# Patient Record
Sex: FEMALE | Race: WHITE | NOT HISPANIC OR LATINO | Employment: UNEMPLOYED | URBAN - METROPOLITAN AREA
[De-identification: names, ages, dates, MRNs, and addresses within clinical notes are randomized per-mention and may not be internally consistent; named-entity substitution may affect disease eponyms.]

---

## 2017-01-23 DIAGNOSIS — K21.9 GASTROESOPHAGEAL REFLUX DISEASE, ESOPHAGITIS PRESENCE NOT SPECIFIED: Primary | ICD-10-CM

## 2017-01-27 RX ORDER — LANSOPRAZOLE 30 MG/1
30 CAPSULE, DELAYED RELEASE ORAL DAILY
Qty: 30 CAPSULE | Refills: 3 | Status: SHIPPED | OUTPATIENT
Start: 2017-01-27 | End: 2017-08-29

## 2017-02-21 DIAGNOSIS — D68.62 LUPUS ANTICOAGULANT SYNDROME (H): ICD-10-CM

## 2017-02-21 RX ORDER — WARFARIN SODIUM 5 MG/1
5 TABLET ORAL DAILY
Qty: 60 TABLET | Refills: 0 | Status: SHIPPED | OUTPATIENT
Start: 2017-02-21 | End: 2017-06-06

## 2017-03-28 ENCOUNTER — OFFICE VISIT (OUTPATIENT)
Dept: FAMILY MEDICINE | Facility: CLINIC | Age: 32
End: 2017-03-28

## 2017-03-28 VITALS
WEIGHT: 142.4 LBS | HEIGHT: 62 IN | BODY MASS INDEX: 26.2 KG/M2 | HEART RATE: 98 BPM | OXYGEN SATURATION: 98 % | TEMPERATURE: 98.7 F | DIASTOLIC BLOOD PRESSURE: 87 MMHG | SYSTOLIC BLOOD PRESSURE: 126 MMHG

## 2017-03-28 DIAGNOSIS — D68.62 LUPUS ANTICOAGULANT DISORDER (H): Primary | ICD-10-CM

## 2017-03-28 DIAGNOSIS — F41.1 GAD (GENERALIZED ANXIETY DISORDER): ICD-10-CM

## 2017-03-28 LAB — INR PPP: 1.5

## 2017-03-28 RX ORDER — CLONAZEPAM 0.5 MG/1
0.5 TABLET ORAL 2 TIMES DAILY
Qty: 60 TABLET | Refills: 0 | Status: SHIPPED | OUTPATIENT
Start: 2017-03-28 | End: 2017-08-15

## 2017-03-28 ASSESSMENT — ANXIETY QUESTIONNAIRES
7. FEELING AFRAID AS IF SOMETHING AWFUL MIGHT HAPPEN: NOT AT ALL
5. BEING SO RESTLESS THAT IT IS HARD TO SIT STILL: NEARLY EVERY DAY
3. WORRYING TOO MUCH ABOUT DIFFERENT THINGS: NEARLY EVERY DAY
GAD7 TOTAL SCORE: 18
1. FEELING NERVOUS, ANXIOUS, OR ON EDGE: NEARLY EVERY DAY
6. BECOMING EASILY ANNOYED OR IRRITABLE: NEARLY EVERY DAY
IF YOU CHECKED OFF ANY PROBLEMS ON THIS QUESTIONNAIRE, HOW DIFFICULT HAVE THESE PROBLEMS MADE IT FOR YOU TO DO YOUR WORK, TAKE CARE OF THINGS AT HOME, OR GET ALONG WITH OTHER PEOPLE: SOMEWHAT DIFFICULT
2. NOT BEING ABLE TO STOP OR CONTROL WORRYING: NEARLY EVERY DAY

## 2017-03-28 ASSESSMENT — PATIENT HEALTH QUESTIONNAIRE - PHQ9: 5. POOR APPETITE OR OVEREATING: NEARLY EVERY DAY

## 2017-03-28 NOTE — PROGRESS NOTES
"  SUBJECTIVE:  Patient is a 31-year-old female with her sporadic follow up at the clinic presenting for issues with anxiety, requesting a psychiatry referral today.  Patient is a mother of a soon to be 2-year-old son.  She reports that she is at her wits end and reports that her partner whom she lives with and is the father of her sonGuille is not helpful, calls her names, drinks on a nightly basis, smokes marijuana.  She reports that he is not supportive as a co-parent and that he believes that it is her job to raise the children as he works every day and brings home financial support for the family.  The couple do live together.  However, she is the sole caregiver of their son, Guille per her report.  She reports that she has asked for help with son Guille in the past as recently as this week and she is told that this is her role and responsibility.  Patient reports that she has not slept more than 2 or 3 hours for the for the last 2 years.  She reports that her son, Guille continues to get up in the night on multiple occasions frequently transfers from  his room to her bed makes it very difficult to sleep.  She reports that she is extremely fatigued secondary to this.  She also reports that if given the chance to sleep she does not believe she would be able to.  She reports that she cannot \"turn off her brain\" and reports that she takes 2-3 hours to wind down and then when she is able to wind down her son wakes up.  She is requesting something to help with sleep.  She has tried the following in the past, hydroxyzine which was ineffective, Unisom which was ineffective and gabapentin, which made her feel \"crazy\" and that she would not live through the night.   She has also tried melatonin without help.  She is currently on Paxil daily for generalized depression as she does not feel that this helps with her sleep.  She denies illicit drug use other than occasional marijuana.  Denies smoking, denies alcohol dependence. " " The patient is requesting something to help her with her anxiety and sleep.     Patient also with a known history of lupus anticoagulant disorder.  She is on warfarin 5 mg a day.  Reports that she has not been taking it lately and over the past 2 days has reinitiated.  She is due for an INR today.       Patient Active Problem List   Diagnosis     Anxiety attack     Lupus anticoagulant syndrome (H)     Intractable chronic migraine without aura     Gastroesophageal reflux disease     Anxiety     Back pain     History of anticoagulant therapy     Chronic pain     Dysfunctional uterine bleeding     Generalized anxiety disorder     History of surgical procedure     History of obstetric problem     Lupus anticoagulant disorder (H)     Migraine     Adult body mass index greater than 30     History of thrombosis     Restless legs syndrome     Calculus of ureter     History of tubal ligation     Screening for malignant neoplasm of cervix     Cervical intraepithelial neoplasia grade III with severe dysplasia     Current smoker     Tobacco dependence syndrome     Pap smear for cervical cancer screening     Insomnia     Calculus of kidney     Current Outpatient Prescriptions   Medication     clonazePAM (KLONOPIN) 0.5 MG tablet     warfarin (COUMADIN) 5 MG tablet     PARoxetine (PAXIL) 40 MG tablet     LANsoprazole (PREVACID) 30 MG CR capsule     melatonin 3 MG tablet     ALPRAZolam (XANAX) 0.5 MG tablet     ondansetron (ZOFRAN) 4 MG tablet     oxyCODONE-acetaminophen (PERCOCET) 5-325 MG per tablet     No current facility-administered medications for this visit.        EXAM:   /87 (BP Location: Right arm, Patient Position: Chair, Cuff Size: Adult Regular)  Pulse 98  Temp 98.7  F (37.1  C) (Oral)  Ht 5' 1.75\" (156.8 cm)  Wt 142 lb 6.4 oz (64.6 kg)  LMP 03/21/2017  SpO2 98%  BMI 26.26 kg/m2    This is an anxious appearing 31-year-old female with son present.  Son is very active in the room.  Patient is coherent.  She " is able to follow logical thought.  She is able to communicate effectively.  She is able to give me a chronologic history, she does not appear to be under the influence of any substances.  She does seem to be in moderate distress from stress.   HEENT:  Head is atraumatic, normocephalic.   CARDIOVASCULAR:  Regular rate and rhythm without murmurs, rubs or gallops.   LUNGS:  Clear to auscultation bilaterally without wheezes, rhonchi or rales.   SKIN:  Warm and dry without bruises, rashes or other lesions.   PSYCHIATRIC:  As above.     MSK:  Ambulating without difficulty.   Interaction with her son is normal.     ASSESSMENT AND PLAN:  This is a 31-year-old female with a history of generalized anxiety disorder,  social disruption and depression.  She also has a known history of lupus anticoagulant disorder on warfarin but has had intermittent follow up with INRs.  Problems are as follows;   1.  Sleep interruption.  I discussed extensively with the patient the importance of sleep and the importance of getting both her and her son to sleep.  I discussed that no medication overall is going to help and that I do not feel that a stronger sleep medicine such as a Z drug or other would be beneficial as she still needs to be able to respond to son and that the true  i with son's sleep hygiene and I gave two handouts on sleep training for her and encouraged her to talk to her partner about sleep training their son Guille, discussed with patient that we need Guille to be sleeping through the night, which he should be at 2 years of age in order for her to get adequate sleep and to treat underlying anxiety and exhaustion.  I did discuss with patient some resources for respite and encouraged her to think of family or friends who can give her some respite support.  She is not currently open to these suggestions today.   2.  Anxiety.  Patient is requesting a psychiatry referral.  I agreed with this; however, the patient does have some  social disruption as well as sleep issues which are compounding issues.  I discussed with the patient today and I have agreed to a low dose of scheduled Klonopin to help with daytime anxiety and we will not use a sleep agent today.  The patient should continue her Paxil and a psychiatry referral has been placed.  The patient is amenable to this.  The patient does report occasional THC use and I reported to her that for a refill of Klonopin she would need to have a clean urine drug screen prior to refill.  She verbalizes understanding of this.   3.  Lupus anticoagulant disorders.  INR is sub-therapeutic today.  Patient has not been taking medications regularly.  She will continue with 5 mg daily of warfarin and have this rechecked in 1-2 weeks when she follows up to discuss sleep training as well as anxiety.   4.  Social disruption.  It sounds as though patient does not have a significant amount of social support and we will need to continue to talk about her level of comfort with her current living situation and if she feels safe in her current environment.  She does not express any concerns about harm by her partner today.  She did say that he occasionally calls her names, but she does not feel threatened.  Patient will follow up with me in 1-2 weeks.     Greater than 25 min spent in direct consultation, evaluation and education with patient    Options for treatment and follow-up care were reviewed with the patient and/or guardian. Lisandra Arauz and/or guardian engaged in the decision making process and verbalized understanding of the options discussed and agreed with the final plan.    Flaquita Starr MD

## 2017-03-28 NOTE — MR AVS SNAPSHOT
After Visit Summary   3/28/2017    Lisandra Arauz    MRN: [id number]           Patient Information     Date Of Birth          1985        Visit Information        Provider Department      3/28/2017 2:00 PM Flaquita Starr MD [facility]        Today's Diagnoses     Lupus anticoagulant disorder (H)    -  1    ARGENTINA (generalized anxiety disorder)           Follow-ups after your visit        Additional Services     Mental Health Referral - [facility]       Use this form for behavioral health consults and assessments. The referral coordinator will help to determine whether patients are best served by clinic behavioral health staff or by community providers.    Type of referral(s) requested (indicate all that apply):  Adult Psychiatry--for diagnosis and medication management    Reason for referral:  Depression with anxiety.  insomnia    Currently receiving mental health services (if 'Yes', what services and why today's referral?): No  Currently having suicidal thoughts: No  Previous psych hospitalization: Unknown       needed: No  Language: English                  Your next 10 appointments already scheduled     Apr 11, 2017  2:40 PM CDT   Return Visit with Flaquita Starr MD   [facility] ([facility])    [address] [place] [address]   [phone]              Who to contact     Please call your clinic at [phone] to:    Ask questions about your health    Make or cancel appointments    Discuss your medicines    Learn about your test results    Speak to your doctor   If you have compliments or concerns about an experience at your clinic, or if you wish to file a complaint, please contact [facility] Patient Relations at [phone] or email us at [email or web address]         Additional Information About Your Visit        Care EveryWhere ID     This is your Care EveryWhere ID. This could be  "used by other organizations to access your [place] medical records  [id number]        Your Vitals Were     Pulse Temperature Height Last Period Pulse Oximetry BMI (Body Mass Index)    98 98.7  F (37.1  C) (Oral) 5' 1.75\" (156.8 cm) 03/21/2017 98% 26.26 kg/m2       Blood Pressure from Last 3 Encounters:   03/28/17 126/87   12/13/16 133/87   11/04/16 136/89    Weight from Last 3 Encounters:   03/28/17 142 lb 6.4 oz (64.6 kg)   12/13/16 135 lb 9.6 oz (61.5 kg)   11/04/16 142 lb 3.2 oz (64.5 kg)              We Performed the Following     INR ([facility])     Mental Health Referral - [facility]          Today's Medication Changes          These changes are accurate as of: 3/28/17 11:59 PM.  If you have any questions, ask your nurse or doctor.               Start taking these medicines.        Dose/Directions    clonazePAM 0.5 MG tablet   Commonly known as:  klonoPIN   Used for:  ARGENTINA (generalized anxiety disorder)   Started by:  Flaquita Starr MD        Dose:  0.5 mg   Take 1 tablet (0.5 mg) by mouth 2 times daily   Quantity:  60 tablet   Refills:  0            Where to get your medicines      Some of these will need a paper prescription and others can be bought over the counter.  Ask your nurse if you have questions.     Bring a paper prescription for each of these medications     clonazePAM 0.5 MG tablet                Primary Care Provider Office Phone # Fax #    Flaquita Starr [phone]       [facility] [address] [place] [address]        Thank you!     Thank you for choosing [facility]  for your care. Our goal is always to provide you with excellent care. Hearing back from our patients is one way we can continue to improve our services. Please take a few minutes to complete the written survey that you may receive in the mail after your visit with us. Thank you!             Your Updated Medication List - Protect others around you: Learn how to " safely use, store and throw away your medicines at www.disposemymeds.org.          This list is accurate as of: 3/28/17 11:59 PM.  Always use your most recent med list.                   Brand Name Dispense Instructions for use    ALPRAZolam 0.5 MG tablet    XANAX    7 tablet    Take 1 tablet (0.5 mg) by mouth nightly as needed for sleep or anxiety       clonazePAM 0.5 MG tablet    klonoPIN    60 tablet    Take 1 tablet (0.5 mg) by mouth 2 times daily       LANsoprazole 30 MG CR capsule    PREVACID    30 capsule    Take 1 capsule (30 mg) by mouth daily Take 30-60 minutes before a meal.       melatonin 3 MG tablet     30 tablet    Take 1 tablet (3 mg) by mouth nightly as needed for sleep       PARoxetine 40 MG tablet    PAXIL    30 tablet    Take 1 tablet (40 mg) by mouth At Bedtime       PERCOCET 5-325 MG per tablet   Generic drug:  oxyCODONE-acetaminophen      Take 1-2 tablets by mouth Reported on 3/28/2017       warfarin 5 MG tablet    COUMADIN    60 tablet    Take 1 tablet (5 mg) by mouth daily       ZOFRAN 4 MG tablet   Generic drug:  ondansetron      Reported on 3/28/2017

## 2017-03-31 ENCOUNTER — DOCUMENTATION ONLY (OUTPATIENT)
Dept: FAMILY MEDICINE | Facility: CLINIC | Age: 32
End: 2017-03-31

## 2017-03-31 NOTE — PROGRESS NOTES
Referral for (Test): Psychiatry/Psychology   Location/Place/Provider:    Date/Time:    Phone:   Fax:   Additional information/prep.: I called to help the pt set up this appointment, I called the pt on 03/31/17, 04/03/17, and today, the pt phone number was not taking any incoming calls right now.  I will print out the referral, attach my contact information, and mail it to the pt.    Scheduled by: NERI Merino

## 2017-03-31 NOTE — PROGRESS NOTES
Procedure Requested        9035     Mental Health Referral - [facility] [#[id number]]         Priority: Routine  Class: External referral         Comment:Use this form for behavioral health consults and assessments. The                  referral coordinator will help to determine whether patients are                  best served by clinic behavioral health staff or by community                  providers.                                    Type of referral(s) requested (indicate all that apply):                  Adult Psychiatry--for diagnosis and medication management                                    Reason for referral:  Depression with anxiety.  insomnia                                    Currently receiving mental health services (if 'Yes', what                   services and why today's referral?): No                  Currently having suicidal thoughts: No                  Previous psych hospitalization: Unknown                                                       needed: No                  Language: English       Associated Diagnoses         F41.1 ARGENTINA (generalized anxiety disorder)               KEISHA LOVE             [id number]               : 1985  F      [address]                                     PCP: [id number], KATHRYN M* [place] [address]                                CTR: [facility]

## 2017-04-13 ASSESSMENT — ANXIETY QUESTIONNAIRES: GAD7 TOTAL SCORE: 18

## 2017-04-13 ASSESSMENT — PATIENT HEALTH QUESTIONNAIRE - PHQ9: SUM OF ALL RESPONSES TO PHQ QUESTIONS 1-9: 8

## 2017-05-01 ENCOUNTER — TELEPHONE (OUTPATIENT)
Dept: FAMILY MEDICINE | Facility: CLINIC | Age: 32
End: 2017-05-01

## 2017-05-01 NOTE — TELEPHONE ENCOUNTER
Attempted to reach patient to follow up on recent ED visit for flank pain. Number is not accepting calls at this time.

## 2017-06-06 DIAGNOSIS — D68.62 LUPUS ANTICOAGULANT SYNDROME (H): ICD-10-CM

## 2017-06-10 RX ORDER — WARFARIN SODIUM 5 MG/1
5 TABLET ORAL DAILY
Qty: 60 TABLET | Refills: 0 | Status: SHIPPED | OUTPATIENT
Start: 2017-06-10 | End: 2017-08-04

## 2017-06-12 ENCOUNTER — TELEPHONE (OUTPATIENT)
Dept: FAMILY MEDICINE | Facility: CLINIC | Age: 32
End: 2017-06-12

## 2017-06-12 NOTE — TELEPHONE ENCOUNTER
[facility] phone call message- general phone call:    Reason for call: Patient called to schedule an appt with Dr. Starr regarding her INR but she states her insurance is not active at the moment so I told her that I am not able to schedule until her insurance is active. Patient states it will be active in 2 weeks and she is still waiting to hear back from her  so she wants to put in a message for Dr. Starr letting her know that she did try to make an appt but no active insurance.     Return call needed: No    OK to leave a message on voice mail?     Primary language: English      needed? No    Call taken on June 12, 2017 at 9:21 AM by Vj Merchant

## 2017-07-09 ENCOUNTER — TRANSFERRED RECORDS (OUTPATIENT)
Dept: HEALTH INFORMATION MANAGEMENT | Facility: CLINIC | Age: 32
End: 2017-07-09

## 2017-07-13 ENCOUNTER — TELEPHONE (OUTPATIENT)
Dept: FAMILY MEDICINE | Facility: CLINIC | Age: 32
End: 2017-07-13

## 2017-07-13 NOTE — TELEPHONE ENCOUNTER
Recent insurance issues. Please call to see if eligible for appointment scheduling w/ Dr. Starr or lab for INR.

## 2017-07-14 NOTE — TELEPHONE ENCOUNTER
Ed Follow up and insurance questions      Ran insurance and pt is still not active  Called pt and had to leave message to follow up on recent visit to ED at [facility]  Had to leave message also in message asked about her follow up with county regarding insurance asked her to give a call back    Milo

## 2017-07-17 ENCOUNTER — TELEPHONE (OUTPATIENT)
Dept: FAMILY MEDICINE | Facility: CLINIC | Age: 32
End: 2017-07-17

## 2017-07-17 NOTE — TELEPHONE ENCOUNTER
ED follow up    Pt was available when I called and said some of her right sided pain has eased and lessened but she is now uncomfortable due to a yeast infection and cyst on ovary    Asked if she wanted to come in and see a dr but she is still without insurance and unable to cover cost of a visit- told her of aid at [facility] in doing county paperwork and determining eligibility on site or offered our help  She is in process of getting established    She has had a recent move to [facility] as well so address is not correct      Told her of our 24 hr line and encouraged her to seek medical attention if things worsen with her infection or comfort  Lbetz

## 2017-07-26 DIAGNOSIS — F41.1 GENERALIZED ANXIETY DISORDER: ICD-10-CM

## 2017-07-28 RX ORDER — PAROXETINE 40 MG/1
40 TABLET, FILM COATED ORAL AT BEDTIME
Qty: 30 TABLET | Refills: 1 | Status: SHIPPED | OUTPATIENT
Start: 2017-07-28 | End: 2017-10-02 | Stop reason: ALTCHOICE

## 2017-08-08 ENCOUNTER — OFFICE VISIT (OUTPATIENT)
Dept: PSYCHOLOGY | Facility: CLINIC | Age: 32
End: 2017-08-08

## 2017-08-08 DIAGNOSIS — F41.1 GENERALIZED ANXIETY DISORDER: Primary | ICD-10-CM

## 2017-08-08 DIAGNOSIS — F43.10 PTSD (POST-TRAUMATIC STRESS DISORDER): ICD-10-CM

## 2017-08-08 NOTE — MR AVS SNAPSHOT
After Visit Summary   8/8/2017    Lisandra Arauz    MRN: [id number]           Patient Information     Date Of Birth          1985        Visit Information        Provider Department      8/8/2017 2:00 PM Mikayla Peterson, LMFT [facility]        Today's Diagnoses     Generalized anxiety disorder    -  1    PTSD (post-traumatic stress disorder)           Follow-ups after your visit        Additional Services     Mental Health Referral - [facility]       Use this form for behavioral health consults and assessments. The referral coordinator will help to determine whether patients are best served by clinic behavioral health staff or by community providers.    Type of referral(s) requested (indicate all that apply):  Adult Psychiatry--for diagnosis and medication management    Reason for referral: Please schedule with Dr. Carter for psych referral. Pt has hx of anxiety disorder (likely ARGENTINA with some panic, possibly panic d/o) and PTSD. PTSD symptoms have worsened (hypervigilance, flashbacks, not feeling safe/settled) in addition to worsening anxiety. Pt sleeps approx 3-4 hrs/night. Wants improved sleep and decreased feelings of anxiety. Has been on Paxil for a number of years, believes she is maxed out. Wondering about other medication options to help. Reports other sleep aids have been ineffective. Began psychotherapy with me; has done psychotherapy in the past. Well aware of a number of appropriate coping mechanisms; seem to help less now.     Currently receiving mental health services (if 'Yes', what services and why today's referral?): Yes: initiated tx at [facility] with Dr. Peterson  Currently having suicidal thoughts: No  Previous psych hospitalization: No    Please provide data for below screening tools if available.   PHQ-9 Score: 8 (3/23/17)  GAD7 Score: 18 (3/23/17)  PC-PTSD Score: 4 (8/8/17)  Bipolar Screen:  Kathleen (ADHD):   MCHAT (Autism Screen):   Pediatric Symptom Checklist  (PSC):      needed: No  Language: English                  Your next 10 appointments already scheduled     Aug 15, 2017  9:40 AM CDT   Return Visit with Flaquita Starr MD   [facility] ([facility])    [address] [place] [address]   [phone]            Aug 16, 2017 10:40 AM CDT   Return Visit with JANICE Wolfe   [facility] ([facility])    [address] [place] [address]   [phone]              Who to contact     Please call your clinic at [phone] to:    Ask questions about your health    Make or cancel appointments    Discuss your medicines    Learn about your test results    Speak to your doctor   If you have compliments or concerns about an experience at your clinic, or if you wish to file a complaint, please contact [facility] Patient Relations at [phone] or email us at [email or web address]         Additional Information About Your Visit        MyLuvsharArcMail Information     Jumio is an electronic gateway that provides easy, online access to your medical records. With Jumio, you can request a clinic appointment, read your test results, renew a prescription or communicate with your care team.     To sign up for Jumio visit the website at [email or web address]   You will be asked to enter the access code listed below, as well as some personal information. Please follow the directions to create your username and password.     Your access code is: [email or web address]  Expires: 2017  9:23 AM     Your access code will  in 90 days. If you need help or a new code, please contact your [facility] or call [phone] for assistance.        Care EveryWhere ID     This is your Care EveryWhere ID. This could be used by other organizations to access your [place] medical records  [id number]         Blood Pressure from Last 3 Encounters:    03/28/17 126/87   12/13/16 133/87   11/04/16 136/89    Weight from Last 3 Encounters:   03/28/17 142 lb 6.4 oz (64.6 kg)   12/13/16 135 lb 9.6 oz (61.5 kg)   11/04/16 142 lb 3.2 oz (64.5 kg)              We Performed the Following     Mental Health Referral - [facility]     Psychiatric Diagnostic Eval ([address])        Primary Care Provider Office Phone # Fax #    Flaquita Starr [phone]       [facility] [address] [facility] [address]        Equal Access to Services     [facility] MAHENDRA : Hadii aad ku hadasho Soomaali, waaxda luqadaha, qaybta kaalmada adeegyada, waxay idiin hayaan eugenia brown . So [facility] [phone].    ATENCIÓN: Si habla español, tiene a rivas disposición servicios gratuitos de asistencia lingüística. Llame al [phone].    We comply with applicable federal civil rights laws and [place] laws. We do not discriminate on the basis of race, color, national origin, age, disability sex, sexual orientation or gender identity.            Thank you!     Thank you for choosing [facility]  for your care. Our goal is always to provide you with excellent care. Hearing back from our patients is one way we can continue to improve our services. Please take a few minutes to complete the written survey that you may receive in the mail after your visit with us. Thank you!             Your Updated Medication List - Protect others around you: Learn how to safely use, store and throw away your medicines at www.disposemymeds.org.          This list is accurate as of: 8/8/17 11:59 PM.  Always use your most recent med list.                   Brand Name Dispense Instructions for use Diagnosis    ALPRAZolam 0.5 MG tablet    XANAX    7 tablet    Take 1 tablet (0.5 mg) by mouth nightly as needed for sleep or anxiety    Anxiety attack       clonazePAM 0.5 MG tablet    klonoPIN    60 tablet    Take 1 tablet (0.5 mg) by mouth 2 times daily    ARGENTINA (generalized  anxiety disorder)       LANsoprazole 30 MG CR capsule    PREVACID    30 capsule    Take 1 capsule (30 mg) by mouth daily Take 30-60 minutes before a meal.    Gastroesophageal reflux disease, esophagitis presence not specified       melatonin 3 MG tablet     30 tablet    Take 1 tablet (3 mg) by mouth nightly as needed for sleep    Psychophysiological insomnia       PARoxetine 40 MG tablet    PAXIL    30 tablet    Take 1 tablet (40 mg) by mouth At Bedtime    Generalized anxiety disorder       PERCOCET 5-325 MG per tablet   Generic drug:  oxyCODONE-acetaminophen      Take 1-2 tablets by mouth Reported on 3/28/2017        warfarin 5 MG tablet    COUMADIN    30 tablet    Take 1 tablet (5 mg) by mouth daily    Lupus anticoagulant syndrome (H)       ZOFRAN 4 MG tablet   Generic drug:  ondansetron      Reported on 3/28/2017
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
## 2017-08-09 ENCOUNTER — DOCUMENTATION ONLY (OUTPATIENT)
Dept: FAMILY MEDICINE | Facility: CLINIC | Age: 32
End: 2017-08-09

## 2017-08-09 PROBLEM — F43.10 PTSD (POST-TRAUMATIC STRESS DISORDER): Status: ACTIVE | Noted: 2017-08-09

## 2017-08-09 NOTE — PROGRESS NOTES
Referral for (Test): Psychiatry   Location/Place/Provider: [facility]    Date/Time: 08/21/17 at 8:30am ()   Phone: [phone]  Fax: [phone]  Additional information/prep.:   Scheduled by: NERI Merino

## 2017-08-09 NOTE — PROGRESS NOTES
Procedure Requested        9035     Mental Health Referral - [facility] [#[id number]]         Priority: Routine  Class: External referral         Comment:Use this form for behavioral health consults and assessments. The                  referral coordinator will help to determine whether patients are                  best served by clinic behavioral health staff or by community                  providers.                                    Type of referral(s) requested (indicate all that apply):                  Adult Psychiatry--for diagnosis and medication management                                    Reason for referral: Please schedule with Dr. Carter for psych                   referral. Pt has hx of anxiety disorder (likely [place] with some                   panic, possibly panic d/o) and PTSD. PTSD symptoms have worsened                   (hypervigilance, flashbacks, not feeling safe/settled) in addition                   to worsening anxiety. Pt sleeps approx 3-4 hrs/night. Wants                   improved sleep and decreased feelings of anxiety. Has been on                   Paxil for a number of years, believes she is maxed out. Wondering                   about other medication options to help. Reports other sleep aids                   have been ineffective. Began psychotherapy with me; has done                   psychotherapy in the past. Well aware of a number of appropriate                   coping mechanisms; seem to help less now.                                     Currently receiving mental health services (if 'Yes', what                   services and why today's referral?): Yes: initiated tx at [facility] with                   Dr. Peterson                  Currently having suicidal thoughts: No                  Previous psych hospitalization: No                                    Please provide data for below screening tools if available.                   PHQ-9 Score: 8 (3/23/17)                  GAD7  Score: 18 (3/23/17)                  PC-PTSD Score: 4 (17)                  Bipolar Screen:                  [place] (ADHD):                   MCHAT (Autism Screen):                   Pediatric Symptom Checklist (PSC):                                      needed: No                  Language: English       Associated Diagnoses         F41.1 Generalized anxiety disorder         F43.10 PTSD (post-traumatic stress disorder)               KEISHA LOVE             [id number]               : 1985  F      [address]                                      PCP: [id number] [id number]     [place] [address]                                CTR: [facility]

## 2017-08-15 ENCOUNTER — OFFICE VISIT (OUTPATIENT)
Dept: FAMILY MEDICINE | Facility: CLINIC | Age: 32
End: 2017-08-15

## 2017-08-15 ENCOUNTER — RECORDS - HEALTHEAST (OUTPATIENT)
Dept: ADMINISTRATIVE | Facility: OTHER | Age: 32
End: 2017-08-15

## 2017-08-15 VITALS
OXYGEN SATURATION: 99 % | HEART RATE: 96 BPM | DIASTOLIC BLOOD PRESSURE: 73 MMHG | SYSTOLIC BLOOD PRESSURE: 117 MMHG | HEIGHT: 62 IN | RESPIRATION RATE: 18 BRPM | BODY MASS INDEX: 26.31 KG/M2 | WEIGHT: 143 LBS | TEMPERATURE: 98.7 F

## 2017-08-15 DIAGNOSIS — F41.1 GAD (GENERALIZED ANXIETY DISORDER): ICD-10-CM

## 2017-08-15 DIAGNOSIS — F41.0 ANXIETY ATTACK: ICD-10-CM

## 2017-08-15 DIAGNOSIS — M32.8 OTHER FORMS OF SYSTEMIC LUPUS ERYTHEMATOSUS, UNSPECIFIED ORGAN INVOLVEMENT STATUS (H): Primary | ICD-10-CM

## 2017-08-15 RX ORDER — PREDNISONE 10 MG/1
10 TABLET ORAL DAILY
Qty: 14 TABLET | Refills: 0 | Status: SHIPPED | OUTPATIENT
Start: 2017-08-15 | End: 2017-08-29

## 2017-08-15 RX ORDER — CLONAZEPAM 0.5 MG/1
0.5 TABLET ORAL 2 TIMES DAILY
Qty: 60 TABLET | Refills: 0 | Status: SHIPPED | OUTPATIENT
Start: 2017-08-15 | End: 2017-08-29

## 2017-08-15 RX ORDER — ALPRAZOLAM 0.5 MG
0.5 TABLET ORAL
Qty: 7 TABLET | Refills: 0 | Status: CANCELLED | OUTPATIENT
Start: 2017-08-15

## 2017-08-15 ASSESSMENT — ANXIETY QUESTIONNAIRES
GAD7 TOTAL SCORE: 21
IF YOU CHECKED OFF ANY PROBLEMS ON THIS QUESTIONNAIRE, HOW DIFFICULT HAVE THESE PROBLEMS MADE IT FOR YOU TO DO YOUR WORK, TAKE CARE OF THINGS AT HOME, OR GET ALONG WITH OTHER PEOPLE: VERY DIFFICULT
3. WORRYING TOO MUCH ABOUT DIFFERENT THINGS: NEARLY EVERY DAY
5. BEING SO RESTLESS THAT IT IS HARD TO SIT STILL: NEARLY EVERY DAY
6. BECOMING EASILY ANNOYED OR IRRITABLE: NEARLY EVERY DAY
1. FEELING NERVOUS, ANXIOUS, OR ON EDGE: NEARLY EVERY DAY
7. FEELING AFRAID AS IF SOMETHING AWFUL MIGHT HAPPEN: NEARLY EVERY DAY
2. NOT BEING ABLE TO STOP OR CONTROL WORRYING: NEARLY EVERY DAY

## 2017-08-15 ASSESSMENT — PATIENT HEALTH QUESTIONNAIRE - PHQ9
5. POOR APPETITE OR OVEREATING: NEARLY EVERY DAY
SUM OF ALL RESPONSES TO PHQ QUESTIONS 1-9: 6

## 2017-08-15 NOTE — PATIENT INSTRUCTIONS
-continue Melatonin at bedtime 5mg   -we will start prednisone 10mg daily for your lupus pain  -we are restarting your Klonopin for now, this will be re-evaluated with your psychiatrist visit here at [facility]  -I am referring your to rheumatology for help with the pains from your lupus      Referral for ( TEST )  :      Rheumatology  LOCATION/PLACE/Provider :    [facility]   DATE & TIME :     10- at 8:00am  PHONE :     [phone]  FAX :     [phone]  ADDITIONAL INFORMATION :     NA  Appointment made by clinic staff/:    [facility]ane

## 2017-08-15 NOTE — MR AVS SNAPSHOT
After Visit Summary   8/15/2017    Lisandra Arauz    MRN: [id number]           Patient Information     Date Of Birth          1985        Visit Information        Provider Department      8/15/2017 9:40 AM Flaquita Starr MD [facility]        Today's Diagnoses     Other forms of systemic lupus erythematosus, unspecified organ involvement status (H)    -  1    Anxiety attack        [place] (generalized anxiety disorder)          Care Instructions    -continue Melatonin at bedtime 5mg   -we will start prednisone 10mg daily for your lupus pain  -we are restarting your Klonopin for now, this will be re-evaluated with your psychiatrist visit here at [facility]  -I am referring your to rheumatology for help with the pains from your lupus      Referral for ( TEST )  :      Rheumatology  LOCATION/PLACE/Provider :    [facility]   DATE & TIME :     10- at 8:00am  PHONE :     [phone]  FAX :     [phone]  ADDITIONAL INFORMATION :     NA  Appointment made by clinic staff/:    Zane            Follow-ups after your visit        Additional Services     RHEUMATOLOGY REFERRAL       Patient is in room number: 13    Reason for Referral: Lupus     needed: No  Language: English    May leave message on voicemail: Yes    ([facility] Only) Referral should be tracked Yes                  Your next 10 appointments already scheduled     Sep 06, 2017 10:00 AM CDT   Return Visit with Flaquita Starr MD   [facility] (Critical access hospital)    [address] [place] [address] [id number] [phone]            Oct 02, 2017 10:00 AM CDT   CONSULT with Donaldo Carter MD   [facility] (Critical access hospital)    [address] [place] [address] [id number] [phone]              Who to contact     Please call your clinic at [phone] to:    Ask questions about your health    Make or cancel appointments    Discuss your medicines    Learn about your test  "results    Speak to your doctor   If you have compliments or concerns about an experience at your clinic, or if you wish to file a complaint, please contact [facility] Patient Relations at [phone] or email us at [email or web address]         Additional Information About Your Visit        [employer] Information     [employer] is an electronic gateway that provides easy, online access to your medical records. With [employer], you can request a clinic appointment, read your test results, renew a prescription or communicate with your care team.     To sign up for [employer] visit the website at [email or web address]   You will be asked to enter the access code listed below, as well as some personal information. Please follow the directions to create your username and password.     Your access code is: [email or web address]  Expires: 2017  9:23 AM     Your access code will  in 90 days. If you need help or a new code, please contact your [facility] or call [phone] for assistance.        Care EveryWhere ID     This is your Care EveryWhere ID. This could be used by other organizations to access your [place] medical records  [id number]        Your Vitals Were     Pulse Temperature Respirations Height Pulse Oximetry BMI (Body Mass Index)    96 98.7  F (37.1  C) 18 5' 1.75\" (156.8 cm) 99% 26.37 kg/m2       Blood Pressure from Last 3 Encounters:   08/15/17 117/73   17 126/87   16 133/87    Weight from Last 3 Encounters:   08/15/17 143 lb (64.9 kg)   17 142 lb 6.4 oz (64.6 kg)   16 135 lb 9.6 oz (61.5 kg)              We Performed the Following     RHEUMATOLOGY REFERRAL          Today's Medication Changes          These changes are accurate as of: 8/15/17 11:59 PM.  If you have any questions, ask your nurse or doctor.               Start taking these medicines.        Dose/Directions    predniSONE 10 MG tablet   Commonly known " as:  DELTASONE   Used for:  Other forms of systemic lupus erythematosus, unspecified organ involvement status (H)   Started by:  Flaquita Starr MD        Dose:  10 mg   Take 1 tablet (10 mg) by mouth daily   Quantity:  14 tablet   Refills:  0         Stop taking these medicines if you haven't already. Please contact your care team if you have questions.     ALPRAZolam 0.5 MG tablet   Commonly known as:  XANAX   Stopped by:  Flaquita Starr MD           PERCOCET 5-325 MG per tablet   Generic drug:  oxyCODONE-acetaminophen   Stopped by:  Flaquita Starr MD           ZOFRAN 4 MG tablet   Generic drug:  ondansetron   Stopped by:  Flaquita Starr MD                Where to get your medicines      These medications were sent to [facility]pharm[facility] #[id number] - [place], [place] - [address] [facility] [id number] [facility] [address] [address]     Phone:  [phone]     predniSONE 10 MG tablet         Some of these will need a paper prescription and others can be bought over the counter.  Ask your nurse if you have questions.     Bring a paper prescription for each of these medications     clonazePAM 0.5 MG tablet                Primary Care Provider Office Phone # Fax #    Flaquita Starr [phone]       [facility] [address] [place] [address]        Equal Access to Services     [facility] AH: Hadii aad ku hadasho Soomaali, waaxda luqadaha, qaybta kaalmada adeegyada, waxagustina whitmore. So [facility] [phone].    ATENCIÓN: Si habla español, tiene a rivas disposición servicios gratuitos de asistencia lingüística. Lltone al [phone].    We comply with applicable federal civil rights laws and [place] laws. We do not discriminate on the basis of race, color, national origin, age, disability sex, sexual orientation or gender identity.            Thank you!     Thank you for choosing [facility]   for your care. Our goal is always to provide you with excellent care. Hearing back from our patients is one way we can continue to improve our services. Please take a few minutes to complete the written survey that you may receive in the mail after your visit with us. Thank you!             Your Updated Medication List - Protect others around you: Learn how to safely use, store and throw away your medicines at www.disposemymeds.org.          This list is accurate as of: 8/15/17 11:59 PM.  Always use your most recent med list.                   Brand Name Dispense Instructions for use Diagnosis    clonazePAM 0.5 MG tablet    klonoPIN    60 tablet    Take 1 tablet (0.5 mg) by mouth 2 times daily    [place] (generalized anxiety disorder)       LANsoprazole 30 MG CR capsule    PREVACID    30 capsule    Take 1 capsule (30 mg) by mouth daily Take 30-60 minutes before a meal.    Gastroesophageal reflux disease, esophagitis presence not specified       melatonin 3 MG tablet     30 tablet    Take 1 tablet (3 mg) by mouth nightly as needed for sleep    Psychophysiological insomnia       PARoxetine 40 MG tablet    PAXIL    30 tablet    Take 1 tablet (40 mg) by mouth At Bedtime    Generalized anxiety disorder       predniSONE 10 MG tablet    DELTASONE    14 tablet    Take 1 tablet (10 mg) by mouth daily    Other forms of systemic lupus erythematosus, unspecified organ involvement status (H)       warfarin 5 MG tablet    COUMADIN    30 tablet    Take 1 tablet (5 mg) by mouth daily    Lupus anticoagulant syndrome (H)

## 2017-08-15 NOTE — PROGRESS NOTES
"HPI    Lisandra Arauz is 32 year old yo female with PMH significant for SLE presenting for:    Chronic pain associated with Lupus:  -pain in body getting worse, throbbing stabbing pain in knuckles, hands, all upper and lower extremity joints  -diffuse myalgias  -visceral pain that patient localizes to right kidney  -will have intermittent \"good days\" with reduced pain , will be extra active and following these days will feel diffusely sore for several days afterwards  -reduced appetite recently that she attributes to pain  --limits activity, difficulty opening pill bottles, difficulty working  -takes APAP for pain, avoids NSAIDs because of interaction with warfarin  --largely ineffective, but provides some mild relief of pain  -last saw rheumatologist approximately 3 years ago, received glucocorticoid injection, no trial of DMARD    Difficulty sleeping  --Insomnia, trouble falling asleep at night due to pain, son awakes 2-3 times per night and awakes patient  ---90 minute sleep latency  ---usually take melatonin 5mg at 7:30-8:00pm, bedtime at 9:30pm    Anxiety  -constant baseline anxiety, no palpitations, but heart feels like it is beating faster/harder than normal  -anxiety attacks occur unprovoked throughout the day, up to 5x/day  -no insurance since April, had to pay out of pocket for paxil and coumadin. Only taking those and tylenol.    Cash assistance with county  -Patient moved out from previous residence with significant other  -requires paperwork to be completed by physician      OBJECTIVE    Vitals  /73  Pulse 96  Temp 98.7  F (37.1  C)  Resp 18  Ht 5' 1.75\" (156.8 cm)  Wt 143 lb (64.9 kg)  SpO2 99%  BMI 26.37 kg/m2      Physical Exam  General: No acute distress  CV: RRR, no murmurs, rubs, or gallops  Respiratory: CTA bilaterally, no wheezes/rhonchi/rhales appreciated  Psych: anxious, but appropriate.      Labs  Office Visit on 03/28/2017   Component Date Value Ref Range Status     INR " 03/28/2017 1.5   Final    Comment: Adult Normal Range: 0.90 - 1.10  Therapeutic Range: 2.0 - 3.5         Imaging    ASSESSMENT/PLAN  #Chronic Myalgias/Arthralgias  #Lupus  -start 10mg prednisone PO QD  -follow-up in 2 weeks  -refer to rheumatology    #Anxiety  #Sleep Disturbances  #Sleep Latency:  -continue talk therapy  -continue sleep training   -continue melatonin 5mg PH QHS  -restart klonopin 0.5mg PO BID      #Financial Assistance Forms  -will need forms for completion, but can assist.    2. Other forms of systemic lupus erythematosus, unspecified organ involvement status (H)  - predniSONE (DELTASONE) 10 MG tablet; Take 1 tablet (10 mg) by mouth daily  Dispense: 14 tablet; Refill: 0  - RHEUMATOLOGY REFERRAL  -patient on warfarin for this indication.  Difficulty with monitoring due to insurance issues.  Goal 2-3.      3. ARGENTINA (generalized anxiety disorder  - clonazePAM (KLONOPIN) 0.5 MG tablet; Take 1 tablet (0.5 mg) by mouth 2 times daily  Dispense: 60 tablet; Refill: 0        Signed,  Gennaro Simental, MS3 acting as scribe to Dr. Starr      The medical student acted as a scribe and the encounter documented above was performed completely by me and the documentation accurately reflects the work I have performed today.      Flaquita Starr MD

## 2017-08-16 ASSESSMENT — ANXIETY QUESTIONNAIRES: GAD7 TOTAL SCORE: 21

## 2017-08-23 ENCOUNTER — TELEPHONE (OUTPATIENT)
Dept: FAMILY MEDICINE | Facility: CLINIC | Age: 32
End: 2017-08-23

## 2017-08-23 NOTE — TELEPHONE ENCOUNTER
[facility] phone call message- medication clarification/question:    Full Medication Name: Clonazepam   Strength: 0.5 mg    Have you contacted your pharmacy about this refill request?     If  Yes,  which pharmacy?    When did you contact the pharmacy?    Additional comments/concerns from call to pharmacy:    Reason for call to clinic:  Called Patient to reschedule her appt with Raul Fulton in October and she had these two messages that she would like to send to the doctor.     Patient states she would like a message for Dr. Starr regarding medication above, she states it is not working, she has not been able to sleep and still having anxiety. She states she tried taking 2 pill at a time too to see if it would help but it didn't either. She states her Mother takes these two medications, xanax and ativan. She states she has taken xanax and it helps her and took ativan before for plane rides and it helps for her anxiety. She is wondering if the doctor could switch the medication for her. She states she has not been taking the medication anymore. She has return the medications to the [facility] station where they have a medication box for return or don't use.     Also she is asking about a medical form that she had dropped off  before her appt 08/15/2017 at the , at the time she dropped it off she didn't have medical insurance so would need an appt, the person at the  she states with blond short length hair told her that after her appt then the doctor would be able to fill it out. So she had an appt on 08/15 but brought in the form a week before her appt. She is asking to see if her form was found yet and if Dr. Starr has filled it out yet? She states the front desk person called her on 8/16 and asked her about the form due to not being able to find the form and told her she would look for it and let her know to call her back, but she has not heard anything. So she is asking to see if form is  found or not and it if filled. Told her that I couldn't find a copy of the form since it was dropped off, so will take a message and ask the doctor and see if she has it if not Patient might have to refax it again. She states okay. Told her to contact case working to have it fax again, she states she would contact them and have them refax just in case form is not found. Told her it could take up to two business days for a response. Please call and advise.       Pharmacy confirmed as [id number] - [place], [place] - [address]: Yes    OK to leave a message on voice mail? Yes    Primary language: English      needed? No    Call taken on August 23, 2017 at 11:01 AM by jV Merchant

## 2017-08-28 NOTE — TELEPHONE ENCOUNTER
Called Lisandra back, ok to wait until tomorrow 8/29/2017 afternoon, would like to see Dr Napoles. An appt has been scheduled with Dr Napoles. Dr Starr will not be in clinic, restricted to Dr Starr, pt to see faculty. Lisandra states she feels safe and is ok to wait.  Jose Alfredo ROSARIO

## 2017-08-29 ENCOUNTER — OFFICE VISIT (OUTPATIENT)
Dept: FAMILY MEDICINE | Facility: CLINIC | Age: 32
End: 2017-08-29

## 2017-08-29 VITALS
HEIGHT: 61 IN | OXYGEN SATURATION: 97 % | SYSTOLIC BLOOD PRESSURE: 118 MMHG | TEMPERATURE: 98.3 F | WEIGHT: 140.2 LBS | HEART RATE: 83 BPM | DIASTOLIC BLOOD PRESSURE: 81 MMHG | RESPIRATION RATE: 18 BRPM | BODY MASS INDEX: 26.47 KG/M2

## 2017-08-29 DIAGNOSIS — Z86.711 HISTORY OF PULMONARY EMBOLISM: ICD-10-CM

## 2017-08-29 DIAGNOSIS — D68.62 LUPUS ANTICOAGULANT DISORDER (H): Primary | ICD-10-CM

## 2017-08-29 DIAGNOSIS — F41.1 GAD (GENERALIZED ANXIETY DISORDER): ICD-10-CM

## 2017-08-29 LAB — INR PPP: 1.7

## 2017-08-29 RX ORDER — WARFARIN SODIUM 1 MG/1
1 TABLET ORAL DAILY
Qty: 30 TABLET | Refills: 1 | Status: SHIPPED | OUTPATIENT
Start: 2017-08-29 | End: 2018-03-01

## 2017-08-29 RX ORDER — QUETIAPINE FUMARATE 25 MG/1
25 TABLET, FILM COATED ORAL 3 TIMES DAILY PRN
Qty: 60 TABLET | Refills: 1 | Status: SHIPPED | OUTPATIENT
Start: 2017-08-29 | End: 2017-10-02

## 2017-08-29 ASSESSMENT — ANXIETY QUESTIONNAIRES
1. FEELING NERVOUS, ANXIOUS, OR ON EDGE: NEARLY EVERY DAY
GAD7 TOTAL SCORE: 19
IF YOU CHECKED OFF ANY PROBLEMS ON THIS QUESTIONNAIRE, HOW DIFFICULT HAVE THESE PROBLEMS MADE IT FOR YOU TO DO YOUR WORK, TAKE CARE OF THINGS AT HOME, OR GET ALONG WITH OTHER PEOPLE: VERY DIFFICULT
5. BEING SO RESTLESS THAT IT IS HARD TO SIT STILL: NEARLY EVERY DAY
3. WORRYING TOO MUCH ABOUT DIFFERENT THINGS: NEARLY EVERY DAY
6. BECOMING EASILY ANNOYED OR IRRITABLE: MORE THAN HALF THE DAYS
7. FEELING AFRAID AS IF SOMETHING AWFUL MIGHT HAPPEN: MORE THAN HALF THE DAYS
2. NOT BEING ABLE TO STOP OR CONTROL WORRYING: NEARLY EVERY DAY

## 2017-08-29 ASSESSMENT — PATIENT HEALTH QUESTIONNAIRE - PHQ9
5. POOR APPETITE OR OVEREATING: NEARLY EVERY DAY
SUM OF ALL RESPONSES TO PHQ QUESTIONS 1-9: 11

## 2017-08-29 NOTE — MR AVS SNAPSHOT
After Visit Summary   8/29/2017    Lisandra Arauz    MRN: [id number]           Patient Information     Date Of Birth          1985        Visit Information        Provider Department      8/29/2017 2:20 PM Joyce Napoles MD [facility]        Today's Diagnoses     Lupus anticoagulant disorder (H)    -  1    History of pulmonary embolism        ARGENTINA (generalized anxiety disorder)          Care Instructions    ~Monday and Friday take 6mg and then 5mg the other days, recheck INR in 2 weeks  ~Seroquel new prescription sent to pharmacy  ~[facility], Crisis Line - [phone]    Your medication list is printed, please keep this with you, it is helpful to bring this current list to any other medical appointments, the emergency room or hospital.    If you had lab testing today and your results are reassuring or normal they will be be mailed to you within 7 days.     If the lab tests need quick action we will call you with the results.   The phone number we will call with results is # [phone] (home) . If this is not the best number please call our clinic and change the number.    If you need any refills please call your pharmacy and they will contact us.    If you have any further concerns or wish to schedule another appointment you must call our office during normal business hours  [phone] (8-5:00 M-F)  If you have urgent medical questions that cannot wait  you may also call [phone] at any time of day.  If you have a medical emergency please call [phone].    Thank you for coming to [facility].            Follow-ups after your visit        Your next 10 appointments already scheduled     Sep 06, 2017 10:00 AM CDT   Return Visit with Flaquita Starr MD   [facility] ([facility])    [address] [place] [id number]   [phone]            Oct 02, 2017 10:00 AM CDT   CONSULT with Donaldo Carter MD   [facility]  "([facility])    [address]  [facility] [address]   [phone]              Who to contact     Please call your clinic at [phone] to:    Ask questions about your health    Make or cancel appointments    Discuss your medicines    Learn about your test results    Speak to your doctor   If you have compliments or concerns about an experience at your clinic, or if you wish to file a complaint, please contact [facility] Patient Relations at [phone] or email us at [email or web address]         Additional Information About Your Visit        [employer] Information     "[employer] is an electronic gateway that provides easy, online access to your medical records. With "[employer] you can request a clinic appointment, read your test results, renew a prescription or communicate with your care team.     To sign up for "[employer] visit the website at [email or web address]   You will be asked to enter the access code listed below, as well as some personal information. Please follow the directions to create your username and password.     Your access code is: [email or web address]  Expires: 2017  9:23 AM     Your access code will  in 90 days. If you need help or a new code, please contact your [facility] or call [phone] for assistance.        Care EveryWhere ID     This is your Care EveryWhere ID. This could be used by other organizations to access your [facility] medical records  [id number]        Your Vitals Were     Pulse Temperature Respirations Height Pulse Oximetry BMI (Body Mass Index)    83 98.3  F (36.8  C) (Oral) 18 5' 0.75\" (154.3 cm) 97% 26.71 kg/m2       Blood Pressure from Last 3 Encounters:   17 118/81   08/15/17 117/73   17 126/87    Weight from Last 3 Encounters:   17 140 lb 3.2 oz (63.6 kg)   08/15/17 143 lb (64.9 kg)   17 142 lb 6.4 oz (64.6 kg)              We Performed the Following     INR " (P )          Today's Medication Changes          These changes are accurate as of: 8/29/17  2:55 PM.  If you have any questions, ask your nurse or doctor.               Start taking these medicines.        Dose/Directions    QUEtiapine 25 MG tablet   Commonly known as:  SEROQUEL   Used for:  ARGENTINA (generalized anxiety disorder)   Started by:  Joyce Napoles MD        Dose:  25 mg   Take 1 tablet (25 mg) by mouth 3 times daily as needed For anxiety attacks   Quantity:  60 tablet   Refills:  1         Stop taking these medicines if you haven't already. Please contact your care team if you have questions.     clonazePAM 0.5 MG tablet   Commonly known as:  klonoPIN   Stopped by:  Joyce Napoles MD                Where to get your medicines      These medications were sent to [id number] - [place], [place] - [address] AT [facility] & [address], [place] [address]     Phone:  [phone]     QUEtiapine 25 MG tablet                Primary Care Provider Office Phone # Fax #    Flaquita Shabnam Starr [phone]       [facility] [address] [facility] [address]        Equal Access to Services     SHA MCCRAY AH: Hadii aad ku hadasho Soomaali, waaxda luqadaha, qaybta kaalmada adeegyada, waxay idiin hayaan adehaven lunaaragregory laelie ah. So [facility] [phone].    ATENCIÓN: Si habla español, tiene a rivas disposición servicios gratuitos de asistencia lingüística. [facility] [phone].    We comply with applicable federal civil rights laws and [place] laws. We do not discriminate on the basis of race, color, national origin, age, disability sex, sexual orientation or gender identity.            Thank you!     Thank you for choosing [facility]  for your care. Our goal is always to provide you with excellent care. Hearing back from our patients is one way we can continue to improve our services. Please take a few minutes  to complete the written survey that you may receive in the mail after your visit with us. Thank you!             Your Updated Medication List - Protect others around you: Learn how to safely use, store and throw away your medicines at www.disposemymeds.org.          This list is accurate as of: 8/29/17  2:55 PM.  Always use your most recent med list.                   Brand Name Dispense Instructions for use Diagnosis    melatonin 3 MG tablet     30 tablet    Take 1 tablet (3 mg) by mouth nightly as needed for sleep    Psychophysiological insomnia       PARoxetine 40 MG tablet    PAXIL    30 tablet    Take 1 tablet (40 mg) by mouth At Bedtime    Generalized anxiety disorder       QUEtiapine 25 MG tablet    SEROQUEL    60 tablet    Take 1 tablet (25 mg) by mouth 3 times daily as needed For anxiety attacks    ARGENTINA (generalized anxiety disorder)       warfarin 5 MG tablet    COUMADIN    30 tablet    Take 1 tablet (5 mg) by mouth daily    Lupus anticoagulant syndrome (H)
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
## 2017-08-29 NOTE — PATIENT INSTRUCTIONS
~Monday and Friday take 6mg and then 5mg the other days, recheck INR in 2 weeks  ~Seroquel new prescription sent to pharmacy  ~[facility], Crisis Line - [phone]    Your medication list is printed, please keep this with you, it is helpful to bring this current list to any other medical appointments, the emergency room or hospital.    If you had lab testing today and your results are reassuring or normal they will be be mailed to you within 7 days.     If the lab tests need quick action we will call you with the results.   The phone number we will call with results is # [phone] (home) . If this is not the best number please call our clinic and change the number.    If you need any refills please call your pharmacy and they will contact us.    If you have any further concerns or wish to schedule another appointment you must call our office during normal business hours  [phone] (8-5:00 M-F)  If you have urgent medical questions that cannot wait  you may also call [phone] at any time of day.  If you have a medical emergency please call [phone].    Thank you for coming to [facility].

## 2017-08-30 ASSESSMENT — ANXIETY QUESTIONNAIRES: GAD7 TOTAL SCORE: 19

## 2017-09-21 DIAGNOSIS — F43.22 ADJUSTMENT DISORDER WITH ANXIOUS MOOD: Primary | ICD-10-CM

## 2017-09-21 NOTE — TELEPHONE ENCOUNTER
Patient called and stated that Quetiapine not effective and would like to go back with Clonazepam 0.5 mg again.

## 2017-09-25 NOTE — TELEPHONE ENCOUNTER
Patient is wondering if someone from the clinic can call her to let her know that this has been sent over since she isn't being notified by the pharmacy.

## 2017-09-26 NOTE — TELEPHONE ENCOUNTER
Patient is calling to check if Clonazepam has been send to the pharmacy yet? Please call and advise.

## 2017-09-29 NOTE — TELEPHONE ENCOUNTER
Patient calling back to check if her prescription is ready yet. Checked chart, Melina had flagged it as urgent for Dr. Starr to respond. Patient wants call back today before clinic close. Please call and advise. Phone # [phone].

## 2017-09-29 NOTE — TELEPHONE ENCOUNTER
Called patient and is upset that there is no response to her medication request. Apologized and informed her I will talk to Dr. Starr when she is in clinic on Monday morning for a response.  Stated she had discussed with Dr. Starr before that she could go back on clonazepam as long as she keeps her psychiatrist appointment on 10/2/17. Pt stated if she does not hear anything by 10am, she will walk in to clinic to get RX.

## 2017-10-02 ENCOUNTER — OFFICE VISIT (OUTPATIENT)
Dept: PSYCHOLOGY | Facility: CLINIC | Age: 32
End: 2017-10-02

## 2017-10-02 VITALS
RESPIRATION RATE: 22 BRPM | DIASTOLIC BLOOD PRESSURE: 85 MMHG | TEMPERATURE: 98.8 F | HEIGHT: 61 IN | WEIGHT: 157.8 LBS | BODY MASS INDEX: 29.79 KG/M2 | SYSTOLIC BLOOD PRESSURE: 130 MMHG | OXYGEN SATURATION: 100 % | HEART RATE: 74 BPM

## 2017-10-02 DIAGNOSIS — Z12.4 PAP SMEAR FOR CERVICAL CANCER SCREENING: ICD-10-CM

## 2017-10-02 DIAGNOSIS — F41.1 GENERALIZED ANXIETY DISORDER: Primary | ICD-10-CM

## 2017-10-02 LAB
ALBUMIN SERPL-MCNC: 3.7 G/DL (ref 3.3–4.6)
ALP SERPL-CCNC: 60 U/L (ref 40–150)
ALT SERPL-CCNC: 22 U/L (ref 0–45)
AST SERPL-CCNC: 25 U/L (ref 0–45)
BILIRUB SERPL-MCNC: 0.4 MG/DL (ref 0.2–1.3)
BUN SERPL-MCNC: 10 MG/DL (ref 5–24)
CALCIUM SERPL-MCNC: 9.5 MG/DL (ref 8.5–10.4)
CHLORIDE SERPLBLD-SCNC: 105 MMOL/L (ref 94–109)
CO2 SERPL-SCNC: 27 MMOL/L (ref 20–32)
CREAT SERPL-MCNC: 1 MG/DL (ref 0.6–1.3)
EGFR CALCULATED (BLACK REFERENCE): 82.1 ML/MIN
EGFR CALCULATED (NON BLACK REFERENCE): 67.9 ML/MIN
GLUCOSE SERPL-MCNC: 90 MG/DL (ref 60–109)
POTASSIUM SERPL-SCNC: 4.8 MMOL/L (ref 3.4–5.3)
PROT SERPL-MCNC: 7.4 G/DL (ref 6.6–8.8)
SODIUM SERPL-SCNC: 136 MMOL/L (ref 133–144)
TSH SERPL DL<=0.05 MIU/L-ACNC: 0.87 UIU/ML (ref 0.3–5)

## 2017-10-02 RX ORDER — DULOXETIN HYDROCHLORIDE 30 MG/1
CAPSULE, DELAYED RELEASE ORAL
Qty: 60 CAPSULE | Refills: 1 | Status: SHIPPED | OUTPATIENT
Start: 2017-10-02 | End: 2018-03-20

## 2017-10-02 RX ORDER — CLONAZEPAM 0.5 MG/1
0.5 TABLET ORAL 2 TIMES DAILY PRN
Qty: 60 TABLET | Refills: 0 | Status: SHIPPED | OUTPATIENT
Start: 2017-10-02 | End: 2018-03-12

## 2017-10-02 NOTE — TELEPHONE ENCOUNTER
Handed printed copy of Clonazepam to Dr Carter, after his meeting/appt with Lisandra he will determine if this is appropriate treatment for her as well. If it is, he will give her this prescription. Jose Alfredo ROSARIO

## 2017-10-02 NOTE — TELEPHONE ENCOUNTER
Patient coming in to see Dr. Carter this morning. I have not met her and looks like she was having side effects from clonazepam and that it wasn't very effective anyway. I will refill a 1 month supply rather than the 3 month supply requested, and she will be needing to follow up with PCP Dr. Starr in the next few weeks in any case after mental health consultation. Discussed with Dr. Carter and Dr. Marquis.

## 2017-10-02 NOTE — PATIENT INSTRUCTIONS
Important Takeaway Points From This Visit:    We are starting a new medication for you today; however, this will take a little while to work.    This medication is called Cymbalta. Start taking this medication at 30 mg    In 2 weeks we would like you to increase this medication up to 60 mg    Please see us again to discuss how you are doing on this medication in 2 weeks    It is very important to continue seeing your therapist on a weekly bases and continue to see your family medicine physician for your other health concerns.    At your next visit we will also discuss another medication that can help with sleep and nightmares called Prazosin.      As always, please call with any questions or concerns. I look forward to seeing you again soon!    Take care,  Dr. Donaldo Carter    Your current medication list is printed. Please keep this with you - it is helpful to bring this current list to any other medical appointments. It can also be helpful if you ever go to the emergency room or hospital.    If you had lab testing today we will call you with the results. The phone number we will call with your results is # [phone] (home) . If this is not the best number please call our clinic and change the number.    If you need any refills, please call your pharmacy and they will contact us.    If you have any further concerns or wish to schedule another appointment, please call our office at [phone] during normal business hours (8-5, M-F).    If you have urgent medical questions that cannot wait, you may call [phone] at any time of day.    If you have a medical emergency, please call [phone].    Thank you for coming to [facility].
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
## 2017-10-02 NOTE — MR AVS SNAPSHOT
After Visit Summary   10/2/2017    Lisandra Arauz    MRN: [id number]           Patient Information     Date Of Birth          1985        Visit Information        Provider Department      10/2/2017 10:00 AM Donaldo Carter MD [facility]        Today's Diagnoses     Generalized anxiety disorder          Care Instructions    Important Takeaway Points From This Visit:    We are starting a new medication for you today; however, this will take a little while to work.    This medication is called Cymbalta. Start taking this medication at 30 mg    In 2 weeks we would like you to increase this medication up to 60 mg    Please see us again to discuss how you are doing on this medication in 2 weeks    It is very important to continue seeing your therapist on a weekly bases and continue to see your family medicine physician for your other health concerns.    At your next visit we will also discuss another medication that can help with sleep and nightmares called Prazosin.      As always, please call with any questions or concerns. I look forward to seeing you again soon!    Take care,  Dr. Donaldo Carter    Your current medication list is printed. Please keep this with you - it is helpful to bring this current list to any other medical appointments. It can also be helpful if you ever go to the emergency room or hospital.    If you had lab testing today we will call you with the results. The phone number we will call with your results is # [phone] (home) . If this is not the best number please call our clinic and change the number.    If you need any refills, please call your pharmacy and they will contact us.    If you have any further concerns or wish to schedule another appointment, please call our office at [phone] during normal business hours (8-5, M-F).    If you have urgent medical questions that cannot wait, you may call [phone] at any time of day.    If you have a medical  "emergency, please call 911.    Thank you for coming to [facility].              Follow-ups after your visit        Who to contact     Please call your clinic at [phone] to:    Ask questions about your health    Make or cancel appointments    Discuss your medicines    Learn about your test results    Speak to your doctor   If you have compliments or concerns about an experience at your clinic, or if you wish to file a complaint, please contact [facility] Patient Relations at [phone] or email us at [email or web address]         Additional Information About Your Visit        [employer] Information     [employer] is an electronic gateway that provides easy, online access to your medical records. With [employer], you can request a clinic appointment, read your test results, renew a prescription or communicate with your care team.     To sign up for [employer] visit the website at [email or web address]   You will be asked to enter the access code listed below, as well as some personal information. Please follow the directions to create your username and password.     Your access code is: [email or web address]  Expires: 2017  9:23 AM     Your access code will  in 90 days. If you need help or a new code, please contact your [facility] or call [phone] for assistance.        Care EveryWhere ID     This is your Care EveryWhere ID. This could be used by other organizations to access your [place] medical records  [id number]        Your Vitals Were     Pulse Temperature Respirations Height Pulse Oximetry BMI (Body Mass Index)    74 98.8  F (37.1  C) 22 5' 0.75\" (154.3 cm) 100% 30.06 kg/m2       Blood Pressure from Last 3 Encounters:   10/02/17 130/85   17 118/81   08/15/17 117/73    Weight from Last 3 Encounters:   10/02/17 157 lb 12.8 oz (71.6 kg)   17 140 lb 3.2 oz (63.6 kg)   08/15/17 143 lb (64.9 kg)              We " Performed the Following     Comprehensive Metabolic Panel ([facility]) - results <1hr Protocol     Thyroid [place] ([facility])          Today's Medication Changes          These changes are accurate as of: 10/2/17 11:13 AM.  If you have any questions, ask your nurse or doctor.               Start taking these medicines.        Dose/Directions    DULoxetine 30 MG EC capsule   Commonly known as:  CYMBALTA   Used for:  Generalized anxiety disorder   Started by:  Donaldo Carter MD        Take 1 capsule (30 mg) by mouth every morning for 2 weeks starting today (10/2/17).  In 2 weeks (starting on 10/16/17) increase your dose to 2 capsules (60 mg total) every morning.   Quantity:  60 capsule   Refills:  1         Stop taking these medicines if you haven't already. Please contact your care team if you have questions.     PARoxetine 40 MG tablet   Commonly known as:  PAXIL   Stopped by:  Donaldo Carter MD                Where to get your medicines      Some of these will need a paper prescription and others can be bought over the counter.  Ask your nurse if you have questions.     Bring a paper prescription for each of these medications     DULoxetine 30 MG EC capsule                Primary Care Provider Office Phone # Fax #    Flaquita Shabnam Starr [phone]       [facility] [address] [address]        Equal Access to Services     ELSIE MCCRAY AH: Hadii bridgett linda hadasho Soomaali, waaxda luqadaha, qaybta kaalmada adeegyada, janis whitmore. So [facility] [phone].    ATENCIÓN: Si habla español, tiene a rivas disposición servicios gratuitos de asistencia lingüística. Llame al [phone].    We comply with applicable federal civil rights laws and [place] laws. We do not discriminate on the basis of race, color, national origin, age, disability, sex, sexual orientation, or gender identity.            Thank you!     Thank you for choosing [facility]  for your care. Our goal is always to provide you with excellent care. Hearing back from our patients is one way we can continue to improve our services. Please take a few minutes to complete the written survey that you may receive in the mail after your visit with us. Thank you!             Your Updated Medication List - Protect others around you: Learn how to safely use, store and throw away your medicines at www.disposemymeds.org.          This list is accurate as of: 10/2/17 11:13 AM.  Always use your most recent med list.                   Brand Name Dispense Instructions for use Diagnosis    clonazePAM 0.5 MG tablet    klonoPIN    60 tablet    Take 1 tablet (0.5 mg) by mouth 2 times daily as needed for anxiety    Adjustment disorder with anxious mood       DULoxetine 30 MG EC capsule    CYMBALTA    60 capsule    Take 1 capsule (30 mg) by mouth every morning for 2 weeks starting today (10/2/17).  In 2 weeks (starting on 10/16/17) increase your dose to 2 capsules (60 mg total) every morning.    Generalized anxiety disorder       melatonin 3 MG tablet     30 tablet    Take 1 tablet (3 mg) by mouth nightly as needed for sleep    Psychophysiological insomnia       * warfarin 5 MG tablet    COUMADIN    30 tablet    Take 1 tablet (5 mg) by mouth daily    Lupus anticoagulant syndrome (H)       * warfarin 1 MG tablet    COUMADIN    30 tablet    Take 1 tablet (1 mg) by mouth daily Add to 5 mg on Mondays and Fridays only    Lupus anticoagulant disorder (H)       * Notice:  This list has 2 medication(s) that are the same as other medications prescribed for you. Read the directions carefully, and ask your doctor or other care provider to review them with you.

## 2017-10-03 ASSESSMENT — PATIENT HEALTH QUESTIONNAIRE - PHQ9: SUM OF ALL RESPONSES TO PHQ QUESTIONS 1-9: 10

## 2017-10-09 NOTE — PROGRESS NOTES
Preceptor Attestation:  Patient's case reviewed and discussed with Donaldo Carter MD Patient seen and discussed with the resident.. I agree with assessment and plan of care.  Supervising Physician:  Nacho Torres MD  [facility]

## 2017-10-18 ENCOUNTER — OFFICE VISIT - HEALTHEAST (OUTPATIENT)
Dept: RHEUMATOLOGY | Facility: CLINIC | Age: 32
End: 2017-10-18

## 2017-10-18 DIAGNOSIS — M79.10 MYALGIA: ICD-10-CM

## 2017-10-18 DIAGNOSIS — I26.99 PULMONARY EMBOLISM (H): ICD-10-CM

## 2017-10-18 LAB
ALT SERPL W P-5'-P-CCNC: 17 U/L (ref 0–45)
C3 SERPL-MCNC: 153 MG/DL (ref 83–177)
C4 SERPL-MCNC: 27 MG/DL (ref 19–59)
CREAT SERPL-MCNC: 0.79 MG/DL (ref 0.6–1.1)
GFR SERPL CREATININE-BSD FRML MDRD: >60 ML/MIN/1.73M2

## 2017-10-18 ASSESSMENT — MIFFLIN-ST. JEOR: SCORE: 1316.38

## 2017-10-19 LAB
ANA SER QL: 0.4 U
HBV SURFACE AG SERPL QL IA: NEGATIVE
HCV AB SERPL QL IA: NEGATIVE

## 2017-10-20 LAB
C-ANCA - HISTORICAL: NEGATIVE
P-ANCA - HISTORICAL: NEGATIVE
PHOSPHOLIPID AB IGA: <9.4 APL
PHOSPHOLIPID AB IGG, S: <9.4 GPL
PHOSPHOLIPID AB IGM, S: <9.4 MPL

## 2017-10-25 LAB — DRVVT, LUPUS ANTICOAGULANT - HISTORICAL: 52 SEC

## 2017-11-07 ENCOUNTER — COMMUNICATION - HEALTHEAST (OUTPATIENT)
Dept: RHEUMATOLOGY | Facility: CLINIC | Age: 32
End: 2017-11-07

## 2017-11-07 DIAGNOSIS — I26.99 PULMONARY EMBOLISM (H): ICD-10-CM

## 2017-11-09 ENCOUNTER — COMMUNICATION - HEALTHEAST (OUTPATIENT)
Dept: ONCOLOGY | Facility: HOSPITAL | Age: 32
End: 2017-11-09

## 2017-11-10 ENCOUNTER — COMMUNICATION - HEALTHEAST (OUTPATIENT)
Dept: ONCOLOGY | Facility: HOSPITAL | Age: 32
End: 2017-11-10

## 2017-11-13 ENCOUNTER — COMMUNICATION - HEALTHEAST (OUTPATIENT)
Dept: ONCOLOGY | Facility: HOSPITAL | Age: 32
End: 2017-11-13

## 2018-01-31 ENCOUNTER — TRANSFERRED RECORDS (OUTPATIENT)
Dept: HEALTH INFORMATION MANAGEMENT | Facility: CLINIC | Age: 33
End: 2018-01-31

## 2018-02-09 ENCOUNTER — TELEPHONE (OUTPATIENT)
Dept: FAMILY MEDICINE | Facility: CLINIC | Age: 33
End: 2018-02-09

## 2018-02-09 NOTE — TELEPHONE ENCOUNTER
Ed follow up    Pt seen for right side flank pain and abdominal pain is lupus pt    Called and left message  [email or web address]

## 2018-03-01 DIAGNOSIS — D68.62 LUPUS ANTICOAGULANT DISORDER (H): ICD-10-CM

## 2018-03-02 RX ORDER — WARFARIN SODIUM 1 MG/1
1 TABLET ORAL DAILY
Qty: 30 TABLET | Refills: 0 | Status: SHIPPED | OUTPATIENT
Start: 2018-03-02 | End: 2018-03-28

## 2018-03-12 ENCOUNTER — OFFICE VISIT (OUTPATIENT)
Dept: FAMILY MEDICINE | Facility: CLINIC | Age: 33
End: 2018-03-12
Payer: COMMERCIAL

## 2018-03-12 VITALS
HEART RATE: 107 BPM | WEIGHT: 156 LBS | BODY MASS INDEX: 28.71 KG/M2 | SYSTOLIC BLOOD PRESSURE: 146 MMHG | RESPIRATION RATE: 20 BRPM | HEIGHT: 62 IN | TEMPERATURE: 98.8 F | DIASTOLIC BLOOD PRESSURE: 88 MMHG | OXYGEN SATURATION: 99 %

## 2018-03-12 DIAGNOSIS — N92.0 MENORRHAGIA WITH REGULAR CYCLE: ICD-10-CM

## 2018-03-12 DIAGNOSIS — D68.62 LUPUS ANTICOAGULANT SYNDROME (H): Primary | ICD-10-CM

## 2018-03-12 DIAGNOSIS — F43.22 ADJUSTMENT DISORDER WITH ANXIOUS MOOD: ICD-10-CM

## 2018-03-12 LAB — INR PPP: 1

## 2018-03-12 RX ORDER — WARFARIN SODIUM 5 MG/1
5 TABLET ORAL DAILY
Qty: 90 TABLET | Refills: 1 | Status: SHIPPED | OUTPATIENT
Start: 2018-03-12 | End: 2018-05-18

## 2018-03-12 RX ORDER — CLONAZEPAM 0.5 MG/1
0.5 TABLET ORAL 2 TIMES DAILY PRN
Qty: 60 TABLET | Refills: 0 | Status: SHIPPED | OUTPATIENT
Start: 2018-03-12 | End: 2018-03-20

## 2018-03-12 RX ORDER — CITALOPRAM HYDROBROMIDE 10 MG/1
TABLET ORAL
Qty: 60 TABLET | Refills: 0 | Status: SHIPPED | OUTPATIENT
Start: 2018-03-12 | End: 2018-03-13

## 2018-03-12 ASSESSMENT — ANXIETY QUESTIONNAIRES
IF YOU CHECKED OFF ANY PROBLEMS ON THIS QUESTIONNAIRE, HOW DIFFICULT HAVE THESE PROBLEMS MADE IT FOR YOU TO DO YOUR WORK, TAKE CARE OF THINGS AT HOME, OR GET ALONG WITH OTHER PEOPLE: SOMEWHAT DIFFICULT
2. NOT BEING ABLE TO STOP OR CONTROL WORRYING: NEARLY EVERY DAY
1. FEELING NERVOUS, ANXIOUS, OR ON EDGE: NEARLY EVERY DAY
7. FEELING AFRAID AS IF SOMETHING AWFUL MIGHT HAPPEN: NOT AT ALL
6. BECOMING EASILY ANNOYED OR IRRITABLE: NEARLY EVERY DAY
5. BEING SO RESTLESS THAT IT IS HARD TO SIT STILL: MORE THAN HALF THE DAYS
GAD7 TOTAL SCORE: 16
3. WORRYING TOO MUCH ABOUT DIFFERENT THINGS: MORE THAN HALF THE DAYS

## 2018-03-12 ASSESSMENT — PATIENT HEALTH QUESTIONNAIRE - PHQ9: 5. POOR APPETITE OR OVEREATING: NEARLY EVERY DAY

## 2018-03-12 NOTE — PROGRESS NOTES
HPI:       Lisandra Arauz is a 32 year old  female who presents to address the following concerns:    This is a 32-year-old female with known history of lupus anticoagulant syndrome presenting for ongoing issues with mental health.  She attends clinic sporadic to sporadically.  To this point primarily comes to clinic for acute needs.  Today she presents with her son who is 3 years old.  She reports that she is having significant issues with anxiety and depression.  She reports that she is having significant issues for sleep.  Sleep is her biggest symptom.  She reports that sleep is disrupted by many factors including her mind spinning and inability to calm down sleep disruption and her son.  She also reports that she recently had a cousin who committed suicide and that she is having a lot of difficulty with this.  She reports that she would never kill herself but sometimes does think that death is the only way out of her problems.  She is connected to 2 of her children her son was present with her today and her older daughter and reports that she would never hurt herself because of that relationship.  Today patient is looking for help with daytime anxiety as well as sleep.  She is also looking to reestablish care for warfarin and INR related to lupus anticoagulant syndrome.  She has in the past taken short-acting benzodiazepines for symptoms and has found this helpful.  She had a psychiatry visit through our clinic last fall at that time recommendations were made for SSRI.  She did not follow-up after this visit as recommended.  At that visit as this is most also recommended patient does not find this helpful she reports that she did not have trouble with nightmares.  She does have a history of trauma as a child.           PMHX:     Patient Active Problem List   Diagnosis     Lupus anticoagulant syndrome (H)     Intractable chronic migraine without aura     Gastroesophageal reflux disease     Chronic pain      Dysfunctional uterine bleeding     Generalized anxiety disorder     Obesity (BMI 30.0-34.9)     Restless legs syndrome     Cervical intraepithelial neoplasia grade III with severe dysplasia     Tobacco use disorder     Pap smear for cervical cancer screening     PTSD (post-traumatic stress disorder)       Current Outpatient Prescriptions   Medication Sig Dispense Refill     warfarin (COUMADIN) 1 MG tablet Take 1 tablet (1 mg) by mouth daily Add to 5 mg on Mondays and Fridays only 30 tablet 0     clonazePAM (KLONOPIN) 0.5 MG tablet Take 1 tablet (0.5 mg) by mouth 2 times daily as needed for anxiety 60 tablet 0     warfarin (COUMADIN) 5 MG tablet Take 1 tablet (5 mg) by mouth daily 30 tablet 0     DULoxetine (CYMBALTA) 30 MG EC capsule Take 1 capsule (30 mg) by mouth every morning for 2 weeks starting today (10/2/17).   In 2 weeks (starting on 10/16/17) increase your dose to 2 capsules (60 mg total) every morning. (Patient not taking: Reported on 3/12/2018) 60 capsule 1          Allergies   Allergen Reactions     Hydrocodone-Acetaminophen Anaphylaxis     Phenothiazines      Other reaction(s): Wheezing     Prochlorperazine Anaphylaxis, Other (See Comments) and Swelling     Gabapentin Other (See Comments)     Nausea and vomiting, anxiety     Oxycodone-Acetaminophen Other (See Comments)     itching     Tramadol Other (See Comments)     Mood swings, anger.        Results for orders placed or performed in visit on 03/12/18 (from the past 24 hour(s))   INR ([facility])   Result Value Ref Range    INR 1.0        Current Outpatient Prescriptions   Medication     warfarin (COUMADIN) 1 MG tablet     clonazePAM (KLONOPIN) 0.5 MG tablet     warfarin (COUMADIN) 5 MG tablet     DULoxetine (CYMBALTA) 30 MG EC capsule     No current facility-administered medications for this visit.               Review of Systems:   ROS as described above.  Denies F/S/C/N/V/SOB/CP          Physical Exam:     Vitals:    03/12/18 1023   BP: 146/88   Pulse:  "107   Resp: 20   Temp: 98.8  F (37.1  C)   TempSrc: Oral   SpO2: 99%   Weight: 156 lb (70.8 kg)   Height: 5' 1.5\" (156.2 cm)     Body mass index is 29 kg/(m^2).    GEN: fatigued and anxious appearing  HEEN: Head is atraumatic, normocephalic, eyes anicteric, mucous membranes moist  CV: RRR w/o M/R/G  PULM: CTAB without w/r/r  ABD: soft, nontender, bowel sounds present  NEURO: Alert and oriented x3.  No focal motor abnormalities.  Face symmetric.  PSYCH: patient quite anxious in the room today.  Pressured speech.  Occasionally tearful.  Appropriate with son.    SKIN: No rashes, bruising, or other lesions    Results for orders placed or performed in visit on 03/12/18   INR ([facility])   Result Value Ref Range    INR 1.0        Assessment and Plan     1. Lupus anticoagulant syndrome (H)-  - INR ([facility])  - warfarin (COUMADIN) 5 MG tablet; Take 1 tablet (5 mg) by mouth daily  Dispense: 90 tablet; Refill: 1    2. Adjustment disorder with anxious mood  -recommend restarting SSRI which patient is in agreement with  -scheduled klonopin to help with anxiety    3. Dysmenorrhea: patient and cramping that patient cannot tolerate during period.  Possible mittelschmerz.  Some aspects hx concerning for endometriosis.    - OB/GYN REFERRAL    Options for treatment and follow-up care were reviewed with the patient and/or guardian. Lisandra Arauz and/or guardian engaged in the decision making process and verbalized understanding of the options discussed and agreed with the final plan.    Flaquita Starr MD            "

## 2018-03-12 NOTE — PATIENT INSTRUCTIONS
- start taking citalopram 10mg everyday and follow up in 1 week with Dr. Starr. After starting this medication and it's not working please come back sooner to see Dr. Starr.  - take aleve for the menstrual cramp.   - Have grandma help take your son for 1 to 2 nights for you to catch up on your sleep.    Your medication list is printed, please keep this with you, it is helpful to bring this current list to any other medical appointments, the emergency room or hospital.    If you had lab testing today and your results are reassuring or normal they will be be mailed to you within 7 days.     If the lab tests need quick action we will call you with the results.   The phone number we will call with results is # [phone] (home) . If this is not the best number please call our clinic and change the number.    If you need any refills please call your pharmacy and they will contact us.    If you have any further concerns or wish to schedule another appointment you must call our office during normal business hours  [phone] (8-5:00 M-F)  If you have urgent medical questions that cannot wait  you may also call [phone] at any time of day.  If you have a medical emergency please call [phone].    Thank you for coming to [facility].      Referral for OB/GYN faxed to [facility]  [phone]  [phone]  Clinic will contact patient to schedule appointment.      with INR goal: Other (2.3-3.2).      INR today below goal range.     Using Warfarin  , weekly Warfarin dosing as follows:     Sunday Dose: 5mg Monday Dose: 5mg Tuesday Dose: 5mg Wednesday Dose: 5mg Thursday Dose: 5mg Friday Dose: 5mg Saturday Dose: 5mg   Additional Week Dosing (will be blank, if not needed)    mg   mg   mg   mg   mg   mg   mg        Recheck INR: 2 weeks is recommended.   Approximate. appt 3/20/18

## 2018-03-12 NOTE — MR AVS SNAPSHOT
After Visit Summary   3/12/2018    Lisandra Arauz    MRN: [id number]           Patient Information     Date Of Birth          1985        Visit Information        Provider Department      3/12/2018 10:40 AM Flaquita Starr MD [facility]        Today's Diagnoses     Lupus anticoagulant syndrome (H)    -  1    Adjustment disorder with anxious mood        Menorrhagia with regular cycle          Care Instructions    - start taking citalopram 10mg everyday and follow up in 1 week with Dr. Starr. After starting this medication and it's not working please come back sooner to see Dr. Starr.  - take aleve for the menstrual cramp.   - Have grandma help take your son for 1 to 2 nights for you to catch up on your sleep.    Your medication list is printed, please keep this with you, it is helpful to bring this current list to any other medical appointments, the emergency room or hospital.    If you had lab testing today and your results are reassuring or normal they will be be mailed to you within 7 days.     If the lab tests need quick action we will call you with the results.   The phone number we will call with results is # [phone] (home) . If this is not the best number please call our clinic and change the number.    If you need any refills please call your pharmacy and they will contact us.    If you have any further concerns or wish to schedule another appointment you must call our office during normal business hours  [phone] (8-5:00 M-F)  If you have urgent medical questions that cannot wait  you may also call [phone] at any time of day.  If you have a medical emergency please call [phone].    Thank you for coming to [facility].                Follow-ups after your visit        Additional Services     OB/GYN REFERRAL       Patient to stop at the [facility] Desk    Reason for Referral: Painful menstrual cramping. Question endometriosis.      needed:  "No  Language: English    May leave message on voicemail: Yes    ([facility] Only) Referral should be tracked (Yes/No)? NO                  Your next 10 appointments already scheduled     Mar 20, 2018  2:00 PM CDT   Return Visit with Flaquita Starr MD   [facility] ([facility])    [address] [place] [address]   [phone]              Who to contact     Please call your clinic at [phone] to:    Ask questions about your health    Make or cancel appointments    Discuss your medicines    Learn about your test results    Speak to your doctor            Additional Information About Your Visit        Care EveryWhere ID     This is your Care EveryWhere ID. This could be used by other organizations to access your [facility] medical records  [id number]        Your Vitals Were     Pulse Temperature Respirations Height Last Period Pulse Oximetry    107 98.8  F (37.1  C) (Oral) 20 5' 1.5\" (156.2 cm) 02/22/2018 99%    BMI (Body Mass Index)                   29 kg/m2            Blood Pressure from Last 3 Encounters:   03/12/18 146/88   10/02/17 130/85   08/29/17 118/81    Weight from Last 3 Encounters:   03/12/18 156 lb (70.8 kg)   10/02/17 157 lb 12.8 oz (71.6 kg)   08/29/17 140 lb 3.2 oz (63.6 kg)              We Performed the Following     INR ([facility])     OB/GYN REFERRAL          Today's Medication Changes          These changes are accurate as of 3/12/18 11:27 AM.  If you have any questions, ask your nurse or doctor.               Start taking these medicines.        Dose/Directions    citalopram 10 MG tablet   Commonly known as:  celeXA   Used for:  Adjustment disorder with anxious mood   Started by:  Flaquita Starr MD        Take one tablet a day then increase to two tablets daily   Quantity:  60 tablet   Refills:  0         These medicines have changed or have updated prescriptions.        Dose/Directions    * warfarin 5 MG tablet   Commonly known as:  COUMADIN   This " may have changed:  Another medication with the same name was added. Make sure you understand how and when to take each.   Used for:  Lupus anticoagulant syndrome (H)   Changed by:  Flaquita Starr MD        Dose:  5 mg   Take 1 tablet (5 mg) by mouth daily   Quantity:  30 tablet   Refills:  0       * warfarin 1 MG tablet   Commonly known as:  COUMADIN   This may have changed:  Another medication with the same name was added. Make sure you understand how and when to take each.   Used for:  Lupus anticoagulant disorder (H)   Changed by:  Flaquita Starr MD        Dose:  1 mg   Take 1 tablet (1 mg) by mouth daily Add to 5 mg on Mondays and Fridays only   Quantity:  30 tablet   Refills:  0       * warfarin 5 MG tablet   Commonly known as:  COUMADIN   This may have changed:  You were already taking a medication with the same name, and this prescription was added. Make sure you understand how and when to take each.   Used for:  Lupus anticoagulant syndrome (H)   Changed by:  Flaquita Starr MD        Dose:  5 mg   Take 1 tablet (5 mg) by mouth daily   Quantity:  90 tablet   Refills:  1       * Notice:  This list has 3 medication(s) that are the same as other medications prescribed for you. Read the directions carefully, and ask your doctor or other care provider to review them with you.      Stop taking these medicines if you haven't already. Please contact your care team if you have questions.     melatonin 3 MG tablet   Stopped by:  Flaquita Starr MD                Where to get your medicines      These medications were sent to [facility] [id number] [place] [address] AT [place] [address], [address] [facility] [address]     Phone:  [phone]     citalopram 10 MG tablet    warfarin 5 MG tablet         Some of these will need a paper prescription and others can be bought over the counter.  Ask your nurse if you have questions.     Bring a  paper prescription for each of these medications     clonazePAM 0.5 MG tablet                Primary Care Provider Office Phone # Fax #    Flaquita Shabnam Starr [phone]       [facility] [address] [place] [address]        Equal Access to Services     [facility] HospitalJEANIE : Hadii aad ku hadasho Soomaali, waaxda luqadaha, qaybta kaalmada adeegyada, waxay idiin hayaan adehaven lunaflipgregory whitmore. So [facility] [phone].    ATENCIÓN: Si habla español, tiene a rivas disposición servicios gratuitos de asistencia lingüística. Llame al [phone].    We comply with applicable federal civil rights laws and [place] laws. We do not discriminate on the basis of race, color, national origin, age, disability, sex, sexual orientation, or gender identity.            Thank you!     Thank you for choosing [facility]  for your care. Our goal is always to provide you with excellent care. Hearing back from our patients is one way we can continue to improve our services. Please take a few minutes to complete the written survey that you may receive in the mail after your visit with us. Thank you!             Your Updated Medication List - Protect others around you: Learn how to safely use, store and throw away your medicines at www.disposemymeds.org.          This list is accurate as of 3/12/18 11:27 AM.  Always use your most recent med list.                   Brand Name Dispense Instructions for use Diagnosis    citalopram 10 MG tablet    celeXA    60 tablet    Take one tablet a day then increase to two tablets daily    Adjustment disorder with anxious mood       clonazePAM 0.5 MG tablet    klonoPIN    60 tablet    Take 1 tablet (0.5 mg) by mouth 2 times daily as needed for anxiety    Adjustment disorder with anxious mood       DULoxetine 30 MG EC capsule    CYMBALTA    60 capsule    Take 1 capsule (30 mg) by mouth every morning for 2 weeks starting today (10/2/17).  In 2 weeks (starting on 10/16/17)  increase your dose to 2 capsules (60 mg total) every morning.    Generalized anxiety disorder       * warfarin 5 MG tablet    COUMADIN    30 tablet    Take 1 tablet (5 mg) by mouth daily    Lupus anticoagulant syndrome (H)       * warfarin 1 MG tablet    COUMADIN    30 tablet    Take 1 tablet (1 mg) by mouth daily Add to 5 mg on Mondays and Fridays only    Lupus anticoagulant disorder (H)       * warfarin 5 MG tablet    COUMADIN    90 tablet    Take 1 tablet (5 mg) by mouth daily    Lupus anticoagulant syndrome (H)       * Notice:  This list has 3 medication(s) that are the same as other medications prescribed for you. Read the directions carefully, and ask your doctor or other care provider to review them with you.

## 2018-03-13 ENCOUNTER — TELEPHONE (OUTPATIENT)
Dept: FAMILY MEDICINE | Facility: CLINIC | Age: 33
End: 2018-03-13

## 2018-03-13 DIAGNOSIS — F43.22 ADJUSTMENT DISORDER WITH ANXIOUS MOOD: ICD-10-CM

## 2018-03-13 RX ORDER — CITALOPRAM HYDROBROMIDE 20 MG/1
TABLET ORAL
Qty: 30 TABLET | Refills: 0
Start: 2018-03-13 | End: 2018-04-13

## 2018-03-13 ASSESSMENT — ANXIETY QUESTIONNAIRES: GAD7 TOTAL SCORE: 16

## 2018-03-13 ASSESSMENT — PATIENT HEALTH QUESTIONNAIRE - PHQ9: SUM OF ALL RESPONSES TO PHQ QUESTIONS 1-9: 18

## 2018-03-13 NOTE — TELEPHONE ENCOUNTER
Called and left message for patient regarding to new Rx at pharmacy for 20 mg tablets of the Citalopram with a sig take 1/2 tab daily and increase to on full tablet.  Advised patient when more refills are needed, please call the clinic.

## 2018-03-13 NOTE — TELEPHONE ENCOUNTER
Received notification that Citalopram 10 mg tablets require PA. Rejection from pharmacy likely due to SIG which outlines transitioning to 2 tablets daily. In this scenario, insurance likely would prefer use of 20 mg tablets. Pharmacy technician agrees.     Changed Rx as follows per verbal order:    Citalopram 20 mg tablet: take 1/2 tablet a day then increase to one table daily  #30 with 0 refills    Please call patient and inform of change in tablet strength / directions.

## 2018-03-20 ENCOUNTER — OFFICE VISIT (OUTPATIENT)
Dept: FAMILY MEDICINE | Facility: CLINIC | Age: 33
End: 2018-03-20
Payer: COMMERCIAL

## 2018-03-20 VITALS
TEMPERATURE: 99 F | OXYGEN SATURATION: 98 % | SYSTOLIC BLOOD PRESSURE: 130 MMHG | HEIGHT: 62 IN | HEART RATE: 93 BPM | BODY MASS INDEX: 28.89 KG/M2 | DIASTOLIC BLOOD PRESSURE: 83 MMHG | WEIGHT: 157 LBS

## 2018-03-20 DIAGNOSIS — R45.851 PASSIVE SUICIDAL IDEATIONS: ICD-10-CM

## 2018-03-20 DIAGNOSIS — F43.22 ADJUSTMENT DISORDER WITH ANXIOUS MOOD: ICD-10-CM

## 2018-03-20 DIAGNOSIS — D68.62 LUPUS ANTICOAGULANT SYNDROME (H): ICD-10-CM

## 2018-03-20 DIAGNOSIS — F41.1 GAD (GENERALIZED ANXIETY DISORDER): Primary | ICD-10-CM

## 2018-03-20 RX ORDER — CLONAZEPAM 1 MG/1
TABLET ORAL
Qty: 45 TABLET | Refills: 0 | Status: SHIPPED | OUTPATIENT
Start: 2018-03-20 | End: 2018-05-18

## 2018-03-20 RX ORDER — WARFARIN SODIUM 5 MG/1
5 TABLET ORAL DAILY
Qty: 30 TABLET | Refills: 0 | Status: SHIPPED | OUTPATIENT
Start: 2018-03-20 | End: 2018-05-23 | Stop reason: ALTCHOICE

## 2018-03-20 ASSESSMENT — ANXIETY QUESTIONNAIRES
1. FEELING NERVOUS, ANXIOUS, OR ON EDGE: NEARLY EVERY DAY
GAD7 TOTAL SCORE: 20
IF YOU CHECKED OFF ANY PROBLEMS ON THIS QUESTIONNAIRE, HOW DIFFICULT HAVE THESE PROBLEMS MADE IT FOR YOU TO DO YOUR WORK, TAKE CARE OF THINGS AT HOME, OR GET ALONG WITH OTHER PEOPLE: VERY DIFFICULT
3. WORRYING TOO MUCH ABOUT DIFFERENT THINGS: NEARLY EVERY DAY
2. NOT BEING ABLE TO STOP OR CONTROL WORRYING: NEARLY EVERY DAY
5. BEING SO RESTLESS THAT IT IS HARD TO SIT STILL: NEARLY EVERY DAY
7. FEELING AFRAID AS IF SOMETHING AWFUL MIGHT HAPPEN: MORE THAN HALF THE DAYS
6. BECOMING EASILY ANNOYED OR IRRITABLE: NEARLY EVERY DAY

## 2018-03-20 ASSESSMENT — PATIENT HEALTH QUESTIONNAIRE - PHQ9: 5. POOR APPETITE OR OVEREATING: NEARLY EVERY DAY

## 2018-03-20 NOTE — PROGRESS NOTES
Primary Care Behavioral Health Consult Note    Meeting lasted: 20 minutes  Others present: child    Identifying Information and Presenting Problem:    Dr. Starr requested behavioral health consultation for this patient regarding planning, trouble shooting in the context of patient anxiety, strained family/couple dynamics, and sleep deprivation.  The patient is a 32 year old American individual that agreed to be seen by behavioral health today.    Topics Discussed/Interventions Provided:       I have seen patient in the past and today is looking for ideas of how to manage concerns at home. She has a history of anxiety disorder and PTSD. She struggles with sleep deprivation, low ability to concentrate, focus, remember, feeling angry and and having anger outbursts. Patient has 3 y/o son who sleeps with her and wakes her in the middle of the night. She and her fiance recently bought a house and have a strained relationship. She spends all her time with her young son, which is draining. She has inconsistent support and jolly feels that he should not need to help since he works. Upon further exploration into family of origin dynamics, it appears that her fijose's own father was not involved much in his life nor supportive of his mother. We discussed various options including: ECFE classes, [employer] (as ways for her to spend time by herself), family/couple therapy.       Assessment:     Mental Status: Lisandra appeared generally alert and oriented. Dress was casual and appropriate to the weather and occasion. Grooming and hygiene were clean. Eye contact was good. Speech was of normal volume and rate and was clear, coherent, and relevant. Mood was anxious, sad with congruent affect. Thought processes were relevant, logical and goal-directed. Thought content was WNL with no evidence of psychotic or paranoid features. No evidence of SI/HI or self-harm, intent, or plans. Memory appeared grossly intact. Insight and judgment  appeared fair and patient exhibited good impulse control during the appointment.     PHQ-9 SCORE 8/29/2017 10/2/2017 3/12/2018   Total Score 11 10 18       [place]-7 SCORE 8/15/2017 8/29/2017 3/12/2018   Total Score 21 19 16       Diagnostic Considerations:      A complete diagnostic assessment was not performed at today's visit.     Plan:      Dr. Starr will put in referral for psychiatry and psychotherapy. I will speak with one of our care coordinators about [employer] options and have the coordinator contact the patient about this.

## 2018-03-20 NOTE — MR AVS SNAPSHOT
After Visit Summary   3/20/2018    Lisandra Arauz    MRN: [id number]           Patient Information     Date Of Birth          1985        Visit Information        Provider Department      3/20/2018 2:00 PM Flaquita Starr MD [facility]        Today's Diagnoses     ARGENTINA (generalized anxiety disorder)    -  1    Adjustment disorder with anxious mood        Lupus anticoagulant syndrome (H)        Passive suicidal ideations           Follow-ups after your visit        Additional Services     MENTAL HEALTH REFERRAL  -       Use this form for behavioral health consults and assessments. The referral coordinator will help to determine whether patients are best served by clinic behavioral health staff or by community providers.    Type of referral(s) requested (indicate all that apply):  Adult Psychiatry--for diagnosis and medication management    Reason for referral: anxiety, disordered sleep depression    Currently receiving mental health services (if 'Yes', what services and why today's referral?): No  Currently having suicidal thoughts: No active plan, but is having thoughts that death is the only answer to her problems.  Previous psych hospitalization: Unknown    Please provide data for below screening tools if available.   PHQ-9 Score: 20  GAD7 Score: 21  PC-PTSD Score:  Bipolar Screen:  Kathleen (ADHD):   MCHAT (Autism Screen):   Pediatric Symptom Checklist (PSC):      needed: No  Language: English                  Who to contact     Please call your clinic at [phone] to:    Ask questions about your health    Make or cancel appointments    Discuss your medicines    Learn about your test results    Speak to your doctor            Additional Information About Your Visit        Care EveryWhere ID     This is your Care EveryWhere ID. This could be used by other organizations to access your [facility] medical records  [id number]        Your Vitals Were     Pulse  "Temperature Height Last Period Pulse Oximetry BMI (Body Mass Index)    93 99  F (37.2  C) (Oral) 5' 1.5\" (156.2 cm) 02/22/2018 98% 29.18 kg/m2       Blood Pressure from Last 3 Encounters:   03/20/18 130/83   03/12/18 146/88   10/02/17 130/85    Weight from Last 3 Encounters:   03/20/18 157 lb (71.2 kg)   03/12/18 156 lb (70.8 kg)   10/02/17 157 lb 12.8 oz (71.6 kg)              We Performed the Following     MENTAL HEALTH REFERRAL  -          Today's Medication Changes          These changes are accurate as of 3/20/18  2:55 PM.  If you have any questions, ask your nurse or doctor.               These medicines have changed or have updated prescriptions.        Dose/Directions    clonazePAM 1 MG tablet   Commonly known as:  klonoPIN   This may have changed:    - medication strength  - how much to take  - how to take this  - when to take this  - reasons to take this  - additional instructions   Used for:  Adjustment disorder with anxious mood   Changed by:  Flaquita Starr MD        Take one tablet every morning and two tablets at night one hour before bed   Quantity:  45 tablet   Refills:  0         Stop taking these medicines if you haven't already. Please contact your care team if you have questions.     DULoxetine 30 MG EC capsule   Commonly known as:  CYMBALTA   Stopped by:  Flaquita Starr MD                Where to get your medicines      These medications were sent to [facility] [address] [place] [address] AT [place] [address], [facility] [address]     Phone:  [phone]     warfarin 5 MG tablet         Some of these will need a paper prescription and others can be bought over the counter.  Ask your nurse if you have questions.     Bring a paper prescription for each of these medications     clonazePAM 1 MG tablet                Primary Care Provider Office Phone # Fax #    Flaquita Starr [phone]       [facility] [address] [place] [address]        Equal Access to Services     GUZMANSHA MAHENDRA : Hadii bridgett ku hadkatinao Sotamikaali, waaxda luqadaha, qaybta kaalmada carissa, janis lunaflipgregory whitmore. So [facility] [phone].    ATENCIÓN: Si habla español, tiene a rivas disposición servicios gratuitos de asistencia lingüística. Llame al [phone].    We comply with applicable federal civil rights laws and [place] laws. We do not discriminate on the basis of race, color, national origin, age, disability, sex, sexual orientation, or gender identity.            Thank you!     Thank you for choosing [facility]  for your care. Our goal is always to provide you with excellent care. Hearing back from our patients is one way we can continue to improve our services. Please take a few minutes to complete the written survey that you may receive in the mail after your visit with us. Thank you!             Your Updated Medication List - Protect others around you: Learn how to safely use, store and throw away your medicines at www.disposemymeds.org.          This list is accurate as of 3/20/18  2:55 PM.  Always use your most recent med list.                   Brand Name Dispense Instructions for use Diagnosis    citalopram 20 MG tablet    celeXA    30 tablet    Take one-half tablet a day then increase to one tablet daily    Adjustment disorder with anxious mood       clonazePAM 1 MG tablet    klonoPIN    45 tablet    Take one tablet every morning and two tablets at night one hour before bed    Adjustment disorder with anxious mood       * warfarin 1 MG tablet    COUMADIN    30 tablet    Take 1 tablet (1 mg) by mouth daily Add to 5 mg on Mondays and Fridays only    Lupus anticoagulant disorder (H)       * warfarin 5 MG tablet    COUMADIN    90 tablet    Take 1 tablet (5 mg) by mouth daily    Lupus anticoagulant syndrome (H)       * warfarin 5 MG tablet    COUMADIN    30 tablet    Take 1 tablet (5 mg)  by mouth daily    Lupus anticoagulant syndrome (H)       * Notice:  This list has 3 medication(s) that are the same as other medications prescribed for you. Read the directions carefully, and ask your doctor or other care provider to review them with you.

## 2018-03-20 NOTE — PROGRESS NOTES
"       HPI:       Lisandra Arauz is a 32 year old  female who presents to address continued issues with signficant sleep disturbance, anxiety, passive suicidal ideation the following concerns:    Aggravated, annoyed, not sleeping, anxiety is \"off the wall\".  Feels like she doesn't know what to do about how she feels.  Feels like she is losing her feels toward things. Anxiety leads to anger and frustration    Took some of her mom's Xanax when she has been unable to sleep and this has been the only thing that has helped her sleep.  Then she got sick after her son got sick followed by her fiance getting sick.     Just started her citalopram.  Not taking the klonopin reports that the klonopin was not working and that she threw it away.  Reports that her first priority is something for sleep        Rx hx:  Trazadone.  Has taken during the day.  Felt edgy and shaky.  Feels like her legs have to keep moving.  Feels worse.   Has taken Gabapentin in the past.  Took once and felt like her heart was going to explode.    Has also taken Vistaril.  Did not like effects and said that she would not use again.       Home:  Feels like she isn't getting enough help.   Hates feeling like she can't invest herself as a mother  Feels guilty when she has help from others or when  from her son.           PMHX:     Patient Active Problem List   Diagnosis     Lupus anticoagulant syndrome (H)     Intractable chronic migraine without aura     Gastroesophageal reflux disease     Chronic pain     Dysfunctional uterine bleeding     Generalized anxiety disorder     Obesity (BMI 30.0-34.9)     Restless legs syndrome     Cervical intraepithelial neoplasia grade III with severe dysplasia     Tobacco use disorder     Pap smear for cervical cancer screening     PTSD (post-traumatic stress disorder)       Current Outpatient Prescriptions   Medication Sig Dispense Refill     citalopram (CELEXA) 20 MG tablet Take one-half tablet a day then increase " "to one tablet daily 30 tablet 0     warfarin (COUMADIN) 1 MG tablet Take 1 tablet (1 mg) by mouth daily Add to 5 mg on Mondays and Fridays only 30 tablet 0     clonazePAM (KLONOPIN) 0.5 MG tablet Take 1 tablet (0.5 mg) by mouth 2 times daily as needed for anxiety (Patient not taking: Reported on 3/20/2018) 60 tablet 0     warfarin (COUMADIN) 5 MG tablet Take 1 tablet (5 mg) by mouth daily 90 tablet 1     warfarin (COUMADIN) 5 MG tablet Take 1 tablet (5 mg) by mouth daily 30 tablet 0          Allergies   Allergen Reactions     Hydrocodone-Acetaminophen Anaphylaxis     Phenothiazines      Other reaction(s): Wheezing     Prochlorperazine Anaphylaxis, Other (See Comments) and Swelling     Gabapentin Other (See Comments)     Nausea and vomiting, anxiety     Oxycodone-Acetaminophen Other (See Comments)     itching     Tramadol Other (See Comments)     Mood swings, anger.        No results found for this or any previous visit (from the past 24 hour(s)).    Current Outpatient Prescriptions   Medication     citalopram (CELEXA) 20 MG tablet     warfarin (COUMADIN) 1 MG tablet     clonazePAM (KLONOPIN) 0.5 MG tablet     warfarin (COUMADIN) 5 MG tablet     warfarin (COUMADIN) 5 MG tablet     No current facility-administered medications for this visit.               Review of Systems:   ROS as described above.  Denies F/S/C/N/V/SOB/CP          Physical Exam:     Vitals:    03/20/18 1400   BP: 130/83   Pulse: 93   Temp: 99  F (37.2  C)   TempSrc: Oral   SpO2: 98%   Weight: 157 lb (71.2 kg)   Height: 5' 1.5\" (156.2 cm)     Body mass index is 29.18 kg/(m^2).      GEN: patient sitting comfortably in NAD  HEEN: Head is atraumatic, normocephalic, eyes anicteric, mucous membranes moist  CV: RRR w/o M/R/G  PULM: CTAB without w/r/r  ABD: soft, nontender, bowel sounds present  NEURO: Alert and oriented x3.  No focal motor abnormalities.  Face symmetric.  PSYCH: appropriate  SKIN: No rashes, bruising, or other lesions      Assessment and " Plan     1. Adjustment disorder with anxious mood-uncontrolled sx with 0.5mg klonopin, several negative reactions to other non-benzo medications.  Is willing to try higher dose klonopin.  Does not drink alcohol.  Occasional marajuana.  COntinue citalopram  - clonazePAM (KLONOPIN) 1 MG tablet; Take one tablet every morning and two tablets at night one hour before bed  Dispense: 45 tablet; Refill: 0    2. Lupus anticoagulant syndrome (H)-taking 1mg tabs, up to 3 a day.  Does not want INR checked toda  - warfarin (COUMADIN) 5 MG tablet; Take 1 tablet (5 mg) by mouth daily  Dispense: 30 tablet; Refill: 0    3. ARGENTINA (generalized anxiety disorder)-generally overwhelmed.  Issues with home stress, parenting stress.  Significant issues with sleep.    - MENTAL HEALTH REFERRAL  -    4. Passive suicidal ideation:  -patietn in need of greater wrap around services.    -will see Mikayla today  -open to psychotherapy  -open to help with childcare  -open to family therapy      Options for treatment and follow-up care were reviewed with the patient and/or guardian. Lisandra Arauz and/or guardian engaged in the decision making process and verbalized understanding of the options discussed and agreed with the final plan.    Flaquita Starr MD

## 2018-03-22 ASSESSMENT — PATIENT HEALTH QUESTIONNAIRE - PHQ9: SUM OF ALL RESPONSES TO PHQ QUESTIONS 1-9: 20

## 2018-03-22 ASSESSMENT — ANXIETY QUESTIONNAIRES: GAD7 TOTAL SCORE: 20

## 2018-03-28 ENCOUNTER — DOCUMENTATION ONLY (OUTPATIENT)
Dept: FAMILY MEDICINE | Facility: CLINIC | Age: 33
End: 2018-03-28

## 2018-03-28 DIAGNOSIS — D68.62 LUPUS ANTICOAGULANT DISORDER (H): ICD-10-CM

## 2018-03-28 RX ORDER — WARFARIN SODIUM 1 MG/1
1 TABLET ORAL DAILY
Qty: 90 TABLET | Refills: 1 | Status: SHIPPED | OUTPATIENT
Start: 2018-03-28 | End: 2018-05-23 | Stop reason: ALTCHOICE

## 2018-03-28 NOTE — PROGRESS NOTES
Procedure Requested        9035     MENTAL HEALTH REFERRAL  -             [#[id number]]         Priority: Routine  Class: External referral         Comment:Use this form for behavioral health consults and assessments. The                  referral coordinator will help to determine whether patients are                  best served by clinic behavioral health staff or by community                  providers.                                    Type of referral(s) requested (indicate all that apply):                  Adult Psychiatry--for diagnosis and medication management                                    Reason for referral: anxiety, disordered sleep depression                                    Currently receiving mental health services (if 'Yes', what                   services and why today's referral?): No                  Currently having suicidal thoughts: No active plan, but is having                   thoughts that death is the only answer to her problems.                  Previous psych hospitalization: Unknown                                    Please provide data for below screening tools if available.                   PHQ-9 Score: 20                  GAD7 Score: 21                  PC-PTSD Score:                  Bipolar Screen:                  Kathleen (ADHD):                   MCHAT (Autism Screen):                   Pediatric Symptom Checklist (PSC):                                      needed: No                  Language: English       Associated Diagnoses         F41.1 ARGENTINA (generalized anxiety disorder)         Adult or Child/Adolescent:  Adult               Location:  [facility]             [id number]               : 1985  F      [address]                                 PCP: [id number]     [facility] [address]                              CTR: [facility]

## 2018-03-28 NOTE — PROGRESS NOTES
Referral for (Test): Psychiatry   Location/Place/Provider: [place], [place] [address]   Date/Time:    Phone:  [phone]  Fax:   Additional information/prep.: I called to register the pt, the pt will call and setup the appointment.    Scheduled by: NERI Merino

## 2018-04-13 DIAGNOSIS — F43.22 ADJUSTMENT DISORDER WITH ANXIOUS MOOD: ICD-10-CM

## 2018-04-13 RX ORDER — CITALOPRAM HYDROBROMIDE 20 MG/1
TABLET ORAL
Qty: 90 TABLET | Refills: 1 | Status: SHIPPED | OUTPATIENT
Start: 2018-04-13 | End: 2018-05-23

## 2018-04-20 ENCOUNTER — TELEPHONE (OUTPATIENT)
Dept: FAMILY MEDICINE | Facility: CLINIC | Age: 33
End: 2018-04-20
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
## 2018-05-18 ENCOUNTER — OFFICE VISIT (OUTPATIENT)
Dept: PHARMACY | Facility: CLINIC | Age: 33
End: 2018-05-18
Payer: COMMERCIAL

## 2018-05-18 ENCOUNTER — OFFICE VISIT (OUTPATIENT)
Dept: FAMILY MEDICINE | Facility: CLINIC | Age: 33
End: 2018-05-18
Payer: COMMERCIAL

## 2018-05-18 VITALS
BODY MASS INDEX: 27.56 KG/M2 | DIASTOLIC BLOOD PRESSURE: 99 MMHG | HEART RATE: 84 BPM | OXYGEN SATURATION: 99 % | HEIGHT: 61 IN | SYSTOLIC BLOOD PRESSURE: 136 MMHG | TEMPERATURE: 99.5 F | RESPIRATION RATE: 20 BRPM | WEIGHT: 146 LBS

## 2018-05-18 DIAGNOSIS — Z13.9 SCREENING FOR CONDITION: ICD-10-CM

## 2018-05-18 DIAGNOSIS — D68.62 LUPUS ANTICOAGULANT SYNDROME (H): Primary | ICD-10-CM

## 2018-05-18 DIAGNOSIS — F43.22 ADJUSTMENT DISORDER WITH ANXIOUS MOOD: ICD-10-CM

## 2018-05-18 DIAGNOSIS — I82.90 VTE (VENOUS THROMBOEMBOLISM): ICD-10-CM

## 2018-05-18 DIAGNOSIS — G89.4 CHRONIC PAIN SYNDROME: ICD-10-CM

## 2018-05-18 LAB
HIV 1+2 AB+HIV1 P24 AG SERPL QL IA: NEGATIVE
INR PPP: 1.2

## 2018-05-18 RX ORDER — CLONAZEPAM 1 MG/1
TABLET ORAL
Qty: 45 TABLET | Refills: 0 | Status: SHIPPED | OUTPATIENT
Start: 2018-05-18 | End: 2018-05-18

## 2018-05-18 RX ORDER — CLONAZEPAM 1 MG/1
TABLET ORAL
Qty: 45 TABLET | Refills: 0 | Status: SHIPPED | OUTPATIENT
Start: 2018-05-18 | End: 2018-05-25

## 2018-05-18 RX ORDER — PAROXETINE 10 MG/1
10 TABLET, FILM COATED ORAL AT BEDTIME
Qty: 120 TABLET | Refills: 1 | Status: SHIPPED | OUTPATIENT
Start: 2018-05-18 | End: 2018-06-19

## 2018-05-18 RX ORDER — WARFARIN SODIUM 5 MG/1
5 TABLET ORAL DAILY
Qty: 90 TABLET | Refills: 1 | Status: SHIPPED | OUTPATIENT
Start: 2018-05-18 | End: 2018-05-23 | Stop reason: ALTCHOICE

## 2018-05-18 NOTE — LETTER
May 25, 2018      Lisandra Arauz  [address]  [facility] [address]        Dear Lisandra,    Please see below for your test results.    Resulted Orders   INR ([facility])   Result Value Ref Range    INR 1.2       Comment:      Adult Normal Range: 0.90 - 1.10  Therapeutic Range: 2.0 - 3.5     HIV Ag/Ab Screen [place] ([facility])   Result Value Ref Range    HIV Antigen/Antibody Negative Negative    Narrative    Test performed by:  [facility] [address] [place] [address]  Method is Abbott HIV Ag/Ab for the detection of HIV p24 antigen, HIV-1   antibodies and HIV-2 antibodies.       If you have any questions, please call the clinic to make an appointment.    Sincerely,    Flaquita Starr MD
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
## 2018-05-18 NOTE — MR AVS SNAPSHOT
After Visit Summary   5/18/2018    Lisandra Arauz    MRN: [id number]           Patient Information     Date Of Birth          1985        Visit Information        Provider Department      5/18/2018 3:40 PM Nilam Baez RPH [facility]        Today's Diagnoses     Lupus anticoagulant syndrome (H)    -  1       Follow-ups after your visit        Your next 10 appointments already scheduled     Jun 19, 2018  2:20 PM CDT   Return Visit with Flaquita Starr MD   [facility] ([facility])    [address] [place] [address]   [phone]              Who to contact     Please call your clinic at [phone] to:    Ask questions about your health    Make or cancel appointments    Discuss your medicines    Learn about your test results    Speak to your doctor            Additional Information About Your Visit        Care EveryWhere ID     This is your Care EveryWhere ID. This could be used by other organizations to access your [place] medical records  [id number]         Blood Pressure from Last 3 Encounters:   05/18/18 (!) 136/99   03/20/18 130/83   03/12/18 146/88    Weight from Last 3 Encounters:   05/18/18 146 lb (66.2 kg)   03/20/18 157 lb (71.2 kg)   03/12/18 156 lb (70.8 kg)              We Performed the Following     MTM, EA ADDITIONAL 15 MIN (23770) x 1 (= 15 min.)          Today's Medication Changes          These changes are accurate as of 5/18/18 11:59 PM.  If you have any questions, ask your nurse or doctor.               Start taking these medicines.        Dose/Directions    clonazePAM 1 MG tablet   Commonly known as:  klonoPIN   Used for:  Adjustment disorder with anxious mood   Started by:  Flaquita Starr MD        Take one tablet every morning and two tablets at night one hour before bed   Quantity:  45 tablet   Refills:  0       PARoxetine 10 MG tablet   Commonly known as:  PAXIL   Used for:  Adjustment disorder with  anxious mood   Started by:  Flaquita Starr MD        Dose:  10 mg   Take 1 tablet (10 mg) by mouth At Bedtime For one week then increase to two tablets at bedtime   Quantity:  120 tablet   Refills:  1       rivaroxaban ANTICOAGULANT 20 MG Tabs tablet   Commonly known as:  XARELTO   Used for:  Lupus anticoagulant syndrome (H), VTE (venous thromboembolism)   Started by:  Flaquita Starr MD        Dose:  20 mg   Take 1 tablet (20 mg) by mouth daily (with dinner)   Quantity:  30 tablet   Refills:  3            Where to get your medicines      These medications were sent to [id number] - [place], [place] - [address] AT [facility] [address]  [address], [place] [address]     Phone:  [phone]     PARoxetine 10 MG tablet    rivaroxaban ANTICOAGULANT 20 MG Tabs tablet    warfarin 5 MG tablet         Some of these will need a paper prescription and others can be bought over the counter.  Ask your nurse if you have questions.     Bring a paper prescription for each of these medications     clonazePAM 1 MG tablet                Primary Care Provider Office Phone # Fax #    Flaquita Starr [phone]       [facility] [address] [place] [address]        Equal Access to Services     ELSIE MCCRAY AH: Hadii aad ku hadasho Soomaali, waaxda luqadaha, qaybta kaalmada adeegyada, waxay idiin hayaan eugenia kharagregory laelie whitmore. So [facility] [phone].    ATENCIÓN: Si habla español, tiene a rivas disposición servicios gratfloresitaos de asistencia lingüística. ame al [phone].    We comply with applicable federal civil rights laws and [place] laws. We do not discriminate on the basis of race, color, national origin, age, disability, sex, sexual orientation, or gender identity.            Thank you!     Thank you for choosing [facility]  for your care. Our goal is always to provide you with excellent care. Hearing back from our patients  is one way we can continue to improve our services. Please take a few minutes to complete the written survey that you may receive in the mail after your visit with us. Thank you!             Your Updated Medication List - Protect others around you: Learn how to safely use, store and throw away your medicines at www.disposemymeds.org.          This list is accurate as of 5/18/18 11:59 PM.  Always use your most recent med list.                   Brand Name Dispense Instructions for use Diagnosis    clonazePAM 1 MG tablet    klonoPIN    45 tablet    Take one tablet every morning and two tablets at night one hour before bed    Adjustment disorder with anxious mood       PARoxetine 10 MG tablet    PAXIL    120 tablet    Take 1 tablet (10 mg) by mouth At Bedtime For one week then increase to two tablets at bedtime    Adjustment disorder with anxious mood       rivaroxaban ANTICOAGULANT 20 MG Tabs tablet    XARELTO    30 tablet    Take 1 tablet (20 mg) by mouth daily (with dinner)    Lupus anticoagulant syndrome (H), VTE (venous thromboembolism)       * warfarin 5 MG tablet    COUMADIN    30 tablet    Take 1 tablet (5 mg) by mouth daily    Lupus anticoagulant syndrome (H)       * warfarin 1 MG tablet    COUMADIN    90 tablet    Take 1 tablet (1 mg) by mouth daily Add to 5 mg on Mondays and Fridays only    Lupus anticoagulant disorder (H)       * warfarin 5 MG tablet    COUMADIN    90 tablet    Take 1 tablet (5 mg) by mouth daily    Lupus anticoagulant syndrome (H)       * Notice:  This list has 3 medication(s) that are the same as other medications prescribed for you. Read the directions carefully, and ask your doctor or other care provider to review them with you.

## 2018-05-18 NOTE — MR AVS SNAPSHOT
After Visit Summary   5/18/2018    Lisandra Arauz    MRN: [id number]           Patient Information     Date Of Birth          1985        Visit Information        Provider Department      5/18/2018 3:20 PM Flaquita Starr MD [facility]        Today's Diagnoses     Lupus anticoagulant syndrome (H)    -  1    Screening for condition        Adjustment disorder with anxious mood        Chronic pain syndrome           Follow-ups after your visit        Additional Services     MENTAL HEALTH REFERRAL  -       Use this form for behavioral health consults and assessments. The referral coordinator will help to determine whether patients are best served by clinic behavioral health staff or by community providers.  Reason for request:  -needs psychiatrist   -needs psychotherapist    Failed citalopram  Restarting Paxil            PHYSICAL THERAPY REFERRAL       PT/OT REFERRAL  Lisandra Arauz  1985  Phone #: [phone] (home)    needed: No  Language: English    PT/OT  Facility: [facility]  Chronic pain program    History: hx PTSD. Total body pain.     Precautions/Contraindications: None    Imaging Studies: none recently  Surgical Procedure/Test Results: None    Treatment Goals:   Other: goal is to learn to cope with pain, not become overwhelmed by pain. Pain is diffuse/non-specific    Prognosis: fair    Visits: Up to 12    Evaluate: Evaluate and treat    Plan: per PT                  Your next 10 appointments already scheduled     Jun 19, 2018  2:20 PM CDT   Return Visit with Flaquita Starr MD   [facility] ([facility])    [address] [place] [address]   [phone]              Future tests that were ordered for you today     Open Future Orders        Priority Expected Expires Ordered    PHYSICAL THERAPY REFERRAL Routine  6/18/2018 5/18/2018            Who to contact     Please call your clinic at [phone] to:    Ask questions about  "your health    Make or cancel appointments    Discuss your medicines    Learn about your test results    Speak to your doctor            Additional Information About Your Visit        Care EveryWhere ID     This is your Care EveryWhere ID. This could be used by other organizations to access your [place] medical records  [id number]        Your Vitals Were     Pulse Temperature Respirations Height Pulse Oximetry BMI (Body Mass Index)    84 99.5  F (37.5  C) (Oral) 20 5' 1.1\" (155.2 cm) 99% 27.49 kg/m2       Blood Pressure from Last 3 Encounters:   05/18/18 (!) 136/99   03/20/18 130/83   03/12/18 146/88    Weight from Last 3 Encounters:   05/18/18 146 lb (66.2 kg)   03/20/18 157 lb (71.2 kg)   03/12/18 156 lb (70.8 kg)              We Performed the Following     [facility] Ag[facility] Screen [facility]     INR (P )     MENTAL HEALTH REFERRAL  -          Today's Medication Changes          These changes are accurate as of 5/18/18  4:13 PM.  If you have any questions, ask your nurse or doctor.               Start taking these medicines.        Dose/Directions    clonazePAM 1 MG tablet   Commonly known as:  klonoPIN   Used for:  Adjustment disorder with anxious mood   Started by:  Flaquita Starr MD        Take one tablet every morning and two tablets at night one hour before bed   Quantity:  45 tablet   Refills:  0       PARoxetine 10 MG tablet   Commonly known as:  PAXIL   Used for:  Adjustment disorder with anxious mood   Started by:  Flaquita Starr MD        Dose:  10 mg   Take 1 tablet (10 mg) by mouth At Bedtime For one week then increase to two tablets at bedtime   Quantity:  120 tablet   Refills:  1            Where to get your medicines      These medications were sent to [id number] - [place] [facility] [place] [address] [facility] [address] AT [place] [address]  [address] [facility] [address], [place] [facility] [address]     Phone:  [phone]     PARoxetine 10 MG tablet    warfarin 5 MG tablet       "   Some of these will need a paper prescription and others can be bought over the counter.  Ask your nurse if you have questions.     Bring a paper prescription for each of these medications     clonazePAM 1 MG tablet                Primary Care Provider Office Phone # Fax #    Flaquita Starr -[phone]       [facility] [address] [place] [address]        Equal Access to Services     [facility] Center[facility]ANIE : Hadii aad ku hadasho Soomaali, waaxda luqadaha, qaybta kaalmada adeegyada, waxay idiin hayaan adeeg kharash la'aan ah. So [facility] [phone].    ATENCIÓN: Si habla español, tiene a rivas disposición servicios gratuitos de asistencia lingüística. Polly al [phone].    We comply with applicable federal civil rights laws and [place] laws. We do not discriminate on the basis of race, color, national origin, age, disability, sex, sexual orientation, or gender identity.            Thank you!     Thank you for choosing [facility]  for your care. Our goal is always to provide you with excellent care. Hearing back from our patients is one way we can continue to improve our services. Please take a few minutes to complete the written survey that you may receive in the mail after your visit with us. Thank you!             Your Updated Medication List - Protect others around you: Learn how to safely use, store and throw away your medicines at www.disposemymeds.org.          This list is accurate as of 5/18/18  4:13 PM.  Always use your most recent med list.                   Brand Name Dispense Instructions for use Diagnosis    citalopram 20 MG tablet    celeXA    90 tablet    Take one-half tablet a day then increase to one tablet daily    Adjustment disorder with anxious mood       clonazePAM 1 MG tablet    klonoPIN    45 tablet    Take one tablet every morning and two tablets at night one hour before bed    Adjustment disorder with anxious mood       PARoxetine 10 MG tablet     PAXIL    120 tablet    Take 1 tablet (10 mg) by mouth At Bedtime For one week then increase to two tablets at bedtime    Adjustment disorder with anxious mood       * warfarin 5 MG tablet    COUMADIN    30 tablet    Take 1 tablet (5 mg) by mouth daily    Lupus anticoagulant syndrome (H)       * warfarin 1 MG tablet    COUMADIN    90 tablet    Take 1 tablet (1 mg) by mouth daily Add to 5 mg on Mondays and Fridays only    Lupus anticoagulant disorder (H)       * warfarin 5 MG tablet    COUMADIN    90 tablet    Take 1 tablet (5 mg) by mouth daily    Lupus anticoagulant syndrome (H)       * Notice:  This list has 3 medication(s) that are the same as other medications prescribed for you. Read the directions carefully, and ask your doctor or other care provider to review them with you.

## 2018-05-18 NOTE — PROGRESS NOTES
S: Per Dr. Starr, met with patient to discuss alternative anticoagulants to Warfarin. Patient reports long history of lability in INR, rarely has been therapeutic since Warfarin initiation 2010.      O:     Patient Active Problem List   Diagnosis     Lupus anticoagulant syndrome (H)     Intractable chronic migraine without aura     Gastroesophageal reflux disease     Chronic pain     Dysfunctional uterine bleeding     Generalized anxiety disorder     Obesity (BMI 30.0-34.9)     Restless legs syndrome     Cervical intraepithelial neoplasia grade III with severe dysplasia     Tobacco use disorder     Pap smear for cervical cancer screening     PTSD (post-traumatic stress disorder)         A/P:   - Patient agreeable to try to change to Xarelto. RX sent today.  - Anticipate that will require prior authorization from insurance. Informed patient of this.   - If no PA needed, instructed patient to call clinic prior to initiating Xarelto. If PA needed, will coordinate initiation of Xarelto and discontinuation of Warfarin once approval is obtained.       Options for treatment and/or follow-up care were reviewed with the patient. Lisandra was engaged and actively involved in the decision making process. She verbalized understanding of the options discussed and was satisfied with the final plan. Patient was provided with written instructions/medication list via AVS.    Dr. Starr was provided our recommendations in clinic today and was available for supervision during this visit and is the authorizing prescriber for this visit through the pharmacist collaborative practice agreement.    Thank you for the opportunity to participate in the care of this patient.  Nilam Baez, Pharm.D.  [facility]: [phone]

## 2018-05-21 NOTE — PATIENT INSTRUCTIONS
Referral for Physical Therapy faced to [facility] and [facility]  [phone]  [phone]  Clinic will contact patient to schedule appointment.

## 2018-05-22 ENCOUNTER — TELEPHONE (OUTPATIENT)
Dept: FAMILY MEDICINE | Facility: CLINIC | Age: 33
End: 2018-05-22

## 2018-05-22 ENCOUNTER — DOCUMENTATION ONLY (OUTPATIENT)
Dept: FAMILY MEDICINE | Facility: CLINIC | Age: 33
End: 2018-05-22

## 2018-05-22 NOTE — PROGRESS NOTES
S: Per Dr. Starr, met with patient to discuss alternative anticoagulants to Warfarin. Patient reports long history of lability in INR, rarely has been therapeutic since Warfarin initiation 2010.      O:     Patient Active Problem List   Diagnosis     Lupus anticoagulant syndrome (H)     Intractable chronic migraine without aura     Gastroesophageal reflux disease     Chronic pain     Dysfunctional uterine bleeding     Generalized anxiety disorder     Obesity (BMI 30.0-34.9)     Restless legs syndrome     Cervical intraepithelial neoplasia grade III with severe dysplasia     Tobacco use disorder     Pap smear for cervical cancer screening     PTSD (post-traumatic stress disorder)         A/P: Targeted anticoagulants like Xarelto recommended over Warfarin per current guidelines. Advantageous for patient given lack of ability to achieve consistently therapeutic INR. Unclear reason for this, but based on history available likely a result of difficulty with medication compliance.    - Reviewed characteristics, advantages and disadvantages of new medicines compared to Warfarin.   - Patient agreeable to try to change to Xarelto. RX sent today after discussion w/ Dr. Starr.  - Anticipate that will require prior authorization from insurance. Informed patient of this.   -- If no PA needed, instructed patient to call clinic prior to initiating Xarelto. Per package labeling, stop Warfarin and start Xarelto if INR <3.  -- If PA needed, will coordinate initiation of Xarelto and discontinuation of Warfarin once approval is obtained.       Options for treatment and/or follow-up care were reviewed with the patient. Lisandra was engaged and actively involved in the decision making process. She verbalized understanding of the options discussed and was satisfied with the final plan. Patient was provided with written instructions/medication list via AVS.    Dr. Starr was provided our recommendations in clinic today and was available for  supervision during this visit and is the authorizing prescriber for this visit through the pharmacist collaborative practice agreement.    Thank you for the opportunity to participate in the care of this patient.  Nilam Baez, Pharm.D.  [facility]: [phone]    Current Outpatient Prescriptions   Medication Sig Dispense Refill     citalopram (CELEXA) 20 MG tablet Take one-half tablet a day then increase to one tablet daily (Patient not taking: Reported on 5/18/2018) 90 tablet 1     clonazePAM (KLONOPIN) 1 MG tablet Take one tablet every morning and two tablets at night one hour before bed 45 tablet 0     PARoxetine (PAXIL) 10 MG tablet Take 1 tablet (10 mg) by mouth At Bedtime For one week then increase to two tablets at bedtime 120 tablet 1     rivaroxaban ANTICOAGULANT (XARELTO) 20 MG TABS tablet Take 1 tablet (20 mg) by mouth daily (with dinner) 30 tablet 3        Drug therapy problems identified:  Medical Condition 1: Anticoagulation, Goals of therapy: Not at goal, Drug Class: Anticoagulant - oral,  Efficacy: More effective/evidence based drug available, Intervention: Change drug, Verification: Patient Agreed - CPA      Relevant medical devices: n/a    # of medical conditions addressed: 1  # of medications addressed: 1  # of DTP identified: 1  Time spent: 20 minutes  Level of service per [place] DHS guidelines: 2

## 2018-05-22 NOTE — PROGRESS NOTES
Referral for (Test): Psychiatry   Location/Place/Provider:    Date/Time:    Phone:   Fax:   Additional information/prep.: I called the pt to see if she was able to setup this appointment with Jessica.  Pt stated that she has not schedule it yet, but will call to schedule it.  Pt said that once she schedule, she will call and let me know the times and dates.    Scheduled by: NERI Merino

## 2018-05-22 NOTE — PROGRESS NOTES
Procedure Requested        9035     MENTAL HEALTH REFERRAL  -             [#[id number]]         Priority: Routine  Class: External referral         Comment:Use this form for behavioral health consults and assessments. The                  referral coordinator will help to determine whether patients are                  best served by clinic behavioral health staff or by community                  providers.                  Reason for request:                  -needs psychiatrist                   -needs psychotherapist                                    Failed citalopram                  Restarting Paxil       Associated Diagnoses         F43.22 Adjustment disorder with anxious mood         Adult or Child/Adolescent:  Adult               Location:  [place]                     KEISHA LOVE             [id number]               : 1985  F      [address]                                 PCP: [id number]     [facility] [address]                              CTR: [facility]

## 2018-05-23 NOTE — TELEPHONE ENCOUNTER
Call to [facility] pharmacy. Xarelto covered at no charge. Picked up by patient. Discontinued remaining Rx for Warfarin.     Call to patient. Agreeable to lab visit Friday to check INR. If INR < 3, will transition to Xarelto per package labeling recommendations.     Routed to Dr. Starr and lab staff as JACOBY

## 2018-05-25 ENCOUNTER — DOCUMENTATION ONLY (OUTPATIENT)
Dept: FAMILY MEDICINE | Facility: CLINIC | Age: 33
End: 2018-05-25

## 2018-05-25 DIAGNOSIS — Z86.711 HISTORY OF PULMONARY EMBOLISM: Primary | ICD-10-CM

## 2018-05-25 LAB — INR PPP: 1.6
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
## 2018-06-12 ENCOUNTER — TRANSFERRED RECORDS (OUTPATIENT)
Dept: HEALTH INFORMATION MANAGEMENT | Facility: CLINIC | Age: 33
End: 2018-06-12

## 2018-06-19 ENCOUNTER — OFFICE VISIT (OUTPATIENT)
Dept: FAMILY MEDICINE | Facility: CLINIC | Age: 33
End: 2018-06-19
Payer: COMMERCIAL

## 2018-06-19 VITALS
DIASTOLIC BLOOD PRESSURE: 88 MMHG | BODY MASS INDEX: 26.43 KG/M2 | TEMPERATURE: 99.2 F | WEIGHT: 143.6 LBS | OXYGEN SATURATION: 97 % | HEIGHT: 62 IN | SYSTOLIC BLOOD PRESSURE: 136 MMHG | HEART RATE: 85 BPM

## 2018-06-19 DIAGNOSIS — F43.22 ADJUSTMENT DISORDER WITH ANXIOUS MOOD: ICD-10-CM

## 2018-06-19 RX ORDER — CLONAZEPAM 1 MG/1
TABLET ORAL
Qty: 90 TABLET | Refills: 0 | Status: SHIPPED | OUTPATIENT
Start: 2018-06-19 | End: 2018-08-29

## 2018-06-19 RX ORDER — PAROXETINE 20 MG/1
10 TABLET, FILM COATED ORAL AT BEDTIME
Qty: 30 TABLET | Refills: 1 | Status: SHIPPED | OUTPATIENT
Start: 2018-06-19 | End: 2018-08-23

## 2018-06-19 NOTE — MR AVS SNAPSHOT
"              After Visit Summary   6/19/2018    Lisandra Arauz    MRN: [id number]           Patient Information     Date Of Birth          1985        Visit Information        Provider Department      6/19/2018 2:20 PM Flaquita Starr MD [facility]        Today's Diagnoses     Adjustment disorder with anxious mood           Follow-ups after your visit        Who to contact     Please call your clinic at [phone] to:    Ask questions about your health    Make or cancel appointments    Discuss your medicines    Learn about your test results    Speak to your doctor            Additional Information About Your Visit        Care EveryWhere ID     This is your Care EveryWhere ID. This could be used by other organizations to access your [facility] medical records  [id number]        Your Vitals Were     Pulse Temperature Height Pulse Oximetry BMI (Body Mass Index)       85 99.2  F (37.3  C) (Oral) 5' 1.54\" (156.3 cm) 97% 26.66 kg/m2        Blood Pressure from Last 3 Encounters:   06/19/18 136/88   05/18/18 (!) 136/99   03/20/18 130/83    Weight from Last 3 Encounters:   06/19/18 143 lb 9.6 oz (65.1 kg)   05/18/18 146 lb (66.2 kg)   03/20/18 157 lb (71.2 kg)              Today, you had the following     No orders found for display         Today's Medication Changes          These changes are accurate as of 6/19/18 11:59 PM.  If you have any questions, ask your nurse or doctor.               These medicines have changed or have updated prescriptions.        Dose/Directions    PARoxetine 20 MG tablet   Commonly known as:  PAXIL   This may have changed:  medication strength   Used for:  Adjustment disorder with anxious mood        Dose:  10 mg   Take 0.5 tablets (10 mg) by mouth At Bedtime For one week then increase to two tablets at bedtime   Quantity:  30 tablet   Refills:  1            Where to get your medicines      These medications were sent to [facility] - [place], [place] - " [address] AT [facility] [address]  [address], [place] [address]     Phone:  [phone]     PARoxetine 20 MG tablet         Some of these will need a paper prescription and others can be bought over the counter.  Ask your nurse if you have questions.     Bring a paper prescription for each of these medications     clonazePAM 1 MG tablet                Primary Care Provider Office Phone # Fax #    Flaquita Starr [phone]       [facility] [address] [place] [address]        Equal Access to Services     [facility]: Hadii aad ku hadasho Soomaali, waaxda luqadaha, qaybta kaalmada adeegyada, waxagustina quigleyin hayaan adehaven brown . So [facility] [phone].    ATENCIÓN: Si habla español, tiene a rivas disposición servicios gratuitos de asistencia lingüística. [facility] [phone].    We comply with applicable federal civil rights laws and [place] laws. We do not discriminate on the basis of race, color, national origin, age, disability, sex, sexual orientation, or gender identity.            Thank you!     Thank you for choosing [facility]  for your care. Our goal is always to provide you with excellent care. Hearing back from our patients is one way we can continue to improve our services. Please take a few minutes to complete the written survey that you may receive in the mail after your visit with us. Thank you!             Your Updated Medication List - Protect others around you: Learn how to safely use, store and throw away your medicines at www.disposemymeds.org.          This list is accurate as of 6/19/18 11:59 PM.  Always use your most recent med list.                   Brand Name Dispense Instructions for use Diagnosis    butalbital-acetaminophen-caffeine -40 MG per tablet    FIORICET/ESGIC     Take 1 tablet by mouth 2 times daily as needed        clonazePAM 1 MG tablet    klonoPIN    90 tablet    Take one tablet every  morning and two tablets at night one hour before bed    Adjustment disorder with anxious mood       PARoxetine 20 MG tablet    PAXIL    30 tablet    Take 0.5 tablets (10 mg) by mouth At Bedtime For one week then increase to two tablets at bedtime    Adjustment disorder with anxious mood       rivaroxaban ANTICOAGULANT 20 MG Tabs tablet    XARELTO    30 tablet    Take 1 tablet (20 mg) by mouth daily (with dinner)    Lupus anticoagulant syndrome (H), VTE (venous thromboembolism)

## 2018-06-19 NOTE — PROGRESS NOTES
HPI:       Lisandra Arauz is a 33 year old  female who presents to address the following concerns:    Depression with anxiety and poor sleep:  Did go to first visit with Jessica.  Has follow up 6/21/ and is getting coordination for talk therapy.  Jessica did prescribe medication for nightmares but patient does not know what it is called.  She continues to have difficulty with sleep  Continues to feel tired and agitated.  Has not slept since the 17th  Saw N    History Lupus anticoagulant syndrome  Xarelto  -notices pulsation in feet when takes meds  -no throat swelling  -no trouble swallowing or edema    Chronic pain:  -describing pain and tension in the head and neck leading to HA  -referred to PT previous visit and I received letter that patient did not respond to their calls.  She reports that she has been having issues with her phone.   -would like help with arranging this appoitment    Saw Brenda Kowalski with Jessica and has next appointment with Jessica 6/21/18         PMHX:     Patient Active Problem List   Diagnosis     Lupus anticoagulant syndrome (H)     Intractable chronic migraine without aura     Gastroesophageal reflux disease     Chronic pain     Dysfunctional uterine bleeding     Generalized anxiety disorder     Obesity (BMI 30.0-34.9)     Restless legs syndrome     Cervical intraepithelial neoplasia grade III with severe dysplasia     Tobacco use disorder     Pap smear for cervical cancer screening     PTSD (post-traumatic stress disorder)       Current Outpatient Prescriptions   Medication Sig Dispense Refill     clonazePAM (KLONOPIN) 1 MG tablet Take one tablet every morning and two tablets at night one hour before bed 28 tablet 0     PARoxetine (PAXIL) 10 MG tablet Take 1 tablet (10 mg) by mouth At Bedtime For one week then increase to two tablets at bedtime 120 tablet 1     rivaroxaban ANTICOAGULANT (XARELTO) 20 MG TABS tablet Take 1 tablet (20 mg) by mouth daily (with dinner) 30 tablet 3  "         Allergies   Allergen Reactions     Hydrocodone-Acetaminophen Anaphylaxis     Phenothiazines      Other reaction(s): Wheezing     Prochlorperazine Anaphylaxis, Other (See Comments) and Swelling     Gabapentin Other (See Comments)     Nausea and vomiting, anxiety     Oxycodone-Acetaminophen Other (See Comments)     itching     Tramadol Other (See Comments)     Mood swings, anger.        No results found for this or any previous visit (from the past 24 hour(s)).    Current Outpatient Prescriptions   Medication     clonazePAM (KLONOPIN) 1 MG tablet     PARoxetine (PAXIL) 10 MG tablet     rivaroxaban ANTICOAGULANT (XARELTO) 20 MG TABS tablet     No current facility-administered medications for this visit.               Review of Systems:   ROS as described above.  Denies F/S/C/N/V/SOB/CP          Physical Exam:     Vitals:    06/19/18 1406   BP: 136/88   Pulse: 85   Temp: 99.2  F (37.3  C)   TempSrc: Oral   SpO2: 97%   Weight: 143 lb 9.6 oz (65.1 kg)   Height: 5' 1.54\" (156.3 cm)     Body mass index is 26.66 kg/(m^2).    GEN: patient sitting comfortably in NAD  HEEN: Head is atraumatic, normocephalic, eyes anicteric, mucous membranes moist  CV: RRR w/o M/R/G  PULM: CTAB without w/r/r  ABD: soft, nontender, bowel sounds present  NEURO: Alert and oriented x3.  No focal motor abnormalities.  Face symmetric.  PSYCH: anxious, chronologic thought,   SKIN: No rashes, bruising, or other lesions    Assessment and Plan     1. Adjustment disorder with anxious mood-encouraged continued follow up with Jessica, [place].  Would encourage psychiatry involvement margaret given pursuit disability.  Unclear what sleep agent was rx but will follow. JUAN  - PARoxetine (PAXIL) 20 MG tablet; Take 0.5 tablets (10 mg) by mouth At Bedtime For one week then increase to two tablets at bedtime  Dispense: 30 tablet; Refill: 1  - clonazePAM (KLONOPIN) 1 MG tablet; Take one tablet every morning and two tablets at night one hour before bed  " Dispense: 90 tablet; Refill: 0    History Lupus anticoagulant syndrome  -continue xarelto  -do not believe that sx described are related to use  -no red flag sx concerning for severe med interaction/reaction    Chronic pain:  -willing to go to PT   -assist in scheduling today    Options for treatment and follow-up care were reviewed with the patient and/or guardian. Lisandra Arauz and/or guardian engaged in the decision making process and verbalized understanding of the options discussed and agreed with the final plan.    Flaquita Starr MD

## 2018-06-29 RX ORDER — BUTALBITAL, ACETAMINOPHEN AND CAFFEINE 50; 325; 40 MG/1; MG/1; MG/1
1 TABLET ORAL 2 TIMES DAILY PRN
COMMUNITY
Start: 2018-06-11 | End: 2018-08-23

## 2018-08-16 ENCOUNTER — COMMUNICATION - HEALTHEAST (OUTPATIENT)
Dept: SCHEDULING | Facility: CLINIC | Age: 33
End: 2018-08-16

## 2018-08-17 DIAGNOSIS — I82.90 VTE (VENOUS THROMBOEMBOLISM): ICD-10-CM

## 2018-08-17 DIAGNOSIS — D68.62 LUPUS ANTICOAGULANT SYNDROME (H): ICD-10-CM

## 2018-08-17 NOTE — TELEPHONE ENCOUNTER
Plan requires the generic medication to be dispense.Please send a new RX for  generic or get prior.

## 2018-08-20 ENCOUNTER — TELEPHONE (OUTPATIENT)
Dept: FAMILY MEDICINE | Facility: CLINIC | Age: 33
End: 2018-08-20

## 2018-08-20 NOTE — TELEPHONE ENCOUNTER
Call to patient to follow up from recent ED visit for chest pain. Patient states that Zalaflex is helping a bit. She is scheduled for INR on Thursday this week as has restarted her Coumadin 2 weeks ago as Leobardo needs PA.  Call to pharmacy to fax GAURANG Cheung informed. Patient has appointment scheduled for 8/29 with Dr Starr.

## 2018-08-21 ENCOUNTER — TELEPHONE (OUTPATIENT)
Dept: FAMILY MEDICINE | Facility: CLINIC | Age: 33
End: 2018-08-21

## 2018-08-21 NOTE — TELEPHONE ENCOUNTER
Prior Authorization needed on:  8/21/18    Medication:  Xarelto Dose:  20mg    Pharmacy confirmed as   [id number] - [place], [place] - [address] [facility] [address]  [address]  [place] [address]  Phone: [phone] Fax: [phone]  : Yes    Insurance Name:  [place] Medicaid  Insurance Phone: [phone]  Insurance Patient ID:     Alternatives Suggested:      Clau Ingram August 21, 2018 at 9:31 AM

## 2018-08-23 ENCOUNTER — DOCUMENTATION ONLY (OUTPATIENT)
Dept: FAMILY MEDICINE | Facility: CLINIC | Age: 33
End: 2018-08-23

## 2018-08-23 DIAGNOSIS — D68.62 LUPUS ANTICOAGULANT SYNDROME (H): Primary | ICD-10-CM

## 2018-08-23 DIAGNOSIS — Z86.711 HISTORY OF PULMONARY EMBOLISM: ICD-10-CM

## 2018-08-23 DIAGNOSIS — F43.22 ADJUSTMENT DISORDER WITH ANXIOUS MOOD: ICD-10-CM

## 2018-08-23 LAB — INR PPP: 1.2

## 2018-08-23 RX ORDER — PAROXETINE 20 MG/1
20 TABLET, FILM COATED ORAL EVERY MORNING
Qty: 30 TABLET | Refills: 1 | Status: SHIPPED | OUTPATIENT
Start: 2018-08-23 | End: 2018-08-29

## 2018-08-27 ENCOUNTER — TELEPHONE (OUTPATIENT)
Dept: FAMILY MEDICINE | Facility: CLINIC | Age: 33
End: 2018-08-27

## 2018-08-27 RX ORDER — WARFARIN SODIUM 5 MG/1
5 TABLET ORAL DAILY
COMMUNITY
Start: 2018-08-27 | End: 2018-10-03 | Stop reason: ALTCHOICE

## 2018-08-27 NOTE — TELEPHONE ENCOUNTER
Return call from Mee.     She outlines that patient was a restricted recipient however this ended November 2016.     Notes that will be back to [employer] [place] September 1, 2018.

## 2018-08-27 NOTE — PROGRESS NOTES
Assessment and Plan     (D68.62) Lupus anticoagulant syndrome (H)  (primary encounter diagnosis)  Comment: Unfortunate insurance issue, seems occurred due to change in insurance provider (from [employer] to [place]). Requires PA completion. Historically has had labile INR with Warfarin use, main reason for transition to direct acting anticoagulant. While labile, has had therapeutic INRs on this dose. Concern for missed doses given issues with adherence historically. INR below goal today, unclear reason.   Plan: Temporary dose increase, then transition back to historical maintenance dose. PA completed today in clinic. Repeat INR during appointment with Dr. Starr.     Using Warfarin Pill size at home: 5 mg (peach), weekly Warfarin dosing as follows:     Sunday Dose: 5mg Monday Dose: 5mg Tuesday Dose: 5mg Wednesday Dose: 5mg Thursday Dose: 10mg Friday Dose: 5mg Saturday Dose: 5mg      Recheck INR: 1 week is recommended. INR Recheck Date: 08/29/18 approximate.      (F43.22) Adjustment disorder with anxious mood  Comment: Appropriate timing for refill request given last Rx date.   Plan: PARoxetine (PAXIL) 20 MG tablet - refilled per CPA.     Insurance requirements  Comment: Unclear if patient is still restricted. Also, unclear requirements of specific program. Experience with other restricted recipient programs have allowed use of other providers when needed, just need to call and inform program. Need to determine to limit unnecessary ED use.   Plan: Left message for [place] Medicaid Restricted Recipient program.       Follow up: Pharmacist available per patient or provider request.     Options for treatment and/or follow-up care were reviewed with the patient. Lisandra was engaged and actively involved in the decision making process. She verbalized understanding of the options discussed and was satisfied with the final plan. Patient was provided with written instructions/medication list via AVS.    Dr. Starr was provided our  recommendations in clinic today and Dr. Torres was available for supervision during this visit and is the authorizing prescriber for this visit through the pharmacist collaborative practice agreement.    Thank you for the opportunity to participate in the care of this patient.  Nilam Baez, Pharm.D.  [facility]: [phone]    Current Outpatient Prescriptions   Medication Sig Dispense Refill     clonazePAM (KLONOPIN) 1 MG tablet Take one tablet every morning and two tablets at night one hour before bed 90 tablet 0     PARoxetine (PAXIL) 20 MG tablet Take 1 tablet (20 mg) by mouth every morning 30 tablet 1     warfarin (COUMADIN) 5 MG tablet Take 1 tablet (5 mg) by mouth daily              HPI:       Lisandra Arauz is a 33 year old female referred by Dr. Starr for INR monitoring. Lisandra best main concern today is lack of insurance coverage for Xarlto, had to switch back to Warfarin now 2 weeks ago.     # Anticoagulation: Warfarin dose for last 7 days:    Sunday 5 mg   Monday 5 mg   Tuesday 5 mg   Wednesday 5 mg   Thursday 5 mg   Friday  5 mg   Saturday 5 mg     Warfarin Pill size at home: 5 mg (peach)    Since last visit:   1) Taking Warfarin as prescribed?: No. Missed doses?: No, Extra doses?: No  2) Diet/Med changes: None   3) Has pt been sick/diarrhea/vomited in last week? : No   4) Bleeding/Bruising/Complications: None  6) Warfarin refill needed?: Yes - Supply running low. However, would like to switch back to Xarelto if insurance will allow.     # Anxiety: Requesting refill today of Paroxetine. Also needs refill of Clonazepam but knows she will have to ask this of Dr. Starr. Upcoming appointment scheduled for next week.     # Insurance: Went to ED as Dr. Starr did not have any appointments available. Knows that she is restricted to Dr. Starr. Was not aware that she could be seen by someone else.            PMHX:     Patient Active Problem List   Diagnosis     Lupus  anticoagulant syndrome (H)     Intractable chronic migraine without aura     Gastroesophageal reflux disease     Chronic pain     Dysfunctional uterine bleeding     Generalized anxiety disorder     Obesity (BMI 30.0-34.9)     Restless legs syndrome     Cervical intraepithelial neoplasia grade III with severe dysplasia     Tobacco use disorder     Pap smear for cervical cancer screening     PTSD (post-traumatic stress disorder)     Allergies   Allergen Reactions     Hydrocodone-Acetaminophen Anaphylaxis     Phenothiazines      Other reaction(s): Wheezing     Prochlorperazine Anaphylaxis, Other (See Comments) and Swelling     Gabapentin Other (See Comments)     Nausea and vomiting, anxiety     Oxycodone-Acetaminophen Other (See Comments)     itching     Tramadol Other (See Comments)     Mood swings, anger.             Objective       Lab Results   Component Value Date    INR 1.2 08/23/2018    INR 1.6 05/25/2018    INR 1.2 05/18/2018    INR 1.0 03/12/2018    INR 1.7 08/29/2017    INR 1.5 03/28/2017    INR 1.2 12/13/2016    INR 2.9 08/05/2016    INR 2.90 07/08/2016    INR 1.6 02/29/2016    INR 2.1 01/22/2016           [facility] Pharmacist Documentation     Drug therapy problems identified:  Medical Condition 1: Anticoagulation, Goals of therapy: Not at goal, Drug Class: Anticoagulant - oral, Efficacy: Partially effective, Intervention: Change dose, Verification: Patient Agreed - CPA    Relevant medical devices: n/a    # of medical conditions addressed: 2  # of medications addressed: 4  # of DTP identified: 1  Time spent: 20 minutes  Level of service per [place] DHS guidelines: 2

## 2018-08-27 NOTE — TELEPHONE ENCOUNTER
PA completed in [facility].     Of note, reason for PA due to change in [place] [employer] to [place]. Will be back to [employer] 9/1/18.

## 2018-08-29 ENCOUNTER — OFFICE VISIT (OUTPATIENT)
Dept: FAMILY MEDICINE | Facility: CLINIC | Age: 33
End: 2018-08-29
Payer: MEDICAID

## 2018-08-29 VITALS
BODY MASS INDEX: 26.7 KG/M2 | SYSTOLIC BLOOD PRESSURE: 127 MMHG | TEMPERATURE: 98.9 F | HEART RATE: 84 BPM | OXYGEN SATURATION: 98 % | RESPIRATION RATE: 16 BRPM | WEIGHT: 141.4 LBS | HEIGHT: 61 IN | DIASTOLIC BLOOD PRESSURE: 85 MMHG

## 2018-08-29 DIAGNOSIS — F43.22 ADJUSTMENT DISORDER WITH ANXIOUS MOOD: ICD-10-CM

## 2018-08-29 DIAGNOSIS — D68.62 LUPUS ANTICOAGULANT SYNDROME (H): Primary | ICD-10-CM

## 2018-08-29 LAB — INR PPP: 2.1

## 2018-08-29 RX ORDER — PAROXETINE 20 MG/1
20 TABLET, FILM COATED ORAL EVERY MORNING
Qty: 30 TABLET | Refills: 1 | Status: SHIPPED | OUTPATIENT
Start: 2018-08-29 | End: 2018-10-31

## 2018-08-29 RX ORDER — CLONAZEPAM 1 MG/1
TABLET ORAL
Qty: 90 TABLET | Refills: 0 | Status: SHIPPED | OUTPATIENT
Start: 2018-08-29 | End: 2018-10-31

## 2018-08-29 NOTE — MR AVS SNAPSHOT
"              After Visit Summary   8/29/2018    Lisandra Arauz    MRN: [id number]           Patient Information     Date Of Birth          1985        Visit Information        Provider Department      8/29/2018 10:20 AM Flaquita Starr MD [facility]        Today's Diagnoses     Lupus anticoagulant syndrome (H)    -  1    Adjustment disorder with anxious mood          Care Instructions    For anticoagulation:  -we are checking your INR today  -call pharmacy and try to fill your Xarelto on 9/1  -call Nilam and leave message if the Xarelto fill doesn't go through    Dr Starr refilling your Klonopin and Paxil today    Make sure you get to your appointment on the 5th.           Follow-ups after your visit        Who to contact     Please call your clinic at [phone] to:    Ask questions about your health    Make or cancel appointments    Discuss your medicines    Learn about your test results    Speak to your doctor            Additional Information About Your Visit        Care EveryWhere ID     This is your Care EveryWhere ID. This could be used by other organizations to access your [facility] medical records  [id number]        Your Vitals Were     Pulse Temperature Respirations Height Last Period Pulse Oximetry    84 98.9  F (37.2  C) (Oral) 16 5' 1\" (154.9 cm) 08/08/2018 98%    BMI (Body Mass Index)                   26.72 kg/m2            Blood Pressure from Last 3 Encounters:   08/29/18 127/85   06/19/18 136/88   05/18/18 (!) 136/99    Weight from Last 3 Encounters:   08/29/18 141 lb 6.4 oz (64.1 kg)   06/19/18 143 lb 9.6 oz (65.1 kg)   05/18/18 146 lb (66.2 kg)              We Performed the Following     INR (P )          Where to get your medicines      These medications were sent to [facility] - [place] [facility], [place] - [address] AT [place] [address]  [address], [place] [address]     Phone:  [phone]     PARoxetine 20 MG tablet       "   Some of these will need a paper prescription and others can be bought over the counter.  Ask your nurse if you have questions.     Bring a paper prescription for each of these medications     clonazePAM 1 MG tablet          Primary Care Provider Office Phone # Fax #    Flaquita Starr [phone]       [facility] [address] [place] [address]        Equal Access to Services     [facility] : Hadii aad ku hadasho Soomaali, waaxda luqadaha, qaybta kaalmada adeegyada, waxay idiin hayaan adeeg kharash la'aan ah. So [facility] [phone].    ATENCIÓN: Si habla español, tiene a rivas disposición servicios gratuitos de asistencia lingüística. Polly al [phone].    We comply with applicable federal civil rights laws and [place] laws. We do not discriminate on the basis of race, color, national origin, age, disability, sex, sexual orientation, or gender identity.            Thank you!     Thank you for choosing [facility]  for your care. Our goal is always to provide you with excellent care. Hearing back from our patients is one way we can continue to improve our services. Please take a few minutes to complete the written survey that you may receive in the mail after your visit with us. Thank you!             Your Updated Medication List - Protect others around you: Learn how to safely use, store and throw away your medicines at www.disposemymeds.org.          This list is accurate as of 8/29/18 11:12 AM.  Always use your most recent med list.                   Brand Name Dispense Instructions for use Diagnosis    clonazePAM 1 MG tablet    klonoPIN    90 tablet    Take one tablet every morning and two tablets at night one hour before bed    Adjustment disorder with anxious mood       PARoxetine 20 MG tablet    PAXIL    30 tablet    Take 1 tablet (20 mg) by mouth every morning    Adjustment disorder with anxious mood, Lupus anticoagulant syndrome (H)       warfarin 5 MG  tablet    COUMADIN     Take 1 tablet (5 mg) by mouth daily    Lupus anticoagulant syndrome (H), Adjustment disorder with anxious mood

## 2018-08-29 NOTE — LETTER
August 30, 2018      Lisandra Arauz  [address]  [facility] [address]        Dear Lisandra,  INR is therapeutic!  Continue with current warfarin plan until Xarelto can be picked up, then switch to Xarelto only. Thank you    Please see below for your test results.    Resulted Orders   INR ([facility])   Result Value Ref Range    INR 2.1       Comment:      Adult Normal Range: 0.90 - 1.10  Therapeutic Range: 2.0 - 3.5         If you have any questions, please call the clinic to make an appointment.    Sincerely,    Flaquita Starr MD

## 2018-08-29 NOTE — PATIENT INSTRUCTIONS
For anticoagulation:  -we are checking your INR today  -call pharmacy and try to fill your Xarelto on 9/1  -call [facility] and leave message if the Xarelto fill doesn't go through    Dr Starr refilling your Klonopin and Paxil today    Make sure you get to your appointment on the 5th.

## 2018-08-29 NOTE — PROGRESS NOTES
HPI:       Lisandra Arauz is a 33 year old  female who presents to address the following concerns:    Chief Complaint   Patient presents with     ER F/U     seen for shortness of breath and pain in left shoulder, into upper back,       Psychiatry:  -met with psych but refills not taken over yet  -needs refills while she is going through process of establishing  -feeling like the clonazepam   -has next psychiatry appointment 9/5/18    Lupus anticoagulant syndrome:  -due to insurance issues needed to switch back to     Cold sx:  -Son Guille has had several colds  -worried she is getting cold but not sure if she can takeanything with coumadin    When she goes and works and is on feet reports that she has trouble with pain and fatigue.  If she doesn't take breaks her muscles tense up.           PMHX:     Patient Active Problem List   Diagnosis     Lupus anticoagulant syndrome (H)     Intractable chronic migraine without aura     Gastroesophageal reflux disease     Chronic pain     Dysfunctional uterine bleeding     Generalized anxiety disorder     Obesity (BMI 30.0-34.9)     Restless legs syndrome     Cervical intraepithelial neoplasia grade III with severe dysplasia     Tobacco use disorder     Pap smear for cervical cancer screening     PTSD (post-traumatic stress disorder)       Current Outpatient Prescriptions   Medication Sig Dispense Refill     clonazePAM (KLONOPIN) 1 MG tablet Take one tablet every morning and two tablets at night one hour before bed 90 tablet 0     PARoxetine (PAXIL) 20 MG tablet Take 1 tablet (20 mg) by mouth every morning 30 tablet 1     warfarin (COUMADIN) 5 MG tablet Take 1 tablet (5 mg) by mouth daily            Allergies   Allergen Reactions     Hydrocodone-Acetaminophen Anaphylaxis     Phenothiazines      Other reaction(s): Wheezing     Prochlorperazine Anaphylaxis, Other (See Comments) and Swelling     Gabapentin Other (See Comments)     Nausea and vomiting, anxiety      "Oxycodone-Acetaminophen Other (See Comments)     itching     Tramadol Other (See Comments)     Mood swings, anger.        No results found for this or any previous visit (from the past 24 hour(s)).    Current Outpatient Prescriptions   Medication     clonazePAM (KLONOPIN) 1 MG tablet     PARoxetine (PAXIL) 20 MG tablet     warfarin (COUMADIN) 5 MG tablet     No current facility-administered medications for this visit.               Review of Systems:   ROS as described above.  Denies F/S/C/N/V/SOB/CP          Physical Exam:     Vitals:    08/29/18 1020   BP: 127/85   Pulse: 84   Resp: 16   Temp: 98.9  F (37.2  C)   TempSrc: Oral   SpO2: 98%   Weight: 141 lb 6.4 oz (64.1 kg)   Height: 5' 1\" (154.9 cm)     Body mass index is 26.72 kg/(m^2).    GEN: patient sitting comfortably in NAD  HEEN: Head is atraumatic, normocephalic, eyes anicteric, mucous membranes moist  CV: RRR w/o M/R/G  PULM: CTAB without w/r/r  ABD: soft, nontender, bowel sounds present  NEURO: Alert and oriented x3.  No focal motor abnormalities.  Face symmetric.  PSYCH: appropriate.  Mildly anxious  SKIN: No rashes, bruising, or other lesions    Results for orders placed or performed in visit on 08/29/18   INR ([facility])   Result Value Ref Range    INR 2.1        Assessment and Plan     1. Lupus anticoagulant syndrome (H)-at goal for first time in years.  Despite current   - INR (P )  - PARoxetine (PAXIL) 20 MG tablet; Take 1 tablet (20 mg) by mouth every morning  Dispense: 30 tablet; Refill: 1    2. Adjustment disorder with anxious mood-continue to pursue co-management psychiatry/psychology.  Patient currently suffers from PTSD, [place], insomnia.  Recommend continued management/treatment  - clonazePAM (KLONOPIN) 1 MG tablet; Take one tablet every morning and two tablets at night one hour before bed  Dispense: 90 tablet; Refill: 0nn  - PARoxetine (PAXIL) 20 MG tablet; Take 1 tablet (20 mg) by mouth every morning  Dispense: 30 tablet; Refill: 1    Options " for treatment and follow-up care were reviewed with the patient and/or guardian. Lisandra Arauz and/or guardian engaged in the decision making process and verbalized understanding of the options discussed and agreed with the final plan.    Flaquita Starr MD

## 2018-08-30 NOTE — PROGRESS NOTES
Please call patient with result.  Her INR is therapeutic!  COntinue with current warfarin plan until Xarelto can be picked up, then switch to Xarelto only

## 2018-10-03 ENCOUNTER — OFFICE VISIT (OUTPATIENT)
Dept: FAMILY MEDICINE | Facility: CLINIC | Age: 33
End: 2018-10-03
Payer: COMMERCIAL

## 2018-10-03 VITALS
DIASTOLIC BLOOD PRESSURE: 85 MMHG | TEMPERATURE: 99 F | BODY MASS INDEX: 27.23 KG/M2 | HEART RATE: 95 BPM | WEIGHT: 144.2 LBS | HEIGHT: 61 IN | OXYGEN SATURATION: 99 % | SYSTOLIC BLOOD PRESSURE: 129 MMHG

## 2018-10-03 DIAGNOSIS — D68.62 LUPUS ANTICOAGULANT SYNDROME (H): ICD-10-CM

## 2018-10-03 DIAGNOSIS — M62.838 NECK MUSCLE SPASM: Primary | ICD-10-CM

## 2018-10-03 RX ORDER — TIZANIDINE 2 MG/1
2 TABLET ORAL
COMMUNITY
Start: 2018-09-13 | End: 2018-12-18

## 2018-10-03 RX ORDER — PROPRANOLOL HCL 60 MG
60 CAPSULE, EXTENDED RELEASE 24HR ORAL
COMMUNITY
Start: 2018-09-13 | End: 2018-10-31 | Stop reason: ALTCHOICE

## 2018-10-03 ASSESSMENT — ENCOUNTER SYMPTOMS
BACK PAIN: 0
RESPIRATORY NEGATIVE: 1
NECK PAIN: 1
CARDIOVASCULAR NEGATIVE: 1

## 2018-10-03 NOTE — MR AVS SNAPSHOT
"              After Visit Summary   10/3/2018    Lisandra Arauz    MRN: [id number]           Patient Information     Date Of Birth          1985        Visit Information        Provider Department      10/3/2018 9:40 AM Gerard Gooden MD [facility]        Today's Diagnoses     Neck muscle spasm    -  1    Lupus anticoagulant syndrome (H)           Follow-ups after your visit        Additional Services     ONC/HEME ADULT REFERRAL       Reason for Referral: Lupus     needed: No  Language: English    May leave message on voicemail: No    ([facility] Only) Referral should be tracked (yes)?yes                  Your next 10 appointments already scheduled     Nov 06, 2018  9:40 AM CST   Return Visit with Flaquita Starr MD   [facility] ([facility])    [address] [place] [id number]   [phone]              Who to contact     Please call your clinic at [phone] to:    Ask questions about your health    Make or cancel appointments    Discuss your medicines    Learn about your test results    Speak to your doctor            Additional Information About Your Visit        Care EveryWhere ID     This is your Care EveryWhere ID. This could be used by other organizations to access your [place] medical records  [id number]        Your Vitals Were     Pulse Temperature Height Pulse Oximetry BMI (Body Mass Index)       95 99  F (37.2  C) (Oral) 5' 1\" (154.9 cm) 99% 27.25 kg/m2        Blood Pressure from Last 3 Encounters:   10/03/18 129/85   08/29/18 127/85   06/19/18 136/88    Weight from Last 3 Encounters:   10/03/18 144 lb 3.2 oz (65.4 kg)   08/29/18 141 lb 6.4 oz (64.1 kg)   06/19/18 143 lb 9.6 oz (65.1 kg)              We Performed the Following     ONC/HEME ADULT REFERRAL          Today's Medication Changes          These changes are accurate as of 10/3/18 11:34 AM.  If you have any questions, ask your nurse or doctor.               Stop taking these " medicines if you haven't already. Please contact your care team if you have questions.     warfarin 5 MG tablet   Commonly known as:  COUMADIN   Stopped by:  Gerard Gooden MD                    Primary Care Provider Office Phone # Fax #    Flaquita Starr [phone]       [facility] [address] [place] [address]        Equal Access to Services     [facility]: Hadii aad ku hadasho Soomaali, waaxda luqadaha, qaybta kaalmada adeegyada, waxay idiin hayaan adeeg kharash la'aan . So [facility] [phone].    ATENCIÓN: Si habla español, tiene a rivas disposición servicios gratuitos de asistencia lingüística. Polly al [phone].    We comply with applicable federal civil rights laws and [place] laws. We do not discriminate on the basis of race, color, national origin, age, disability, sex, sexual orientation, or gender identity.            Thank you!     Thank you for choosing [facility]  for your care. Our goal is always to provide you with excellent care. Hearing back from our patients is one way we can continue to improve our services. Please take a few minutes to complete the written survey that you may receive in the mail after your visit with us. Thank you!             Your Updated Medication List - Protect others around you: Learn how to safely use, store and throw away your medicines at www.disposemymeds.org.          This list is accurate as of 10/3/18 11:34 AM.  Always use your most recent med list.                   Brand Name Dispense Instructions for use Diagnosis    clonazePAM 1 MG tablet    klonoPIN    90 tablet    Take one tablet every morning and two tablets at night one hour before bed    Adjustment disorder with anxious mood       PARoxetine 20 MG tablet    PAXIL    30 tablet    Take 1 tablet (20 mg) by mouth every morning    Adjustment disorder with anxious mood, Lupus anticoagulant syndrome (H)       propranolol 60 MG 24 hr capsule    INDERAL LA      Take 60 mg by mouth        rivaroxaban ANTICOAGULANT 20 MG Tabs tablet    XARELTO     Take 20 mg by mouth        tiZANidine 2 MG tablet    ZANAFLEX     Take 2 mg by mouth

## 2018-10-03 NOTE — PROGRESS NOTES
Assessment and Plan     1. Neck muscle spasm  I don't think the visualized blood clot (old vs artifact) has anything to do with her pain. We did some active stretching and counter-strain which improved her ROM and pain. Encouraged follow up with physical therapy and return to clinic if not improving. Discussed the natural history of the disease. Answered all of her questions.    2. Lupus anticoagulant syndrome (H)  Will place the referral that was requested by Rheum. Discussed our body's clotting system and how anticoagulants work. Reviewed her UC note with her.  - ONC/HEME ADULT REFERRAL    There seems to be some loss of continuity of care with her various specialists and she may benefit from increased cares. I recommend that she return to clinic to see her PCP and review the status of her chronic conditions. Appointment booked.    Overall, I counseled and coordinated care on the above issues for greater than 50% of the 25 minute face-to-face visit.    Options for treatment and follow-up care were reviewed with the patient and/or guardian. Lisandra Arauz and/or guardian engaged in the decision making process and verbalized understanding of the options discussed and agreed with the final plan. I answered all of their questions.    Gerard Gooden III, MD, FAAFP  [facility]  10/03/18 10:45 AM           HPI:       Lisandra Arauz is a 33 year old  female  Patient presents with:  Other: Blood clot right shoulder  Medication Reconciliation: completed    Has been having neck pain for about two weeks now. Gets worse with movement. Saw  and they found what appeared to be an old blood clot in her arm. Was previously referred to heme by rheum but was unable to go because of insurance issues. This has now been resolved. Also, due to insurance issues, she was being switched from Xarelto to warfarin and now back on Xarelto. She was getting cortisone shots with rheum but doesn't know if they were helping much  and she hasn't seen them in months. Does follow with her headache doctor still. Denies trauma to the neck. She already has an appointment set up with physical therapy to start in 1-2 weeks. Would like a second opinion (from me) on her neck and the blood clot.         PMHX:     Patient Active Problem List   Diagnosis     Lupus anticoagulant syndrome (H)     Intractable chronic migraine without aura     Gastroesophageal reflux disease     Chronic pain     Dysfunctional uterine bleeding     Generalized anxiety disorder     Obesity (BMI 30.0-34.9)     Restless legs syndrome     Cervical intraepithelial neoplasia grade III with severe dysplasia     Tobacco use disorder     Pap smear for cervical cancer screening     PTSD (post-traumatic stress disorder)       Current Outpatient Prescriptions   Medication Sig Dispense Refill     PARoxetine (PAXIL) 20 MG tablet Take 1 tablet (20 mg) by mouth every morning 30 tablet 1     propranolol (INDERAL LA) 60 MG 24 hr capsule Take 60 mg by mouth       rivaroxaban ANTICOAGULANT (XARELTO) 20 MG TABS tablet Take 20 mg by mouth       tiZANidine (ZANAFLEX) 2 MG tablet Take 2 mg by mouth       clonazePAM (KLONOPIN) 1 MG tablet Take one tablet every morning and two tablets at night one hour before bed 90 tablet 0       Social History     Social History     Marital status: Single     Spouse name: N/A     Number of children: N/A     Years of education: N/A     Occupational History     Not on file.     Social History Main Topics     Smoking status: Current Every Day Smoker     Types: Cigarettes     Smokeless tobacco: Never Used      Comment: about 6 per day     Alcohol use No     Drug use: No     Sexual activity: Not on file     Other Topics Concern     Not on file     Social History Narrative       Allergies   Allergen Reactions     Hydrocodone-Acetaminophen Anaphylaxis     Phenothiazines      Other reaction(s): Wheezing     Prochlorperazine Anaphylaxis, Other (See Comments) and Swelling      "Gabapentin Other (See Comments)     Nausea and vomiting, anxiety     Oxycodone-Acetaminophen Other (See Comments)     itching     Tramadol Other (See Comments)     Mood swings, anger.        No results found for this or any previous visit (from the past 24 hour(s)).         Review of Systems:     Review of Systems   Constitutional: Positive for malaise/fatigue.   Respiratory: Negative.    Cardiovascular: Negative.    Musculoskeletal: Positive for neck pain. Negative for back pain.            Physical Exam:     Vitals:    10/03/18 0946   BP: 129/85   Pulse: 95   Temp: 99  F (37.2  C)   TempSrc: Oral   SpO2: 99%   Weight: 144 lb 3.2 oz (65.4 kg)   Height: 5' 1\" (154.9 cm)     Body mass index is 27.25 kg/(m^2).    Physical Exam   Musculoskeletal:        Arms:          "

## 2018-10-04 NOTE — PATIENT INSTRUCTIONS
Referral for ( TEST )  :      ONC/HEME  LOCATION/PLACE/Provider :    [facility] ON[facility]  DATE & TIME :     To be determined   PHONE :     [phone]  FAX :     [phone]  ADDITIONAL INFORMATION :     Referral along with OV notes faxed to [facility] and a nurse navigator will contact patient to arrange appointment.   Appointment made by clinic staff/:    Mikayla

## 2018-10-31 ENCOUNTER — OFFICE VISIT (OUTPATIENT)
Dept: FAMILY MEDICINE | Facility: CLINIC | Age: 33
End: 2018-10-31
Payer: COMMERCIAL

## 2018-10-31 VITALS
BODY MASS INDEX: 27.75 KG/M2 | SYSTOLIC BLOOD PRESSURE: 132 MMHG | OXYGEN SATURATION: 100 % | WEIGHT: 147 LBS | HEART RATE: 66 BPM | TEMPERATURE: 98.5 F | DIASTOLIC BLOOD PRESSURE: 88 MMHG | HEIGHT: 61 IN

## 2018-10-31 DIAGNOSIS — F43.22 ADJUSTMENT DISORDER WITH ANXIOUS MOOD: ICD-10-CM

## 2018-10-31 DIAGNOSIS — I82.90 VTE (VENOUS THROMBOEMBOLISM): Primary | ICD-10-CM

## 2018-10-31 DIAGNOSIS — Z23 NEEDS FLU SHOT: ICD-10-CM

## 2018-10-31 RX ORDER — CLONAZEPAM 1 MG/1
TABLET ORAL
Qty: 90 TABLET | Refills: 0 | Status: SHIPPED | OUTPATIENT
Start: 2018-10-31 | End: 2018-12-18

## 2018-10-31 RX ORDER — PAROXETINE 20 MG/1
20 TABLET, FILM COATED ORAL EVERY MORNING
Qty: 90 TABLET | Refills: 3 | Status: SHIPPED | OUTPATIENT
Start: 2018-10-31 | End: 2019-06-21

## 2018-10-31 NOTE — PROGRESS NOTES
Assessment and Plan     1. VTE (venous thromboembolism)  Continue Xarelto. Recheck in 1 year. Plan for lifetime anticoag.  - rivaroxaban ANTICOAGULANT (XARELTO) 20 MG TABS tablet; Take 1 tablet (20 mg) by mouth daily (with dinner)  Dispense: 30 tablet; Refill: 11    2. Adjustment disorder with anxious mood  Mood doing OK. Refill and continue follow up after her insurance returns. Don't plan on starting the propranolol for now because of her low heart rate and she hasn't been taking it anyway.  - clonazePAM (KLONOPIN) 1 MG tablet; Take one tablet every morning and two tablets at night one hour before bed  Dispense: 90 tablet; Refill: 0  - PARoxetine (PAXIL) 20 MG tablet; Take 1 tablet (20 mg) by mouth every morning  Dispense: 90 tablet; Refill: 3    3. Needs flu shot  - FLU VAC PRESRV FREE QUAD SPLIT VIR IM, 0.5 mL dosage  - ADMIN VACCINE, INITIAL    Return to clinic for further discussion about her headaches and management when available.    Options for treatment and follow-up care were reviewed with the patient and/or guardian. Lisandra Arauz and/or guardian engaged in the decision making process and verbalized understanding of the options discussed and agreed with the final plan. I answered all of their questions.    Gerard Gooden III, MD, FAAFP  [facility]  10/31/18 9:59 AM           HPI:       Lisandra Arauz is a 33 year old  female  Patient presents with:  Refill Request: Clonazepam, Xarelto and Paxil (insurance ending tomorrow)  Medication Reconciliation: completed  Flu Shot    Anxiety is controlled with paxil and klonopin. She takes at prescribed. Denies SI/HI. Has never taken the propranolol    Doing well on her Xarelto. Denies abnormal bleeding. Had a DVT and PE.    Is losing her current insurance but will likely be getting social security.    Needs flu shot.           PMHX:     Patient Active Problem List   Diagnosis     Lupus anticoagulant syndrome (H)     Intractable chronic migraine  without aura     Gastroesophageal reflux disease     Chronic pain     Dysfunctional uterine bleeding     Generalized anxiety disorder     Obesity (BMI 30.0-34.9)     Restless legs syndrome     Cervical intraepithelial neoplasia grade III with severe dysplasia     Tobacco use disorder     Pap smear for cervical cancer screening     PTSD (post-traumatic stress disorder)       Current Outpatient Prescriptions   Medication Sig Dispense Refill     clonazePAM (KLONOPIN) 1 MG tablet Take one tablet every morning and two tablets at night one hour before bed 90 tablet 0     PARoxetine (PAXIL) 20 MG tablet Take 1 tablet (20 mg) by mouth every morning 90 tablet 3     rivaroxaban ANTICOAGULANT (XARELTO) 20 MG TABS tablet Take 1 tablet (20 mg) by mouth daily (with dinner) 30 tablet 11     tiZANidine (ZANAFLEX) 2 MG tablet Take 2 mg by mouth       [DISCONTINUED] clonazePAM (KLONOPIN) 1 MG tablet Take one tablet every morning and two tablets at night one hour before bed 90 tablet 0     [DISCONTINUED] PARoxetine (PAXIL) 20 MG tablet Take 1 tablet (20 mg) by mouth every morning 30 tablet 1     [DISCONTINUED] rivaroxaban ANTICOAGULANT (XARELTO) 20 MG TABS tablet Take 20 mg by mouth         Social History     Social History     Marital status: Single     Spouse name: N/A     Number of children: N/A     Years of education: N/A     Occupational History     Not on file.     Social History Main Topics     Smoking status: Current Every Day Smoker     Types: Cigarettes     Smokeless tobacco: Never Used      Comment: about 6 per day     Alcohol use No     Drug use: No     Sexual activity: Not on file     Other Topics Concern     Not on file     Social History Narrative       Allergies   Allergen Reactions     Hydrocodone-Acetaminophen Anaphylaxis     Phenothiazines      Other reaction(s): Wheezing     Prochlorperazine Anaphylaxis, Other (See Comments) and Swelling     Gabapentin Other (See Comments)     Nausea and vomiting, anxiety      "Oxycodone-Acetaminophen Other (See Comments)     itching     Tramadol Other (See Comments)     Mood swings, anger.        No results found for this or any previous visit (from the past 24 hour(s)).         Review of Systems:     Review of Systems   All other systems reviewed and are negative.           Physical Exam:     Vitals:    10/31/18 0935   BP: 132/88   Pulse: 66   Temp: 98.5  F (36.9  C)   TempSrc: Oral   SpO2: 100%   Weight: 147 lb (66.7 kg)   Height: 5' 1.02\" (155 cm)     Body mass index is 27.75 kg/(m^2).    Physical Exam   Constitutional: She is oriented to person, place, and time and well-developed, well-nourished, and in no distress. She appears to not be writhing in pain.  Non-toxic appearance. She does not have a sickly appearance.   Pulmonary/Chest: No respiratory distress.   Neurological: She is alert and oriented to person, place, and time. Gait normal. GCS score is 15.   Psychiatric: Affect normal.   Nursing note and vitals reviewed.          "

## 2018-10-31 NOTE — PATIENT INSTRUCTIONS
Come back when you get your new insurance for follow up of your headaches.     Refills sent to your pharmacy for Paxil and Xarelto. Clonazepam RX printed and given today.       ```````````````````````````````````````````````````````````````````````````````````````````````````````````````````````````````  Your medication list is printed, please keep this with you, it is helpful to bring this current list to any other medical appointments, the emergency room or hospital.    If you had lab testing today and your results are reassuring or normal they will be be mailed to you within 7 days.     If the lab tests need quick action we will call you with the results.   The phone number we will call with results is # [phone] (home) . If this is not the best number please call our clinic and change the number.    If you need any refills please call your pharmacy and they will contact us.    If you have any further concerns or wish to schedule another appointment you must call our office during normal business hours  [phone] (8-5:00 M-F)  If you have urgent medical questions that cannot wait  you may also call [phone] at any time of day.  If you have a medical emergency please call [phone].    Thank you for coming to [facility].

## 2018-10-31 NOTE — NURSING NOTE
"Chief Complaint   Patient presents with     Refill Request     Clonazepam, Xarelto and Paxil (insurance ending tomorrow)     Medication Reconciliation     completed     Flu Shot       /88  Pulse 66  Temp 98.5  F (36.9  C) (Oral)  Ht 5' 1.02\" (155 cm)  Wt 147 lb (66.7 kg)  LMP 10/08/2018  SpO2 100%  BMI 27.75 kg/m2      Injectable influenza vaccine documentation    1. Has the patient received the information for the influenza vaccine? YES    2. Does the patient have a severe allergy to eggs (Patients with a severe egg allergy should be assessed by a medical provider, RN, or clinical pharmacist. If they receive the influenza vaccine, please have them observed for 15 minutes.)? No    3. Has the patient had an allergic reaction to previous influenza vaccines? No    4. Has the patient had any severe allergic reactions to past influenza vaccines ? No       5. Does patient have a history of Guillain[place] syndrome? No      Based on responses above, I administered the influenza vaccine.    Ousmane Hong CMA      "

## 2018-10-31 NOTE — MR AVS SNAPSHOT
After Visit Summary   10/31/2018    Lisandra Arauz    MRN: [id number]           Patient Information     Date Of Birth          1985        Visit Information        Provider Department      10/31/2018 9:20 AM Gerard Gooden MD [facility]        Today's Diagnoses     VTE (venous thromboembolism)    -  1    Adjustment disorder with anxious mood        Lupus anticoagulant syndrome (H)          Care Instructions      Come back when you get your new insurance for follow up of your headaches.     Refills sent to your pharmacy for Paxil and Xarelto. Clonazepam RX printed and given today.       ```````````````````````````````````````````````````````````````````````````````````````````````````````````````````````````````  Your medication list is printed, please keep this with you, it is helpful to bring this current list to any other medical appointments, the emergency room or hospital.    If you had lab testing today and your results are reassuring or normal they will be be mailed to you within 7 days.     If the lab tests need quick action we will call you with the results.   The phone number we will call with results is # [phone] (home) . If this is not the best number please call our clinic and change the number.    If you need any refills please call your pharmacy and they will contact us.    If you have any further concerns or wish to schedule another appointment you must call our office during normal business hours  [phone] (8-5:00 M-F)  If you have urgent medical questions that cannot wait  you may also call [phone] at any time of day.  If you have a medical emergency please call [phone].    Thank you for coming to [facility].            Follow-ups after your visit        Your next 10 appointments already scheduled     Nov 06, 2018  9:40 AM CST   Return Visit with Flaquita Starr MD   [facility] ([facility])    [address] [place] [address]  "[place] [address]   [phone]              Who to contact     Please call your clinic at [phone] to:    Ask questions about your health    Make or cancel appointments    Discuss your medicines    Learn about your test results    Speak to your doctor            Additional Information About Your Visit        Care EveryWhere ID     This is your Care EveryWhere ID. This could be used by other organizations to access your [place] medical records  [id number]        Your Vitals Were     Pulse Temperature Height Last Period Pulse Oximetry BMI (Body Mass Index)    66 98.5  F (36.9  C) (Oral) 5' 1.02\" (155 cm) 10/08/2018 100% 27.75 kg/m2       Blood Pressure from Last 3 Encounters:   10/31/18 132/88   10/03/18 129/85   08/29/18 127/85    Weight from Last 3 Encounters:   10/31/18 147 lb (66.7 kg)   10/03/18 144 lb 3.2 oz (65.4 kg)   08/29/18 141 lb 6.4 oz (64.1 kg)              Today, you had the following     No orders found for display         Today's Medication Changes          These changes are accurate as of 10/31/18  9:57 AM.  If you have any questions, ask your nurse or doctor.               These medicines have changed or have updated prescriptions.        Dose/Directions    rivaroxaban ANTICOAGULANT 20 MG Tabs tablet   Commonly known as:  XARELTO   This may have changed:  when to take this   Used for:  VTE (venous thromboembolism)   Changed by:  Gerard Gooden MD        Dose:  20 mg   Take 1 tablet (20 mg) by mouth daily (with dinner)   Quantity:  30 tablet   Refills:  11         Stop taking these medicines if you haven't already. Please contact your care team if you have questions.     propranolol 60 MG 24 hr capsule   Commonly known as:  INDERAL LA   Stopped by:  Gerard Gooden MD                Where to get your medicines      These medications were sent to [id number] - [facility], [place] - [address] [facility] [address]  [address], [facility] [address]     Phone:  " [phone]     PARoxetine 20 MG tablet    rivaroxaban ANTICOAGULANT 20 MG Tabs tablet         Some of these will need a paper prescription and others can be bought over the counter.  Ask your nurse if you have questions.     Bring a paper prescription for each of these medications     clonazePAM 1 MG tablet                Primary Care Provider Office Phone # Fax #    Flaquita Starr [phone]       [facility] [address] [place] [address]        Equal Access to Services     [facility] : Hadii aad ku hadasho Soomaali, waaxda luqadaha, qaybta kaalmada adeegyada, waxay idiin hayaan adeeg kharash la'maryana . So [facility] [phone].    ATENCIÓN: Si habla español, tiene a rivas disposición servicios gratuitos de asistencia lingüística. [facility] [phone].    We comply with applicable federal civil rights laws and [place] laws. We do not discriminate on the basis of race, color, national origin, age, disability, sex, sexual orientation, or gender identity.            Thank you!     Thank you for choosing [facility]  for your care. Our goal is always to provide you with excellent care. Hearing back from our patients is one way we can continue to improve our services. Please take a few minutes to complete the written survey that you may receive in the mail after your visit with us. Thank you!             Your Updated Medication List - Protect others around you: Learn how to safely use, store and throw away your medicines at www.disposemymeds.org.          This list is accurate as of 10/31/18  9:57 AM.  Always use your most recent med list.                   Brand Name Dispense Instructions for use Diagnosis    clonazePAM 1 MG tablet    klonoPIN    90 tablet    Take one tablet every morning and two tablets at night one hour before bed    Adjustment disorder with anxious mood       PARoxetine 20 MG tablet    PAXIL    90 tablet    Take 1 tablet (20 mg) by mouth every  morning    Adjustment disorder with anxious mood, Lupus anticoagulant syndrome (H)       rivaroxaban ANTICOAGULANT 20 MG Tabs tablet    XARELTO    30 tablet    Take 1 tablet (20 mg) by mouth daily (with dinner)    VTE (venous thromboembolism)       tiZANidine 2 MG tablet    ZANAFLEX     Take 2 mg by mouth

## 2018-11-19 DIAGNOSIS — F43.22 ADJUSTMENT DISORDER WITH ANXIOUS MOOD: ICD-10-CM

## 2018-11-19 NOTE — TELEPHONE ENCOUNTER
Message to physician:     Date of last visit: 10/31/2018     Date of next visit if scheduled: Visit date not found      Last Comprehensive Metabolic Panel:  Sodium   Date Value Ref Range Status   10/02/2017 136.0 133.0 - 144.0 mmol/L Final     Potassium   Date Value Ref Range Status   10/02/2017 4.8 3.4 - 5.3 mmol/L Final     Chloride   Date Value Ref Range Status   10/02/2017 105.0 94.0 - 109.0 mmol/L Final     Carbon Dioxide   Date Value Ref Range Status   10/02/2017 27.0 20.0 - 32.0 mmol/L Final     Glucose   Date Value Ref Range Status   10/02/2017 90.0 60.0 - 109.0 mg/dL Final     Urea Nitrogen   Date Value Ref Range Status   10/02/2017 10.0 5.0 - 24.0 mg/dL Final     Creatinine   Date Value Ref Range Status   10/02/2017 1.0 0.6 - 1.3 mg/dL Final     GFR Estimate   Date Value Ref Range Status   05/31/2016 >60 >60 mL/min/1.73m2 Final     Calcium   Date Value Ref Range Status   10/02/2017 9.5 8.5 - 10.4 mg/dL Final       BP Readings from Last 3 Encounters:   10/31/18 132/88   10/03/18 129/85   08/29/18 127/85       No results found for: A1C             Please complete refill and CLOSE ENCOUNTER.  Closing the encounter signifies the refill is complete.

## 2018-11-26 NOTE — TELEPHONE ENCOUNTER
"\"Can you call patient for me please?  Patient was supposed to have appointment with psychiatry.  They should be managing any further refills klonopin.  If psych referral has not been completed we need to see how we can help her move forward with this     Thanks (Routing comment) \"    Called patient per 's request. Patient did not answer, left VM to call clinic back. Annia ROSARIO  "

## 2018-11-29 RX ORDER — CLONAZEPAM 1 MG/1
TABLET ORAL
Qty: 90 TABLET | OUTPATIENT
Start: 2018-11-29

## 2018-12-18 ENCOUNTER — OFFICE VISIT (OUTPATIENT)
Dept: FAMILY MEDICINE | Facility: CLINIC | Age: 33
End: 2018-12-18
Payer: COMMERCIAL

## 2018-12-18 VITALS
DIASTOLIC BLOOD PRESSURE: 88 MMHG | TEMPERATURE: 98.6 F | SYSTOLIC BLOOD PRESSURE: 126 MMHG | BODY MASS INDEX: 28.62 KG/M2 | HEART RATE: 74 BPM | RESPIRATION RATE: 20 BRPM | WEIGHT: 151.6 LBS | OXYGEN SATURATION: 98 % | HEIGHT: 61 IN

## 2018-12-18 DIAGNOSIS — F43.22 ADJUSTMENT DISORDER WITH ANXIOUS MOOD: ICD-10-CM

## 2018-12-18 DIAGNOSIS — I82.90 VTE (VENOUS THROMBOEMBOLISM): ICD-10-CM

## 2018-12-18 RX ORDER — CLONAZEPAM 1 MG/1
TABLET ORAL
Qty: 90 TABLET | Refills: 0 | Status: SHIPPED | OUTPATIENT
Start: 2018-12-18 | End: 2018-12-18

## 2018-12-18 RX ORDER — CLONAZEPAM 1 MG/1
TABLET ORAL
Qty: 90 TABLET | Refills: 0 | Status: SHIPPED | OUTPATIENT
Start: 2019-01-18 | End: 2019-06-21

## 2018-12-18 ASSESSMENT — MIFFLIN-ST. JEOR: SCORE: 1325.03

## 2018-12-18 NOTE — PROGRESS NOTES
HPI:       Lisandra Arauz is a 33 year old  female who presents to address the following concerns:    Chief Complaint   Patient presents with     Refill Request     Refill - clonazepam - pt sees Jessica -1/28     This is a patient well known to myself with PMH significant for [place], PTSD, lupus anticoagulant disorder.  Patient presents to request bridging prescription Has visit scheduled with Jessica and assoc 1/28 for management of above conditions.  Patient has been using benzodiazapines for years to help with sx anxiety.  Formerly on lorazepam and switched by this clinic to clonazepam.              PMHX:     Patient Active Problem List   Diagnosis     Lupus anticoagulant syndrome (H)     Intractable chronic migraine without aura     Gastroesophageal reflux disease     Chronic pain     Dysfunctional uterine bleeding     Generalized anxiety disorder     Obesity (BMI 30.0-34.9)     Restless legs syndrome     Cervical intraepithelial neoplasia grade III with severe dysplasia     Tobacco use disorder     Pap smear for cervical cancer screening     PTSD (post-traumatic stress disorder)       Current Outpatient Medications   Medication Sig Dispense Refill     clonazePAM (KLONOPIN) 1 MG tablet Take one tablet every morning and two tablets at night one hour before bed 90 tablet 0     PARoxetine (PAXIL) 20 MG tablet Take 1 tablet (20 mg) by mouth every morning 90 tablet 3     rivaroxaban ANTICOAGULANT (XARELTO) 20 MG TABS tablet Take 1 tablet (20 mg) by mouth daily (with dinner) 30 tablet 11     tiZANidine (ZANAFLEX) 2 MG tablet Take 2 mg by mouth            Allergies   Allergen Reactions     Hydrocodone-Acetaminophen Anaphylaxis     Phenothiazines      Other reaction(s): Wheezing     Prochlorperazine Anaphylaxis, Other (See Comments) and Swelling     Gabapentin Other (See Comments)     Nausea and vomiting, anxiety     Oxycodone-Acetaminophen Other (See Comments)     itching     Tramadol Other (See Comments)     Mood  "swings, anger.        No results found for this or any previous visit (from the past 24 hour(s)).    Current Outpatient Medications   Medication     clonazePAM (KLONOPIN) 1 MG tablet     PARoxetine (PAXIL) 20 MG tablet     rivaroxaban ANTICOAGULANT (XARELTO) 20 MG TABS tablet     tiZANidine (ZANAFLEX) 2 MG tablet     No current facility-administered medications for this visit.               Review of Systems:   ROS as described above.  Denies F/S/C/N/V/SOB/CP          Physical Exam:     Vitals:    12/18/18 1139   BP: 126/88   Pulse: 74   Resp: 20   Temp: 98.6  F (37  C)   TempSrc: Oral   SpO2: 98%   Weight: 68.8 kg (151 lb 9.6 oz)   Height: 1.549 m (5' 1\")     Body mass index is 28.64 kg/m .    GEN: patient sitting in chair.  Son jarrod present.  Mildly anxious  HEEN: Head is atraumatic, normocephalic, eyes anicteric, mucous membranes moist  CV: RRR w/o M/R/G  PULM: CTAB without w/r/r  ABD: soft, nontender, bowel sounds present  NEURO: Alert and oriented x3.  No focal motor abnormalities.  Face symmetric.  PSYCH: appropriate  SKIN: No rashes, bruising, or other lesions    Results for orders placed or performed in visit on 08/29/18   INR ([facility])   Result Value Ref Range    INR 2.1        Assessment and Plan     1. Adjustment disorder with anxious mood-plan is to transition to psych management in the new year.  rx to bridge patient.   - clonazePAM (KLONOPIN) 1 MG tablet; Take one tablet every morning and two tablets at night one hour before bed  Dispense: 90 tablet; Refill: 0    2. VTE (venous thromboembolism)-will continue managing anticoagulation.  Due for refill.  Patient with known lupus anticoagulant disorder  - rivaroxaban ANTICOAGULANT (XARELTO) 20 MG TABS tablet; Take 1 tablet (20 mg) by mouth daily (with dinner)  Dispense: 30 tablet; Refill: 11      Options for treatment and follow-up care were reviewed with the patient and/or guardian. Lisandra Arauz and/or guardian engaged in the decision making process and " verbalized understanding of the options discussed and agreed with the final plan.    Flaquita Starr MD

## 2019-06-21 ENCOUNTER — OFFICE VISIT (OUTPATIENT)
Dept: FAMILY MEDICINE | Facility: CLINIC | Age: 34
End: 2019-06-21
Payer: MEDICARE

## 2019-06-21 VITALS
SYSTOLIC BLOOD PRESSURE: 136 MMHG | RESPIRATION RATE: 20 BRPM | TEMPERATURE: 98.1 F | HEIGHT: 61 IN | DIASTOLIC BLOOD PRESSURE: 95 MMHG | WEIGHT: 164 LBS | BODY MASS INDEX: 30.96 KG/M2

## 2019-06-21 DIAGNOSIS — I82.90 VTE (VENOUS THROMBOEMBOLISM): ICD-10-CM

## 2019-06-21 DIAGNOSIS — F43.22 ADJUSTMENT DISORDER WITH ANXIOUS MOOD: Primary | ICD-10-CM

## 2019-06-21 DIAGNOSIS — G89.29 OTHER CHRONIC PAIN: ICD-10-CM

## 2019-06-21 DIAGNOSIS — K21.00 GASTROESOPHAGEAL REFLUX DISEASE WITH ESOPHAGITIS: ICD-10-CM

## 2019-06-21 RX ORDER — CLONAZEPAM 1 MG/1
TABLET ORAL
Qty: 90 TABLET | Refills: 0 | Status: SHIPPED | OUTPATIENT
Start: 2019-06-21 | End: 2021-08-04

## 2019-06-21 RX ORDER — PAROXETINE 20 MG/1
20 TABLET, FILM COATED ORAL EVERY MORNING
Qty: 90 TABLET | Refills: 3 | Status: SHIPPED | OUTPATIENT
Start: 2019-06-21 | End: 2019-07-29

## 2019-06-21 ASSESSMENT — MIFFLIN-ST. JEOR: SCORE: 1382.89

## 2019-06-21 NOTE — PROGRESS NOTES
Assessment and Plan     1. Adjustment disorder with anxious mood  Restart meds. Advised the patient that her PCP didn't want us to be continuing her benzos and that she will need to get further refills figured out with psych.  - PARoxetine (PAXIL) 20 MG tablet; Take 1 tablet (20 mg) by mouth every morning  Dispense: 90 tablet; Refill: 3  - clonazePAM (KLONOPIN) 1 MG tablet; Take one tablet every morning and two tablets at night one hour before bed  Dispense: 90 tablet; Refill: 0    2. VTE (venous thromboembolism)  - rivaroxaban ANTICOAGULANT (XARELTO) 20 MG TABS tablet; Take 1 tablet (20 mg) by mouth daily (with dinner)  Dispense: 30 tablet; Refill: 11    3. Gastroesophageal reflux disease with esophagitis  Recheck in a month. No red flag symptoms.  - ranitidine (ZANTAC) 150 MG tablet; Take 1 tablet (150 mg) by mouth 2 times daily  Dispense: 60 tablet; Refill: 1    4. Other chronic pain  Don't have all the needed information to complete the referral and patient had already left the clinic. Plan for the patient to complete at a future visit with PCP.    1 month f/u booked for patient with PCP before she left clinic.    Options for treatment and follow-up care were reviewed with the patient and/or guardian. Lisandra Arauz and/or guardian engaged in the decision making process and verbalized understanding of the options discussed and agreed with the final plan. I answered all of their questions.    Gerard Gooden III, MD, FAAFP  [facility]  06/21/19 2:58 PM           HPI:       Lisandra Arauz is a 34 year old  female  Patient presents with:  Eating Disorder  Refill Request  Medication Reconciliation  Referral: Body aches    Still having problems with her insurance. Has a bill from psych and they won't see her again until it is figured out.    Has been rationing her Paxil because she is almost out. Has not been sleeping well. Was getting body aches (slightly better today). Is out of her benzos.  Denies SI/HI.    Has some stomach pains, similar to when she had acid reflux before. Had previously had an EGD that showed reflux. Hasn't been on meds for awhile per her but she names some PPIs and has them in her previous med list. Denies hematemesis and BRBPR/melena. Has nausea.    Leaving for [place] for 3 weeks soon. Something about a relative transitioning to hospice?    Smoking marijuana some to help.    Would like to go to a different pain management clinic and wants a new referral.         PMHX:     Patient Active Problem List   Diagnosis     Lupus anticoagulant syndrome (H)     Intractable chronic migraine without aura     Gastroesophageal reflux disease     Chronic pain     Dysfunctional uterine bleeding     Generalized anxiety disorder     Obesity (BMI 30.0-34.9)     Restless legs syndrome     Cervical intraepithelial neoplasia grade III with severe dysplasia     Tobacco use disorder     Pap smear for cervical cancer screening     PTSD (post-traumatic stress disorder)       Current Outpatient Medications   Medication Sig Dispense Refill     clonazePAM (KLONOPIN) 1 MG tablet Take one tablet every morning and two tablets at night one hour before bed 90 tablet 0     PARoxetine (PAXIL) 20 MG tablet Take 1 tablet (20 mg) by mouth every morning 90 tablet 3     ranitidine (ZANTAC) 150 MG tablet Take 1 tablet (150 mg) by mouth 2 times daily 60 tablet 1     rivaroxaban ANTICOAGULANT (XARELTO) 20 MG TABS tablet Take 1 tablet (20 mg) by mouth daily (with dinner) 30 tablet 11       Social History     Socioeconomic History     Marital status: Single     Spouse name: Not on file     Number of children: Not on file     Years of education: Not on file     Highest education level: Not on file   Occupational History     Not on file   Social Needs     Financial resource strain: Not on file     Food insecurity:     Worry: Not on file     Inability: Not on file     Transportation needs:     Medical: Not on file     Non-medical:  "Not on file   Tobacco Use     Smoking status: Current Every Day Smoker     Types: Cigarettes     Smokeless tobacco: Never Used     Tobacco comment: about 6 per day   Substance and Sexual Activity     Alcohol use: No     Alcohol/week: 0.0 oz     Drug use: No     Types: Marijuana     Sexual activity: Not on file   Lifestyle     Physical activity:     Days per week: Not on file     Minutes per session: Not on file     Stress: Not on file   Relationships     Social connections:     Talks on phone: Not on file     Gets together: Not on file     Attends Yazidism service: Not on file     Active member of club or organization: Not on file     Attends meetings of clubs or organizations: Not on file     Relationship status: Not on file     Intimate partner violence:     Fear of current or ex partner: Not on file     Emotionally abused: Not on file     Physically abused: Not on file     Forced sexual activity: Not on file   Other Topics Concern     Parent/sibling w/ CABG, MI or angioplasty before 65F 55M? Not Asked   Social History Narrative     Not on file       Allergies   Allergen Reactions     Hydrocodone-Acetaminophen Anaphylaxis     Phenothiazines      Other reaction(s): Wheezing     Prochlorperazine Anaphylaxis, Other (See Comments) and Swelling     Gabapentin Other (See Comments)     Nausea and vomiting, anxiety     Oxycodone-Acetaminophen Other (See Comments)     itching     Tramadol Other (See Comments)     Mood swings, anger.        No results found for this or any previous visit (from the past 24 hour(s)).         Review of Systems:     Review of Systems   All other systems reviewed and are negative.           Physical Exam:     Vitals:    06/21/19 0953   BP: (!) 136/95   Resp: 20   Temp: 98.1  F (36.7  C)   TempSrc: Oral   Weight: 74.4 kg (164 lb)   Height: 1.56 m (5' 1.42\")     Body mass index is 30.57 kg/m .    Physical Exam   Constitutional: She is oriented to person, place, and time. She appears " well-developed and well-nourished.  Non-toxic appearance. She does not have a sickly appearance. No distress.   Pulmonary/Chest: No respiratory distress.   Neurological: She is alert and oriented to person, place, and time.   Skin: She is not diaphoretic.   Nursing note and vitals reviewed.

## 2019-07-22 ENCOUNTER — TELEPHONE (OUTPATIENT)
Dept: FAMILY MEDICINE | Facility: CLINIC | Age: 34
End: 2019-07-22

## 2019-07-22 NOTE — TELEPHONE ENCOUNTER
[facility] phone call message - order or referral request for patient:     Order or referral being requested: [facility]    Additional Comments: Patient states she has been unable to schedule her appointment at [facility] due to referral needing more information. Please and advise.     OK to leave a message on voice mail? Yes    Primary language: English      needed? No    Call taken on July 22, 2019 at 11:24 AM by Charmaine Rivera

## 2019-07-22 NOTE — TELEPHONE ENCOUNTER
A referral needs to be entered for the patient and signed by the ordering provider either electronically or handwritten. Last seen by Dr. Gooden in June 2019 for this particular referral.

## 2019-07-23 NOTE — TELEPHONE ENCOUNTER
Patient is upset referral has not been sent yet. She states she was told only a signature was needed then was told that more informations needed. Patient states she is in pain and needs to schedule this appointment. She states if something doesn't get done soon then she will switch clinics. Please call and advise.

## 2019-07-25 NOTE — TELEPHONE ENCOUNTER
Unfortunately her referral was not as simple as we thought. There were a whole host of questions required to complete the referral that were not readily available from the record. This is unique to the clinic that she was hoping to go to and was not known to be needed before she left clinic. We tried to call and inform you but I cannot verify that this happened. Plan is to have you try to complete this during your follow up with your PCP next week. Please know that it might be time consuming and that it could limit the number of things that can be done in you appointment.    Gerard Gooden III, MD, FAAFP  [facility]  07/25/19 3:54 PM

## 2019-07-25 NOTE — TELEPHONE ENCOUNTER
Pt returned call and was notified of the issues with the referral. Will be here on Monday to see Dr Starr.       Needs refill of Klonipin, pt is having troubles getting in to see her Mental health provider due to insurance. Can not be seen and can not get refills for her mental health med's.. Having to pay out of pocket if she wants to be seen.

## 2019-07-29 ENCOUNTER — OFFICE VISIT (OUTPATIENT)
Dept: FAMILY MEDICINE | Facility: CLINIC | Age: 34
End: 2019-07-29
Payer: MEDICARE

## 2019-07-29 ENCOUNTER — AMBULATORY - HEALTHEAST (OUTPATIENT)
Dept: ADMINISTRATIVE | Facility: REHABILITATION | Age: 34
End: 2019-07-29

## 2019-07-29 VITALS
HEIGHT: 61 IN | SYSTOLIC BLOOD PRESSURE: 133 MMHG | OXYGEN SATURATION: 100 % | WEIGHT: 159.8 LBS | BODY MASS INDEX: 30.17 KG/M2 | HEART RATE: 91 BPM | DIASTOLIC BLOOD PRESSURE: 80 MMHG | RESPIRATION RATE: 18 BRPM | TEMPERATURE: 97.9 F

## 2019-07-29 DIAGNOSIS — M25.511 CHRONIC RIGHT SHOULDER PAIN: ICD-10-CM
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
## 2019-07-29 DIAGNOSIS — G89.29 CHRONIC RIGHT SHOULDER PAIN: ICD-10-CM

## 2019-07-29 DIAGNOSIS — F43.22 ADJUSTMENT DISORDER WITH ANXIOUS MOOD: ICD-10-CM

## 2019-07-29 DIAGNOSIS — F41.9 ANXIETY: Primary | ICD-10-CM

## 2019-07-29 LAB
AMPHETAMINES QUAL: NEGATIVE
BARBITURATES QUAL URINE: NEGATIVE
BENZODIAZEPINE QUAL URINE: POSITIVE
BUPRENORPHINE QUAL URINE: NEGATIVE
CANNABINOIDS UR QL SCN: POSITIVE
COCAINE QUAL URINE: NEGATIVE
METHAMPHETAMINE: NEGATIVE
METHODONE QUAL: NEGATIVE
MORPHINE QUAL: NEGATIVE
OXYCODONE QUAL: POSITIVE
PHENCYCLIDINE: NEGATIVE
PROPOXYPHENE: NEGATIVE
TEMPERATURE OF URINE WAS BETWEEN 90-100 DEGREES F: YES
TRICYCLIC ANTIDEPRESSANTS: NEGATIVE

## 2019-07-29 RX ORDER — BUSPIRONE HYDROCHLORIDE 5 MG/1
5 TABLET ORAL 3 TIMES DAILY
Qty: 30 TABLET | Refills: 0 | Status: SHIPPED | OUTPATIENT
Start: 2019-07-29 | End: 2021-08-04

## 2019-07-29 RX ORDER — CLONAZEPAM 0.5 MG/1
TABLET ORAL
Qty: 10 TABLET | Refills: 0 | Status: SHIPPED | OUTPATIENT
Start: 2019-07-29 | End: 2019-08-05

## 2019-07-29 RX ORDER — PAROXETINE 30 MG/1
30 TABLET, FILM COATED ORAL EVERY MORNING
Qty: 30 TABLET | Refills: 0 | Status: SHIPPED | OUTPATIENT
Start: 2019-07-29 | End: 2019-08-29

## 2019-07-29 ASSESSMENT — PATIENT HEALTH QUESTIONNAIRE - PHQ9: SUM OF ALL RESPONSES TO PHQ QUESTIONS 1-9: 10

## 2019-07-29 ASSESSMENT — MIFFLIN-ST. JEOR: SCORE: 1349.29

## 2019-07-29 NOTE — PROGRESS NOTES
HPI:       Lisandra Arauz is a 34 year old  female who presents to address the following concerns:    Anxiety:  -issue with psych referral reports that she went to [facility]. But that she got a bill and that she cannot afford it.    -requesting refills of benzodiazapine today  -continues Paxil    Chronic pain:  -desires referral to a pain clinic for chronic pain  -wanted a pain referral because of chronic pain in various areas of her body  -hip was locking up prior to this, then right shoulder was hurting    Back pain:  -missed a few steps 2 months ago and landed on one foot  -all weight landed on her right foot  -back pain in the back ever since then  -also reporting pain in the shoulder    Lupus diagnosis 10 years ago. Has not seen rheumatologist recently.  No known active issues with Lupus.     Can't be seen by [facility] because of insurance issues.  Did see once.  And I have those records.  Prazosin was recommended but patient reports that she could not tolerate.    Other medications patient has not been able to tolerate or that were not beneficial to patient in the past include: seroquel, trazadone (restless legs), seroquel (not beneficial), opiates with tylenol (thought to cause anaphylaxis), gabapentin (not tolerated unsure why)            PMHX:     Patient Active Problem List   Diagnosis     Lupus anticoagulant syndrome (H)     Intractable chronic migraine without aura     Gastroesophageal reflux disease     Chronic pain     Dysfunctional uterine bleeding     Generalized anxiety disorder     Obesity (BMI 30.0-34.9)     Restless legs syndrome     Cervical intraepithelial neoplasia grade III with severe dysplasia     Tobacco use disorder     Pap smear for cervical cancer screening     PTSD (post-traumatic stress disorder)       Current Outpatient Medications   Medication Sig Dispense Refill     clonazePAM (KLONOPIN) 1 MG tablet Take one tablet every morning and two tablets at night one hour  "before bed 90 tablet 0     PARoxetine (PAXIL) 20 MG tablet Take 1 tablet (20 mg) by mouth every morning 90 tablet 3     ranitidine (ZANTAC) 150 MG tablet Take 1 tablet (150 mg) by mouth 2 times daily 60 tablet 1     rivaroxaban ANTICOAGULANT (XARELTO) 20 MG TABS tablet Take 1 tablet (20 mg) by mouth daily (with dinner) 30 tablet 11          Allergies   Allergen Reactions     Hydrocodone-Acetaminophen Anaphylaxis     Phenothiazines      Other reaction(s): Wheezing     Prochlorperazine Anaphylaxis, Other (See Comments) and Swelling     Gabapentin Other (See Comments)     Nausea and vomiting, anxiety     Oxycodone-Acetaminophen Other (See Comments)     itching     Tramadol Other (See Comments)     Mood swings, anger.        No results found for this or any previous visit (from the past 24 hour(s)).    Current Outpatient Medications   Medication     clonazePAM (KLONOPIN) 1 MG tablet     PARoxetine (PAXIL) 20 MG tablet     ranitidine (ZANTAC) 150 MG tablet     rivaroxaban ANTICOAGULANT (XARELTO) 20 MG TABS tablet     No current facility-administered medications for this visit.               Review of Systems:   ROS as described above.  Denies F/S/C/N/V/SOB/CP          Physical Exam:     There were no vitals filed for this visit.  /80   Pulse 91   Temp 97.9  F (36.6  C)   Resp 18   Ht 1.537 m (5' 0.5\")   Wt 72.5 kg (159 lb 12.8 oz)   SpO2 100%   BMI 30.70 kg/m      GEN: anxious.  Son present  HEEN: Head is atraumatic, normocephalic, eyes anicteric, mucous membranes moist  CV: RRR w/o M/R/G  PULM: CTAB without w/r/r  ABD: soft, nontender, bowel sounds present  NEURO: Alert and oriented x3.  No focal motor abnormalities.  Face symmetric.  PSYCH: anxious appearing.  Mild distress  SKIN: No rashes, bruising, or other lesions    Results for orders placed or performed in visit on 07/29/19   Rapid Urine Drug Screen ([facility])   Result Value Ref Range    Phencyclidine NEGATIVE NEGATIVE    Propoxyphene NEGATIVE NEGATIVE "    Tricyclic Antidepressants NEGATIVE NEGATIVE    Amphetamines Qual NEGATIVE NEGATIVE    Barbiturates Qual Urine NEGATIVE NEGATIVE    Buprenorphine Qual Urine NEGATIVE NEGATIVE    Benzodiazepine Qual Urine POSITIVE (A) NEGATIVE    Cocaine Qual Urine NEGATIVE NEGATIVE    Cannabinoids Qual Urine POSITIVE (A) NEGATIVE    Methamphetamine Qual NEGATIVE NEGATIVE    Methadone Qual NEGATIVE NEGATIVE    Morphine Qual NEGATIVE NEGATIVE    Oxycodone Qual POSITIVE (A) NEGATIVE    Temperature of Urine was Between  Degrees F YES YES         Assessment and Plan     1. Anxiety-patient taking opiates as well as marajuana.  Opiates not prescribed to her.  Discussed that we cannot safely continue benzodiazapines at this time.  Provided a taper and will try buspar.  Patient in great need for psychiatric services.  Significant disease burdeon and inability tolerate several medications  - Rapid Urine Drug Screen ([facility])  - busPIRone (BUSPAR) 5 MG tablet; Take 1 tablet (5 mg) by mouth 3 times daily  Dispense: 30 tablet; Refill: 0  - clonazePAM (KLONOPIN) 0.5 MG tablet; Take 1 tablet (0.5 mg) by mouth 2 times daily for 3 days, THEN 0.5 tablets (0.25 mg) 2 times daily for 4 days.  Dispense: 10 tablet; Refill: 0    2. Fall, initial encounter-normal spine film  - XR Lumbar Spine 2-3 Views*    3. Chronic right shoulder pain  - ORTHOPEDICS ADULT REFERRAL; Future  - PHYSICAL THERAPY REFERRAL; Future    4. Adjustment disorder with anxious mood  - PARoxetine (PAXIL) 30 MG tablet; Take 1 tablet (30 mg) by mouth every morning  Dispense: 30 tablet; Refill: 0         Options for treatment and follow-up care were reviewed with the patient and/or guardian. Lisandra Arauz and/or guardian engaged in the decision making process and verbalized understanding of the options discussed and agreed with the final plan.    Flaquita Starr MD

## 2019-07-29 NOTE — PATIENT INSTRUCTIONS
I have prescribed a rapid taper for your klonopin.  It is not safe to take klonopin with your current strategy to treat your pain.    Take clonopin for 1 week taper then start your buspar  I have also increased your paxil    I have referred you to physical therapy for your back and shoulder    Referral for :     Physical Therapy    LOCATION/PLACE/Provider :    [facility]   DATE & TIME :    location will call patient  PHONE :     [phone]  FAX :    [phone]  Appointment made by clinic staff/:    Kari    Referral for :     Orthopedics   LOCATION/PLACE/Provider :    [facility]  DATE & TIME :    Aug. 22nd at 2:30   PHONE :     [phone]  FAX :    [phone]  Appointment made by clinic staff/:    Kari

## 2019-07-30 NOTE — TELEPHONE ENCOUNTER
Patient called and states that she would like to go to [facility] for pain management. Would like to just go to talk to them and figure things out. Wondering if you would put in a referral for her to do so. Please call and advise.

## 2019-08-27 DIAGNOSIS — F43.22 ADJUSTMENT DISORDER WITH ANXIOUS MOOD: ICD-10-CM

## 2019-08-29 RX ORDER — PAROXETINE 30 MG/1
30 TABLET, FILM COATED ORAL EVERY MORNING
Qty: 30 TABLET | Refills: 0 | Status: SHIPPED | OUTPATIENT
Start: 2019-08-29 | End: 2020-02-11

## 2020-02-04 ENCOUNTER — TELEPHONE (OUTPATIENT)
Dept: FAMILY MEDICINE | Facility: CLINIC | Age: 35
End: 2020-02-04

## 2020-02-04 DIAGNOSIS — I82.90 VTE (VENOUS THROMBOEMBOLISM): Primary | ICD-10-CM

## 2020-02-04 NOTE — TELEPHONE ENCOUNTER
Being patient is out of Xarelto, will route to Urgent Task MD Dr Terry to prescribe Eliquis, alternative to see if cost is less than $422.00.  Which is the deductible for Xarelto. Thank you. Jose Alfredo ROSARIO

## 2020-02-04 NOTE — TELEPHONE ENCOUNTER
Prior Authorization needed on:  Xarelto    Medication:  Xarelto Dose:      Pharmacy confirmed as   [id number] - [place], [place] - [address]  [address]  [place] [address]  Phone: [phone] Fax: [phone]  : Yes    Insurance Name:  [employer]  Insurance Phone: [phone]  Insurance Patient ID: [id number]    Alternatives Suggested:  Eliquis,per pharmacist unsure if this would be lesser cost. Xarelto is covered on formulary but cost is of higher deductible due to start of new year.    Melina Kent RN February 4, 2020 at 3:45 PM

## 2020-02-04 NOTE — TELEPHONE ENCOUNTER
[facility] phone call message- medication clarification/question:    Full Medication Name: XARELTO   Dose:     Question: Patient states she needs a refill for the medication listed above. She states insurance may need a prior authorization form filled out in order for them to cover cost of this medication. Patient states she is completely out of the medication Xarelto and has been taking the medication Coumadin to help with blood clots. Patient states if she does not have this medication than she could die of a blood clot. I notified patient an appointment may be needed and appointment was scheduled on 2/10/20 with Dr. Starr. Please call and advise, Patient states she would like a call back. Notified it may take 2 business days for a response.         Pharmacy confirmed as [facility] #[id number] [place] [id number] AT [address]: Yes    OK to leave a message on voice mail? Yes    Primary language: English      needed? No    Call taken on February 4, 2020 at 3:10 PM by Charmaine Rivera

## 2020-02-04 NOTE — TELEPHONE ENCOUNTER
Prescription sent. Unlikely that the other DOACs will be cheaper if Xarelto is listed as covered - the deductible will be high for all the DOACs.     1. VTE (venous thromboembolism)  - apixaban ANTICOAGULANT (ELIQUIS) 2.5 MG tablet; Take 1 tablet (2.5 mg) by mouth 2 times daily  Dispense: 60 tablet; Refill: 0

## 2020-02-11 DIAGNOSIS — F43.22 ADJUSTMENT DISORDER WITH ANXIOUS MOOD: ICD-10-CM

## 2020-02-13 RX ORDER — PAROXETINE 30 MG/1
30 TABLET, FILM COATED ORAL EVERY MORNING
Qty: 30 TABLET | Refills: 1 | Status: SHIPPED | OUTPATIENT
Start: 2020-02-13 | End: 2020-12-01

## 2020-02-19 ENCOUNTER — TELEPHONE (OUTPATIENT)
Dept: FAMILY MEDICINE | Facility: CLINIC | Age: 35
End: 2020-02-19

## 2020-02-19 DIAGNOSIS — D68.62 LUPUS ANTICOAGULANT SYNDROME (H): Primary | ICD-10-CM

## 2020-02-19 NOTE — TELEPHONE ENCOUNTER
Concern for lack of anticoagulation given cost of DOAC.    Recommend starting Warfarin 5 mg daily based on history. Will need INR recheck no later than 5 days after starting. Will wait to order prescription until seen in clinic.     Need venous INR to establish new baseline. Order placed.     Recommend office visit tomorrow PM if possible, otherwise will be managed by RNs / preceptor.     Nilam Gant, PharmD, CDE  [facility]  Phone: [phone]  February 19, 2020 at 3:53 PM

## 2020-02-19 NOTE — TELEPHONE ENCOUNTER
Called patient and advised of need for lab only visit tomorrow for venous INR draw. Patient scheduled for 2:30 appointment tomorrow. Will dose at that time. Annia ROSARIO

## 2020-02-19 NOTE — TELEPHONE ENCOUNTER
[facility] phone call message- patient requesting a refill:    Full Medication Name: Coumadin    Dose: 2.5 or 5 MG    Pharmacy confirmed as   [facility] #[id number] - [place], [place] - [address]  [address]  [place] [address]  Phone: [phone] Fax: [phone]  : Yes    Additional Comments: Patient wants to request for medication on blood thinner. Patient state due to insurance the other medication is much more expensive. Patient would like to request for a nurse to call back if medication is not approve so she can further explain why she is needing the medication     OK to leave a message on voice mail? Yes      Primary language: English      needed? No    Call taken on February 19, 2020 at 2:28 PM by Arnel Hong

## 2020-02-19 NOTE — TELEPHONE ENCOUNTER
Called patient to advise of need for appointment for INR and dosing to start warfarin therapy along with possible bridging. Patient stated she is unable to come in for an appointment, requested lab only appointment for INR check. Noted patient has used warfarin in the past. Will route to PCP for review and further advisement, patient not willing to come in for an appointment and requested to route to  or  if PCP unable to address today r/t being out of medication. Advised it would require an appointment if unable to wait. Patient stated she will not come in, was firm in not wanting to come in for an office visit.

## 2020-02-20 ENCOUNTER — TELEPHONE (OUTPATIENT)
Dept: PHARMACY | Facility: PHYSICIAN GROUP | Age: 35
End: 2020-02-20

## 2020-02-20 DIAGNOSIS — D68.62 LUPUS ANTICOAGULANT SYNDROME (H): ICD-10-CM

## 2020-02-20 DIAGNOSIS — D68.62 LUPUS ANTICOAGULANT SYNDROME (H): Primary | ICD-10-CM

## 2020-02-20 LAB — INR PPP: 0.98 (ref 0.9–1.1)

## 2020-02-20 RX ORDER — WARFARIN SODIUM 5 MG/1
5 TABLET ORAL DAILY
Qty: 30 TABLET | Refills: 0 | Status: SHIPPED | OUTPATIENT
Start: 2020-02-20 | End: 2020-11-17

## 2020-02-20 NOTE — TELEPHONE ENCOUNTER
Called patient to discuss INR and dosing. Patient confirmed she is no longer taking Xarelto or Eliquis. Advised of warfarin 5mg tablet sent to pharmacy, confirmed pharmacy okay with patient. Advised patient to take 5mg, starting today, daily with recheck next Tuesday (05/25). Patient verbalized understanding with verbal read-back and scheduled lab only appointment. Annia ROSARIO

## 2020-02-20 NOTE — TELEPHONE ENCOUNTER
Patient desiring change to Warfarin due to cost. Baseline INR result obtained.     Indication for therapy: Hypercoagulable State with INR goal: 2.0 - 3.0. Duration of therapy: indefinite    Lab Results   Component Value Date    INR 0.98 02/20/2020    INR 2.1 08/29/2018    INR 1.2 08/23/2018    INR 1.6 05/25/2018    INR 1.2 05/18/2018    INR 1.0 03/12/2018    INR 1.7 08/29/2017    INR 1.5 03/28/2017    INR 1.2 12/13/2016    INR 2.9 08/05/2016    INR 2.90 07/08/2016    INR 1.6 02/29/2016       Using Warfarin Pill size at home: 5 mg (peach), weekly Warfarin dosing as follows:     Sunday Dose: 5mg Monday Dose: 5mg  Tuesday    Recheck INR  Thursday Dose: 5mg Friday Dose: 5mg Saturday Dose: 5mg     Recheck INR: 5 days is recommended. INR Recheck Date: 02/25/20 approximate.    RN to call and inform patient. Please ensure discontinued Xarelto and Eliquis.     Nilam Gant, PharmD, CDE  [facility]  Phone: [phone]  February 20, 2020 at 3:04 PM

## 2020-03-18 DIAGNOSIS — D68.62 LUPUS ANTICOAGULANT SYNDROME (H): ICD-10-CM

## 2020-03-19 RX ORDER — WARFARIN SODIUM 5 MG/1
5 TABLET ORAL DAILY
Qty: 30 TABLET | OUTPATIENT
Start: 2020-03-19

## 2020-04-09 DIAGNOSIS — D68.62 LUPUS ANTICOAGULANT SYNDROME (H): ICD-10-CM

## 2020-04-17 ENCOUNTER — VIRTUAL VISIT (OUTPATIENT)
Dept: FAMILY MEDICINE | Facility: CLINIC | Age: 35
End: 2020-04-17
Payer: MEDICARE

## 2020-04-17 DIAGNOSIS — D68.62 LUPUS ANTICOAGULANT SYNDROME (H): Primary | ICD-10-CM

## 2020-04-17 DIAGNOSIS — F41.9 ANXIETY: ICD-10-CM

## 2020-04-17 RX ORDER — WARFARIN SODIUM 5 MG/1
5 TABLET ORAL DAILY
Qty: 90 TABLET | Refills: 0 | Status: SHIPPED | OUTPATIENT
Start: 2020-04-17 | End: 2020-09-02

## 2020-04-17 RX ORDER — PAROXETINE 30 MG/1
30 TABLET, FILM COATED ORAL EVERY MORNING
Qty: 90 TABLET | Refills: 1 | Status: SHIPPED | OUTPATIENT
Start: 2020-04-17 | End: 2021-08-04

## 2020-04-17 NOTE — PROGRESS NOTES
"Family Medicine Telephone visit  Lisandra is being evaluated via telephone           Video Visit Consent     Patient was verbally read the following and verbal consent was obtained.  \"This video visit will be conducted via a call between you and your physician/provider. We have found that certain health care needs can be provided without the need for an in-person physical exam.  This service lets us provide the care you need with a video conversation.  If a prescription is necessary we can send it directly to your pharmacy.  If lab work is needed we can place an order for that and you can then stop by our lab to have the test done at a later time.    If during the course of the call the physician/provider feels a video visit is not appropriate, you will not be charged for this service.\"     Note: transitioned to Telephone visit.      (Name person giving consent:  Patient   Date verbal consent given:  4/17/2020  Time verbal consent given:  10:21 AM)    Patient would like the video invitation sent by: Text to cell phone: [phone]     Chief Complaint   Patient presents with     Refill Request     paxil 30mg and coumadin 5mg (other is too expensive)      Medication Reconciliation     completed needs attention               HPI     Telephone Start Time: 10:36    Lisandra presents to clinic today for the following health issues:    Needs Paxil refilled/Anxiety  -would like to increase to 30mg  -feeling stressed out with son Guille at home and with  working from   -has been helping Guille with home learning which has been a challenge but has been a good challenge.  -when stressed has been going on walks alone which helps and  is supportive of this  -working to get outside with son daily which has also helped     History of Lupus Anticoaglulant: need to review plan for anticoagulant  Tested in 2010 for Lupus anticoagulant after having a PE.    -+ Family history   -When pregnant with Guille was on heparin. " "  -previously on xarelto but this was too expensive and would like to go back to Warfarin  -INR historically difficult to get in therapeutic range but       works for [employer] staff[employer].   for this.    Continuing to smoke   Current Outpatient Medications   Medication Sig Dispense Refill     busPIRone (BUSPAR) 5 MG tablet Take 1 tablet (5 mg) by mouth 3 times daily 30 tablet 0     clonazePAM (KLONOPIN) 0.5 MG tablet Take 1 tablet (0.5 mg) by mouth 2 times daily for 3 days, THEN 0.5 tablets (0.25 mg) 2 times daily for 4 days. 10 tablet 0     clonazePAM (KLONOPIN) 1 MG tablet Take one tablet every morning and two tablets at night one hour before bed 90 tablet 0     PARoxetine (PAXIL) 30 MG tablet Take 1 tablet (30 mg) by mouth every morning 30 tablet 1     ranitidine (ZANTAC) 150 MG tablet Take 1 tablet (150 mg) by mouth 2 times daily 60 tablet 1     warfarin ANTICOAGULANT 5 MG PO tablet Take 1 tablet (5 mg) by mouth daily 30 tablet 0     Allergies   Allergen Reactions     Hydrocodone-Acetaminophen Anaphylaxis     Phenothiazines      Other reaction(s): Wheezing     Prochlorperazine Anaphylaxis, Other (See Comments) and Swelling     Gabapentin Other (See Comments)     Nausea and vomiting, anxiety     Oxycodone-Acetaminophen Other (See Comments)     itching     Tramadol Other (See Comments)     Mood swings, anger.               Review of Systems:     See above.  No sob, leg swelling.  No chest pain.  Anxiety controlled today         Physical Exam:     There were no vitals taken for this visit.  Estimated body mass index is 30.7 kg/m  as calculated from the following:    Height as of 7/29/19: 1.537 m (5' 0.5\").    Weight as of 7/29/19: 72.5 kg (159 lb 12.8 oz).    GEN: bright and interactive  NEuro: speech clear, normal thought patterns  Psych: appropriate            Assessment and Plan   1. Lupus anticoagulant syndrome (H)-history PE in 2010.  Historic difficulty getting INRs drawn but cannot afford a " DOAC.  Per review, Coumadin is preferred agent if patient can get INR in range.  Patient willing to come in to get INR checked next Monday.  Discussed importance of establishing an effective dose.  No clots since 2010  - warfarin ANTICOAGULANT (COUMADIN) 5 MG tablet; Take 1 tablet (5 mg) by mouth daily  Dispense: 90 tablet; Refill: 0  - INR ([facility]); Future    2. Anxiety-would like to increase dose paxil with recent stress but using good coping mechanisms.  Is not in distress today by phone.  Agree with paxil increase and will monitor effect.   - PARoxetine (PAXIL) 30 MG tablet; Take 1 tablet (30 mg) by mouth every morning  Dispense: 90 tablet; Refill: 1    3. Covid guidance: reviewed guidance for keeping family safe regarding novel corona virus and patient verbalized understanding    Phone visit end time 10:50  Total phone time 14 minutes      Flaquita Starr MD

## 2020-04-21 RX ORDER — WARFARIN SODIUM 5 MG/1
5 TABLET ORAL DAILY
Qty: 30 TABLET | OUTPATIENT
Start: 2020-04-21

## 2020-07-14 DIAGNOSIS — D68.62 LUPUS ANTICOAGULANT SYNDROME (H): ICD-10-CM

## 2020-07-14 DIAGNOSIS — I82.90 VTE (VENOUS THROMBOEMBOLISM): ICD-10-CM

## 2020-07-14 RX ORDER — WARFARIN SODIUM 5 MG/1
5 TABLET ORAL DAILY
Qty: 90 TABLET | Refills: 0 | Status: CANCELLED | OUTPATIENT
Start: 2020-07-14

## 2020-07-14 NOTE — TELEPHONE ENCOUNTER
Message to physician:     Date of last visit: 7/29/2019     Date of next visit if scheduled: Visit date not found       Last Comprehensive Metabolic Panel:  Sodium   Date Value Ref Range Status   10/02/2017 136.0 133.0 - 144.0 mmol/L Final     Potassium   Date Value Ref Range Status   10/02/2017 4.8 3.4 - 5.3 mmol/L Final     Chloride   Date Value Ref Range Status   10/02/2017 105.0 94.0 - 109.0 mmol/L Final     Carbon Dioxide   Date Value Ref Range Status   10/02/2017 27.0 20.0 - 32.0 mmol/L Final     Glucose   Date Value Ref Range Status   10/02/2017 90.0 60.0 - 109.0 mg/dL Final     Urea Nitrogen   Date Value Ref Range Status   10/02/2017 10.0 5.0 - 24.0 mg/dL Final     Creatinine   Date Value Ref Range Status   10/02/2017 1.0 0.6 - 1.3 mg/dL Final     GFR Estimate   Date Value Ref Range Status   05/31/2016 >60 >60 mL/min/1.73m2 Final     Calcium   Date Value Ref Range Status   10/02/2017 9.5 8.5 - 10.4 mg/dL Final       BP Readings from Last 3 Encounters:   07/29/19 133/80   06/21/19 (!) 136/95   12/18/18 126/88       No results found for: A1C             Please complete refill and CLOSE ENCOUNTER.  Closing the encounter signifies the refill is complete.

## 2020-07-14 NOTE — TELEPHONE ENCOUNTER
Please call patient.  It is unsafe to continue this medication without checking INRs.  Patient did not come in for INR check after last refill.  Dr Starr is sending xarelto again and we will work with insurance to see if we can get it covered as an alternative.

## 2020-07-16 NOTE — TELEPHONE ENCOUNTER
Called patient again and left message asking to call back. Closing encounter. If patient wants to start anticoagulant will need an appointment to discuss with Dr Starr. Also Dr Starr is sending xarelto again to her pharmacy and there should be a PA going through to see if insurance should cover it

## 2020-07-27 ENCOUNTER — RECORDS - HEALTHEAST (OUTPATIENT)
Dept: ADMINISTRATIVE | Facility: OTHER | Age: 35
End: 2020-07-27

## 2020-07-27 DIAGNOSIS — D68.62 LUPUS ANTICOAGULANT SYNDROME (H): ICD-10-CM

## 2020-07-27 RX ORDER — WARFARIN SODIUM 5 MG/1
5 TABLET ORAL DAILY
Status: CANCELLED | OUTPATIENT
Start: 2020-07-27

## 2020-07-27 NOTE — TELEPHONE ENCOUNTER
Patient with limited follow up and chronically subtherapeutic with coumadin therapy.      Despite inadequate treatment has not had VTE for years.    Recommend reassessment for need for anticoagulant.

## 2020-08-07 NOTE — TELEPHONE ENCOUNTER
Referral for :     Oncology/Hematology    LOCATION/PLACE/Provider :    [facility] [place]   DATE & TIME :    Referral & documents faxed, location will schedule   PHONE :     [phone]  FAX :    [phone]  Appointment made by clinic staff/:    Kari

## 2020-08-11 ENCOUNTER — COMMUNICATION - HEALTHEAST (OUTPATIENT)
Dept: ADMINISTRATIVE | Facility: HOSPITAL | Age: 35
End: 2020-08-11

## 2020-08-19 ENCOUNTER — COMMUNICATION - HEALTHEAST (OUTPATIENT)
Dept: ADMINISTRATIVE | Facility: HOSPITAL | Age: 35
End: 2020-08-19

## 2020-08-26 ENCOUNTER — COMMUNICATION - HEALTHEAST (OUTPATIENT)
Dept: ADMINISTRATIVE | Facility: HOSPITAL | Age: 35
End: 2020-08-26

## 2020-08-28 ENCOUNTER — COMMUNICATION - HEALTHEAST (OUTPATIENT)
Dept: ADMINISTRATIVE | Facility: HOSPITAL | Age: 35
End: 2020-08-28

## 2020-09-02 DIAGNOSIS — D68.62 LUPUS ANTICOAGULANT SYNDROME (H): ICD-10-CM

## 2020-09-03 NOTE — TELEPHONE ENCOUNTER
Patient state she is out of medication and already going about 2 days without blood thinners. Patient has an appointment on 9/25 with the blood thinner doctor but she wont be able to go into her appointment without her medication. Patient was informed request was put in 9/02 and it can take up to 2 days for response to which patient state she informed pharmacy on Friday when she was running low. Patient still wanting to see if refill can be approve soon

## 2020-09-04 RX ORDER — WARFARIN SODIUM 5 MG/1
5 TABLET ORAL DAILY
Qty: 30 TABLET | Refills: 0 | Status: SHIPPED | OUTPATIENT
Start: 2020-09-04 | End: 2020-10-02

## 2020-09-23 ENCOUNTER — AMBULATORY - HEALTHEAST (OUTPATIENT)
Dept: INFUSION THERAPY | Facility: HOSPITAL | Age: 35
End: 2020-09-23

## 2020-09-23 ENCOUNTER — OFFICE VISIT - HEALTHEAST (OUTPATIENT)
Dept: ONCOLOGY | Facility: HOSPITAL | Age: 35
End: 2020-09-23

## 2020-09-23 DIAGNOSIS — R76.0 LUPUS ANTICOAGULANT POSITIVE: ICD-10-CM

## 2020-09-23 DIAGNOSIS — Z86.711 HISTORY OF PULMONARY EMBOLISM: ICD-10-CM

## 2020-09-23 DIAGNOSIS — R79.1 ABNORMAL COAGULATION PROFILE: ICD-10-CM

## 2020-09-23 DIAGNOSIS — I27.82 OTHER CHRONIC PULMONARY EMBOLISM WITHOUT ACUTE COR PULMONALE (H): ICD-10-CM

## 2020-09-23 DIAGNOSIS — M79.10 MYALGIA: ICD-10-CM

## 2020-09-23 DIAGNOSIS — R21 RASH AND NONSPECIFIC SKIN ERUPTION: ICD-10-CM

## 2020-09-23 LAB
ALBUMIN SERPL-MCNC: 4.1 G/DL (ref 3.5–5)
ALP SERPL-CCNC: 61 U/L (ref 45–120)
ALT SERPL W P-5'-P-CCNC: 24 U/L (ref 0–45)
ANION GAP SERPL CALCULATED.3IONS-SCNC: 8 MMOL/L (ref 5–18)
APTT PPP: 35 SECONDS (ref 24–37)
AST SERPL W P-5'-P-CCNC: 30 U/L (ref 0–40)
BASOPHILS # BLD AUTO: 0 THOU/UL (ref 0–0.2)
BASOPHILS NFR BLD AUTO: 0 % (ref 0–2)
BILIRUB SERPL-MCNC: 0.2 MG/DL (ref 0–1)
BUN SERPL-MCNC: 9 MG/DL (ref 8–22)
C REACTIVE PROTEIN LHE: 0.3 MG/DL (ref 0–0.8)
CALCIUM SERPL-MCNC: 9.3 MG/DL (ref 8.5–10.5)
CHLORIDE BLD-SCNC: 104 MMOL/L (ref 98–107)
CO2 SERPL-SCNC: 26 MMOL/L (ref 22–31)
CREAT SERPL-MCNC: 0.85 MG/DL (ref 0.6–1.1)
EOSINOPHIL # BLD AUTO: 0.1 THOU/UL (ref 0–0.4)
EOSINOPHIL NFR BLD AUTO: 1 % (ref 0–6)
ERYTHROCYTE [DISTWIDTH] IN BLOOD BY AUTOMATED COUNT: 13.5 % (ref 11–14.5)
ERYTHROCYTE [SEDIMENTATION RATE] IN BLOOD BY WESTERGREN METHOD: 15 MM/HR (ref 0–20)
GFR SERPL CREATININE-BSD FRML MDRD: >60 ML/MIN/1.73M2
GLUCOSE BLD-MCNC: 93 MG/DL (ref 70–125)
HCT VFR BLD AUTO: 43 % (ref 35–47)
HGB BLD-MCNC: 14.1 G/DL (ref 12–16)
IMM GRANULOCYTES # BLD: 0 THOU/UL
IMM GRANULOCYTES NFR BLD: 0 %
INR PPP: 1.51 (ref 0.9–1.1)
LYMPHOCYTES # BLD AUTO: 1.9 THOU/UL (ref 0.8–4.4)
LYMPHOCYTES NFR BLD AUTO: 25 % (ref 20–40)
MCH RBC QN AUTO: 29.1 PG (ref 27–34)
MCHC RBC AUTO-ENTMCNC: 32.8 G/DL (ref 32–36)
MCV RBC AUTO: 89 FL (ref 80–100)
MONOCYTES # BLD AUTO: 0.3 THOU/UL (ref 0–0.9)
MONOCYTES NFR BLD AUTO: 4 % (ref 2–10)
NEUTROPHILS # BLD AUTO: 5.2 THOU/UL (ref 2–7.7)
NEUTROPHILS NFR BLD AUTO: 70 % (ref 50–70)
PLATELET # BLD AUTO: 229 THOU/UL (ref 140–440)
PMV BLD AUTO: 10.5 FL (ref 8.5–12.5)
POTASSIUM BLD-SCNC: 3.7 MMOL/L (ref 3.5–5)
PROT SERPL-MCNC: 7.6 G/DL (ref 6–8)
RBC # BLD AUTO: 4.85 MILL/UL (ref 3.8–5.4)
RHEUMATOID FACT SERPL-ACNC: <15 IU/ML (ref 0–30)
SODIUM SERPL-SCNC: 138 MMOL/L (ref 136–145)
WBC: 7.4 THOU/UL (ref 4–11)

## 2020-09-23 ASSESSMENT — MIFFLIN-ST. JEOR: SCORE: 1444.06

## 2020-09-24 LAB
ALDOLASE SERPL-CCNC: 2.4 U/L (ref 1.5–8.1)
ANA SER QL: 0.6 U

## 2020-09-25 ENCOUNTER — COMMUNICATION - HEALTHEAST (OUTPATIENT)
Dept: ONCOLOGY | Facility: HOSPITAL | Age: 35
End: 2020-09-25

## 2020-10-02 DIAGNOSIS — D68.62 LUPUS ANTICOAGULANT SYNDROME (H): ICD-10-CM

## 2020-10-02 NOTE — TELEPHONE ENCOUNTER
Message to physician: See contact filed    Date of last visit: 04/17/20    Date of next visit if scheduled: None    Last Comprehensive Metabolic Panel:  Sodium   Date Value Ref Range Status   10/02/2017 136.0 133.0 - 144.0 mmol/L Final     Potassium   Date Value Ref Range Status   10/02/2017 4.8 3.4 - 5.3 mmol/L Final     Chloride   Date Value Ref Range Status   10/02/2017 105.0 94.0 - 109.0 mmol/L Final     Carbon Dioxide   Date Value Ref Range Status   10/02/2017 27.0 20.0 - 32.0 mmol/L Final     Glucose   Date Value Ref Range Status   10/02/2017 90.0 60.0 - 109.0 mg/dL Final     Urea Nitrogen   Date Value Ref Range Status   10/02/2017 10.0 5.0 - 24.0 mg/dL Final     Creatinine   Date Value Ref Range Status   10/02/2017 1.0 0.6 - 1.3 mg/dL Final     GFR Estimate   Date Value Ref Range Status   05/31/2016 >60 >60 mL/min/1.73m2 Final     Calcium   Date Value Ref Range Status   10/02/2017 9.5 8.5 - 10.4 mg/dL Final       BP Readings from Last 3 Encounters:   07/29/19 133/80   06/21/19 (!) 136/95   12/18/18 126/88       No results found for: A1C             Please complete refill and CLOSE ENCOUNTER.  Closing the encounter signifies the refill is complete.

## 2020-10-05 RX ORDER — WARFARIN SODIUM 5 MG/1
5 TABLET ORAL DAILY
Qty: 30 TABLET | Refills: 0 | Status: SHIPPED | OUTPATIENT
Start: 2020-10-05 | End: 2021-05-25

## 2020-10-27 ENCOUNTER — OFFICE VISIT (OUTPATIENT)
Dept: FAMILY MEDICINE | Facility: CLINIC | Age: 35
End: 2020-10-27
Payer: MEDICARE

## 2020-10-27 VITALS
WEIGHT: 180 LBS | SYSTOLIC BLOOD PRESSURE: 160 MMHG | DIASTOLIC BLOOD PRESSURE: 78 MMHG | BODY MASS INDEX: 34.58 KG/M2 | HEART RATE: 105 BPM | RESPIRATION RATE: 20 BRPM

## 2020-10-27 DIAGNOSIS — L02.419 CELLULITIS AND ABSCESS OF LEG: Primary | ICD-10-CM

## 2020-10-27 DIAGNOSIS — L03.119 CELLULITIS AND ABSCESS OF LEG: Primary | ICD-10-CM

## 2020-10-27 PROBLEM — Z86.711 HISTORY OF PULMONARY EMBOLISM: Status: ACTIVE | Noted: 2020-09-23

## 2020-10-27 PROCEDURE — 99214 OFFICE O/P EST MOD 30 MIN: CPT | Mod: GC | Performed by: STUDENT IN AN ORGANIZED HEALTH CARE EDUCATION/TRAINING PROGRAM

## 2020-10-27 RX ORDER — SULFAMETHOXAZOLE/TRIMETHOPRIM 800-160 MG
1 TABLET ORAL 2 TIMES DAILY
Qty: 20 TABLET | Refills: 0 | Status: SHIPPED | OUTPATIENT
Start: 2020-10-27 | End: 2020-11-06

## 2020-10-27 NOTE — PROGRESS NOTES
Preceptor Attestation:   Patient seen, evaluated and discussed with the resident. I have verified the content of the note, which accurately reflects my assessment of the patient and the plan of care.  Supervising Physician:Jill Heart MD  [facility]

## 2020-10-27 NOTE — PROGRESS NOTES
"  Subjective:   Lisandra Arauz is a 35 year old female with a significant past medical history of lupus anticoagulant syndrome on warfarin, anxiety who presents for the new concern of a \"painful hard thing\" on the back of her right leg.    Concern - painful lump on thigh  Onset: 1 year ago started becoming firm, only become painful in last 2 weeks    Description:   Hard, tender, warm mass in posterior right upper leg    Intensity: moderate/severe    Progression of Symptoms:  Constant except for when perfectly still    Accompanying Signs & Symptoms:  Warm, erythematous, firm    Previous history of similar problem:   - hx of 2 cysts on tailbone, removed surgically in 2010    Precipitating factors:   Worsened by: pressure, movement    Alleviating factors:  Improved by: remaining still; no improvement with heat or tyelenol    PMHX:     Patient Active Problem List   Diagnosis     Lupus anticoagulant syndrome (H)     Intractable chronic migraine without aura     Gastroesophageal reflux disease     Chronic pain     Dysfunctional uterine bleeding     Generalized anxiety disorder     Obesity (BMI 30.0-34.9)     Restless legs syndrome     Cervical intraepithelial neoplasia grade III with severe dysplasia     Tobacco use disorder     Pap smear for cervical cancer screening     PTSD (post-traumatic stress disorder)     History of pulmonary embolism       Current Outpatient Medications   Medication Sig Dispense Refill     sulfamethoxazole-trimethoprim (BACTRIM DS) 800-160 MG tablet Take 1 tablet by mouth 2 times daily for 10 days 20 tablet 0     busPIRone (BUSPAR) 5 MG tablet Take 1 tablet (5 mg) by mouth 3 times daily (Patient not taking: Reported on 4/17/2020) 30 tablet 0     clonazePAM (KLONOPIN) 0.5 MG tablet Take 1 tablet (0.5 mg) by mouth 2 times daily for 3 days, THEN 0.5 tablets (0.25 mg) 2 times daily for 4 days. 10 tablet 0     clonazePAM (KLONOPIN) 1 MG tablet Take one tablet every morning and two tablets at night one " hour before bed (Patient not taking: Reported on 4/17/2020) 90 tablet 0     PARoxetine (PAXIL) 30 MG tablet Take 1 tablet (30 mg) by mouth every morning 90 tablet 1     PARoxetine (PAXIL) 30 MG tablet Take 1 tablet (30 mg) by mouth every morning 30 tablet 1     ranitidine (ZANTAC) 150 MG tablet Take 1 tablet (150 mg) by mouth 2 times daily (Patient not taking: Reported on 4/17/2020) 60 tablet 1     warfarin ANTICOAGULANT (COUMADIN) 5 MG tablet Take 1 tablet (5 mg) by mouth daily 30 tablet 0     warfarin ANTICOAGULANT 5 MG PO tablet Take 1 tablet (5 mg) by mouth daily 30 tablet 0       Social History     Tobacco Use     Smoking status: Current Every Day Smoker     Types: Cigarettes     Smokeless tobacco: Never Used     Tobacco comment: about 6 per day   Substance Use Topics     Alcohol use: No     Alcohol/week: 0.0 standard drinks     Drug use: No     Types: Marijuana      Allergies   Allergen Reactions     Hydrocodone-Acetaminophen Anaphylaxis     Phenothiazines      Other reaction(s): Wheezing     Prochlorperazine Anaphylaxis, Other (See Comments) and Swelling     Gabapentin Other (See Comments)     Nausea and vomiting, anxiety     Oxycodone-Acetaminophen Other (See Comments)     itching     Tramadol Other (See Comments)     Mood swings, anger.        Review of Systems:     ROS otherwise negative unless stated above.     Objective:     BP (!) 160/78   Pulse 105   Resp 20   Wt 81.6 kg (180 lb)   BMI 34.58 kg/m    Body mass index is 34.58 kg/m .  GENERAL APPEARANCE: healthy, alert and no distress,  EYES: Eyes grossly normal to inspection,  PERRL  RESP: Normal work of breathing on room air  MS: extremities normal- no gross deformities noted  SKIN: 2 cm x 3 cm erythematous and tender area along the posterior right thigh; no definite areas of fluctuance  NEURO: Normal strength and tone, sensory exam grossly normal, mentation appears intact and speech normal  PSYCH: mood and affect normal/bright    Assessment & Plan:      1. Cellulitis and abscess of leg  History and exam most consistent with cellulitis. Possible that there is a developing abscess however no clear area of fluctuance that would be amenable to drainage in the office. Will treat with 10 day course of Bactrim. Reviewed return precautions and reasons to seek more emergent medical care.   - sulfamethoxazole-trimethoprim (BACTRIM DS) 800-160 MG tablet; Take 1 tablet by mouth 2 times daily for 10 days  Dispense: 20 tablet; Refill: 0    Options for treatment and follow-up care were reviewed with the patient and/or guardian. Lisandra Arauz and/or guardian engaged in the decision making process and verbalized understanding of the options discussed and agreed with the final plan.    Phan Bowers, MS3    I was present with the medical student who participated in the service and in the documentation of this note. I have verified the history and personally performed the physical exam and medical decision making, and have verified the content of the note, which accurately reflects my assessment of the patient and the plan of care.    Kathi Boyd MD  [facility] Resident    Precepted with: Jill Heart MD

## 2020-10-28 ENCOUNTER — TELEPHONE (OUTPATIENT)
Dept: FAMILY MEDICINE | Facility: CLINIC | Age: 35
End: 2020-10-28

## 2020-10-28 NOTE — TELEPHONE ENCOUNTER
I got the pt ED discharge paperwork, I called to check up on the pt and help the pt setup a ED follow up. The pt was at [facility] for painful lump on her lateral right thigh. I talked to the pt, she stated that she is doing better. Pt stated that she needed to come in to get a referral for dermatology. I was able to schedule the pt to come in tomorrow 10/29/2020 at 2:40pm with .

## 2020-11-02 ENCOUNTER — TELEPHONE (OUTPATIENT)
Dept: FAMILY MEDICINE | Facility: CLINIC | Age: 35
End: 2020-11-02

## 2020-11-02 NOTE — TELEPHONE ENCOUNTER
[facility] phone call message - order or referral request for patient:     Order or referral being requested: referral to dermatologist      Additional Comments: Calling stating she came in and saw a doctor about her abscess on her thigh but there wasn't much the doctor could do so after the appt she had [facility] [place] and they drained the abscess and not sure if he got everything, most likely it will come back. So they told her to get a referral to see a dermatologist as it is not getting any better and doesn't want to make an appt to come when nothing they can do for her. She states this has been going on for over a month and a half. She states she would like a referral to a dermatologist around her area in [place] or [place] would work for her too. Told her it could take up to two business days for a response. Please call and advise.     OK to leave a message on voice mail?     Primary language: English      needed? No    Call taken on November 2, 2020 at 8:37 AM by Vj Merchant

## 2020-11-03 NOTE — TELEPHONE ENCOUNTER
Recommend virtual visit for patient with our clinic.  Derm referral can be made at that time if needed.

## 2020-11-17 DIAGNOSIS — D68.62 LUPUS ANTICOAGULANT SYNDROME (H): ICD-10-CM

## 2020-11-17 RX ORDER — WARFARIN SODIUM 5 MG/1
5 TABLET ORAL DAILY
Qty: 30 TABLET | Refills: 1 | Status: SHIPPED | OUTPATIENT
Start: 2020-11-17 | End: 2021-06-29

## 2020-12-01 DIAGNOSIS — F43.22 ADJUSTMENT DISORDER WITH ANXIOUS MOOD: ICD-10-CM

## 2020-12-01 RX ORDER — PAROXETINE 30 MG/1
30 TABLET, FILM COATED ORAL EVERY MORNING
Qty: 30 TABLET | Refills: 1 | Status: SHIPPED | OUTPATIENT
Start: 2020-12-01 | End: 2021-02-01

## 2020-12-13 ENCOUNTER — TELEPHONE (OUTPATIENT)
Dept: FAMILY MEDICINE | Facility: CLINIC | Age: 35
End: 2020-12-13

## 2020-12-13 DIAGNOSIS — K05.30 PERIODONTITIS: Primary | ICD-10-CM

## 2020-12-13 RX ORDER — AMOXICILLIN 500 MG/1
500 CAPSULE ORAL 3 TIMES DAILY
Qty: 21 CAPSULE | Refills: 0 | Status: SHIPPED | OUTPATIENT
Start: 2020-12-13 | End: 2020-12-20

## 2020-12-13 NOTE — TELEPHONE ENCOUNTER
"Called patient due to complaint of dental pain. Patient has dentist appointment coming up on 12/22. States that the pain is too intense to wait that long. Patient has been vomiting since she was a kid, and she states that the acid has worn her teeth away and this has gotten to the point where it is painful \"up in the nerves, like in the cheekbones.\" It is not one specific tooth that hurts, but rather it is all of the teeth from \"acid erosion.\" When pressed, she thinks it may be one specific tooth in the back in the top right, maybe a dental abscess. She states that she has had about 5-6 root canals for this reason. Denies fevers, chills, discrete abscess, purulence, tooth trauma. States that the pain is too bad to bear, and it is interfering with her sleep. She wants to know if it would be appropriate to use antibiotics for this since this has helped in the past.    Discussed with patient that, if it is one specific tooth, we could try a short course of amoxicillin for presumed dental abscess, but the ultimate treatment would be to go see a dentist sooner than 12/22 for definitive treatment. She agrees to do this. Will send amoxicillin to her pharmacy, but she should be scheduled for INR check within the next 1-2 weeks given amoxicillin.     Discussed with Dr. Hill.    Evelia Frazier MD  [facility] Resident PGY-2    "

## 2020-12-14 ENCOUNTER — TELEPHONE (OUTPATIENT)
Dept: FAMILY MEDICINE | Facility: CLINIC | Age: 35
End: 2020-12-14

## 2020-12-14 NOTE — TELEPHONE ENCOUNTER
----- Message from Evelia Frazier MD sent at 12/13/2020  1:52 PM CST -----  Regarding: INR check  Please schedule Lisandra Arauz for INR check within the next 1-2 weeks.    Evelia Frazier MD

## 2020-12-22 ENCOUNTER — TRANSFERRED RECORDS (OUTPATIENT)
Dept: HEALTH INFORMATION MANAGEMENT | Facility: CLINIC | Age: 35
End: 2020-12-22

## 2020-12-26 ENCOUNTER — TELEPHONE (OUTPATIENT)
Dept: FAMILY MEDICINE | Facility: CLINIC | Age: 35
End: 2020-12-26

## 2020-12-26 DIAGNOSIS — K08.89 PAIN, DENTAL: Primary | ICD-10-CM

## 2020-12-27 NOTE — TELEPHONE ENCOUNTER
Patient calling reporting several days of dental pain in R upper anterior tooth.  Pain radiates from tooth slightly but does not cross midline.  Some pain with chewing.  No noticeable swelling or drainage and no tenderness around the tooth, just in the tooth itself.  No fevers/chills or other systemic symptoms.  No pain in the lower jaw.  Brushes teeth regularly but has not seen a dentist in 5 years and has a long history of acid reflux that has caused damage to her teeth over time.  She has no allergies to antibiotics that she knows of.  Recommended trying Tylenol for pain and will prescribe 10 days Augmentin to treat presumed infection.  Recommended rapid follow-up with both our clinic for reevaluation and a dentist for definitive therapy.  Discussed reasons to seek emergency room evaluation and patient verbalized understanding.    Aristides Ambrocio MD  [facility] Res[facility], PGY-2

## 2020-12-28 ENCOUNTER — TELEPHONE (OUTPATIENT)
Dept: FAMILY MEDICINE | Facility: CLINIC | Age: 35
End: 2020-12-28

## 2020-12-28 ENCOUNTER — MEDICAL CORRESPONDENCE (OUTPATIENT)
Dept: FAMILY MEDICINE | Facility: CLINIC | Age: 35
End: 2020-12-28

## 2020-12-30 ENCOUNTER — OFFICE VISIT (OUTPATIENT)
Dept: FAMILY MEDICINE | Facility: CLINIC | Age: 35
End: 2020-12-30
Payer: MEDICARE

## 2020-12-30 VITALS
WEIGHT: 187 LBS | DIASTOLIC BLOOD PRESSURE: 89 MMHG | RESPIRATION RATE: 16 BRPM | TEMPERATURE: 98.7 F | BODY MASS INDEX: 34.41 KG/M2 | OXYGEN SATURATION: 97 % | HEART RATE: 89 BPM | SYSTOLIC BLOOD PRESSURE: 134 MMHG | HEIGHT: 62 IN

## 2020-12-30 DIAGNOSIS — Z86.711 HISTORY OF PULMONARY EMBOLISM: ICD-10-CM

## 2020-12-30 DIAGNOSIS — K08.89 PAIN, DENTAL: ICD-10-CM

## 2020-12-30 DIAGNOSIS — Z09 HOSPITAL DISCHARGE FOLLOW-UP: Primary | ICD-10-CM

## 2020-12-30 DIAGNOSIS — F41.9 ANXIETY: ICD-10-CM

## 2020-12-30 DIAGNOSIS — D68.62 LUPUS ANTICOAGULANT SYNDROME (H): ICD-10-CM

## 2020-12-30 DIAGNOSIS — Z23 NEED FOR PROPHYLACTIC VACCINATION AND INOCULATION AGAINST INFLUENZA: ICD-10-CM

## 2020-12-30 LAB — HEMOGLOBIN: 13.5 G/DL (ref 11.7–15.7)

## 2020-12-30 PROCEDURE — G0008 ADMIN INFLUENZA VIRUS VAC: HCPCS | Performed by: STUDENT IN AN ORGANIZED HEALTH CARE EDUCATION/TRAINING PROGRAM

## 2020-12-30 PROCEDURE — 99496 TRANSJ CARE MGMT HIGH F2F 7D: CPT | Mod: GC | Performed by: STUDENT IN AN ORGANIZED HEALTH CARE EDUCATION/TRAINING PROGRAM

## 2020-12-30 PROCEDURE — 85018 HEMOGLOBIN: CPT | Performed by: STUDENT IN AN ORGANIZED HEALTH CARE EDUCATION/TRAINING PROGRAM

## 2020-12-30 PROCEDURE — 36415 COLL VENOUS BLD VENIPUNCTURE: CPT | Performed by: STUDENT IN AN ORGANIZED HEALTH CARE EDUCATION/TRAINING PROGRAM

## 2020-12-30 PROCEDURE — 90686 IIV4 VACC NO PRSV 0.5 ML IM: CPT | Performed by: STUDENT IN AN ORGANIZED HEALTH CARE EDUCATION/TRAINING PROGRAM

## 2020-12-30 RX ORDER — WARFARIN SODIUM 5 MG/1
5 TABLET ORAL DAILY
Qty: 90 TABLET | Refills: 0 | Status: SHIPPED | OUTPATIENT
Start: 2020-12-30 | End: 2021-12-21

## 2020-12-30 ASSESSMENT — MIFFLIN-ST. JEOR: SCORE: 1491.61

## 2020-12-30 NOTE — PROGRESS NOTES
I have personally reviewed the history and examination as documented by Dr. Rae.  I was present during key portions of the visit and agree with the assessment and plan as documented for 35 yr old female with recent ectopic pregnancy s/p salpingectomy here for hospital follow-up. Was recommended to restart coumadin for hx of PE/ lupus anticoagulant but held due to post-op bleeding. Now improved. Will restart lovenox bridge and coumadin. Follow-up in 5 days for INR check. Precautions given. Anticipatory guidance given.     William Haji MD  December 30, 2020  2:59 PM

## 2020-12-30 NOTE — PROGRESS NOTES
CHIEF COMPLAINT                                                      Chief Complaint   Patient presents with     Hospital F/U     ectopic pregnancy [facility] feeling better, gets anxiety      Medication Reconciliation     SUBJECTIVE:                                                    Lisandra Arauz is a 35 year old year old female who presents to clinic today for the following health issues:      ED/UC Hospitalization Followup:    Facility:  [facility]   Date of visit: 12/22/2020   Discharge:  12/23/2020  Reason for visit: Abdominal pain  Current Status: Diagnosed with ectopic pregnancy in the setting of previous tubal ligation, admitted for bilateral salpingectomy with path illustrating believed ectopic pregnancy   -Since discharge post surgical bleeding now has started to slow down.    -Has follow up with surgeon in 2 weeks.   -Tubes tied since 2015  -Abdominal pain is doing better now  -Patient was discharged with one day of lovenox and was told to restart coumadin  -Patient tried taking coumadin right after discharge but had increased bleeding so stopped taking  -Otherwise feeling greatly improved overall    -Of note, followed up with oncology after her last clinic visit.  Decided she in fact needs to be anticoagulated given her history of PE believed 2/2 to Lupus.      Dental Pain  -Called clinic hotline and discussed dental pain  -Started on 10 days of augmentin  -Symptoms greatly improved  -Earliest she can get in is 1/27/2020     Patient is an established patient of this clinic.  ----------------------------------------------------------------------------------------------------------------------  Patient Active Problem List   Diagnosis     Lupus anticoagulant syndrome (H)     Intractable chronic migraine without aura     Gastroesophageal reflux disease     Chronic pain     Dysfunctional uterine bleeding     Generalized anxiety disorder     Obesity (BMI 30.0-34.9)     Restless legs syndrome      Cervical intraepithelial neoplasia grade III with severe dysplasia     Tobacco use disorder     Pap smear for cervical cancer screening     PTSD (post-traumatic stress disorder)     History of pulmonary embolism     Past Surgical History:   Procedure Laterality Date     LEEP TX, CERVICAL      2009 for JEFF III     TUBAL LIGATION         Social History     Tobacco Use     Smoking status: Current Every Day Smoker     Types: Cigarettes     Smokeless tobacco: Never Used     Tobacco comment: about 6 per day   Substance Use Topics     Alcohol use: No     Alcohol/week: 0.0 standard drinks     Family History   Problem Relation Age of Onset     Diabetes Mother      Hypertension Mother      Hyperlipidemia Mother      Coronary Artery Disease Paternal Uncle      Colon Cancer Father      Hypertension Father      Breast Cancer Paternal Grandmother          Problem list and past medical, surgical, social, and family histories reviewed & adjusted, as indicated.    Current Outpatient Medications   Medication Sig Dispense Refill     amoxicillin-clavulanate (AUGMENTIN) 875-125 MG tablet Take 1 tablet by mouth 2 times daily for 10 days 20 tablet 0     PARoxetine (PAXIL) 30 MG tablet Take 1 tablet (30 mg) by mouth every morning 30 tablet 1     warfarin ANTICOAGULANT (COUMADIN) 5 MG tablet Take 1 tablet (5 mg) by mouth daily 30 tablet 1     busPIRone (BUSPAR) 5 MG tablet Take 1 tablet (5 mg) by mouth 3 times daily (Patient not taking: Reported on 4/17/2020) 30 tablet 0     clonazePAM (KLONOPIN) 1 MG tablet Take one tablet every morning and two tablets at night one hour before bed (Patient not taking: Reported on 4/17/2020) 90 tablet 0     PARoxetine (PAXIL) 30 MG tablet Take 1 tablet (30 mg) by mouth every morning (Patient not taking: Reported on 12/30/2020) 90 tablet 1     ranitidine (ZANTAC) 150 MG tablet Take 1 tablet (150 mg) by mouth 2 times daily (Patient not taking: Reported on 4/17/2020) 60 tablet 1     warfarin ANTICOAGULANT  "(COUMADIN) 5 MG tablet Take 1 tablet (5 mg) by mouth daily 30 tablet 0     Medication list reviewed and updated as indicated.    Allergies   Allergen Reactions     Hydrocodone-Acetaminophen Anaphylaxis     Phenothiazines      Other reaction(s): Wheezing     Prochlorperazine Anaphylaxis, Other (See Comments) and Swelling     Gabapentin Other (See Comments)     Nausea and vomiting, anxiety     Oxycodone-Acetaminophen Other (See Comments)     itching     Tramadol Other (See Comments)     Mood swings, anger.      Allergies reviewed and updated as indicated.  ----------------------------------------------------------------------------------------------------------------------  ROS:  Constitutional, HEENT, cardiovascular, pulmonary, GI, musculoskeletal, neuro, skin, and psych systems are negative, except as otherwise noted.    OBJECTIVE:     /89   Pulse 89   Temp 98.7  F (37.1  C) (Oral)   Resp 16   Ht 1.575 m (5' 2.01\")   Wt 84.8 kg (187 lb)   SpO2 97%   BMI 34.19 kg/m    Body mass index is 34.19 kg/m .  Exam:  Constitutional: healthy, alert and no distress  Head: Normocephalic. No masses, lesions, tenderness or abnormalities  ENT: Poor dentition, no obvious dental abscess, no significant tenderness with palpation of the gums  Cardiovascular: negative, PMI normal. No lifts, heaves, or thrills. RRR. No murmurs, clicks gallops or rub  Respiratory: negative, Percussion normal. Good diaphragmatic excursion. Lungs clear  Gastrointestinal: Abdomen soft, mildly tender to palpation on exam. BS normal. No masses, organomegaly  Musculoskeletal: extremities normal- no gross deformities noted, gait normal and normal muscle tone  Skin: no suspicious lesions or rashes  Neurologic: Gait normal. Reflexes normal and symmetric. Sensation grossly WNL.  Psychiatric: mentation appears normal and affect normal/bright    Diagnostic Test Results:  Results for orders placed or performed in visit on 12/30/20 (from the past 24 " hour(s))   Hemoglobin (HGB) ([facility])   Result Value Ref Range    Hemoglobin 13.5 11.7 - 15.7 g/dL       Path tissue from care everywhere  A) LEFT FALLOPIAN TUBE, LEFT SALPINGECTOMY:  1. Histologically unremarkable fimbriated fallopian tube  2. Negative for malignancy    B) RIGHT FALLOPIAN TUBE, RIGHT SALPINGECTOMY:  1. Segment of fallopian tube with implantation site and detached chorionic villi admixed with hemorrhage/clot, consistent with ectopic tubal pregnancy  2. Somatic fetal tissues are not present  3. Negative for abnormal trophoblastic proliferation and malignancy    ASSESSMENT/PLAN:     (Z09) Hospital discharge follow-up  (primary encounter diagnosis)  (D68.62) Lupus anticoagulant syndrome (H)  (Z86.711) History of pulmonary embolism  -Patient seen in the ED on 12/22/2020 at [facility] and was found to have an ectopic pregnancy  -Ex lap and bilateral salpingectomy performed, pathology revealed what is thought to be an ectopic pregnancy  -Discharged on 12/23/2020 in stable condition, planned to restart warfarin after that  -Patient started taking warfarin for one day, but had some increased bleeding so stopped immediatly  -Has not been taking Lovenox or warfarin since then  -Patient has a history of PE 2/2 to lupus and difficulty obtaining therapeutic INR  -Bleeding since the procedure has been improving  -Discussed need to bridge patient with lovenox (initially sent wrong script, this was corrected)  -Patient was unable to fill Lovenox, discussed risks of clotting given she should be antigogulated given her history of PE in the setting of lupus   -Will also restart warfarin 5 mg daily (insurance does not cover alternative therapies unfortunately)  -Hgb today stable  -Patient will follow up with OBGYN in 2 weeks  -Plan to follow up INR on 1/4/2021 to check it is therapeutic    (F41.9) Anxiety  -Patient feeling more stressed out recently  -Feels like paxil is working but misses speaking with  therapist  -Insurance stopped covering her previous clinic  -Referall placed to see if there is a clinic that will accept her insurance     (K08.89) Pain, dental  -Called 12/26/2020 with dental pain  -Dr. Ambrocio started Augmentin BID x 10 days  -Has schedule with dentist 1/27/2020  -Discussed how she should take the therapy as prescribed; BID (has been doing Augmentin only once daily to last longer)  -Will call with any concerns prior to her visit  -On exam today, is a poor dentition, but no obvious abscess on exam     (Z23) Need for prophylactic vaccination and inoculation against influenza  -Given flu vaccine     There are no discontinued medications.    Options for treatment and follow-up care were reviewed with the patient and/or guardian. Lisandra Arauz and/or guardian engaged in the decision making process and verbalized understanding of the options discussed and agreed with the final plan    Precepted with Dr. Haji.    Shaka Rae MD on 12/30/2020 at 2:07 PM

## 2020-12-30 NOTE — PROGRESS NOTES
{  CHIEF COMPLAINT                                                    No chief complaint on file.  }  SUBJECTIVE:                                                    Lisandra Arauz is a 35 year old year old female who presents to clinic today for the following health issues:      ED/UC Followup:    Facility:  ***  Date of visit: ***  Reason for visit: ***  Current Status: ***    She will restart coumadin 5 mg tonight at midnight,  She will check with her primary care doctor on Wednesday afternoon to receive instructions about when to check INR  She will receive one dose of lovenox 40 mg tomorrow morning to help bridge her anticoagulation for one day.    She will receive oxycodone #10 and tylenol on discharge with follow up plans to see me in 2 weeks.    Joan Pizano MD           {additional problems for provider to add:214674}    Patient is {New or Established:620218} patient of this clinic.    {:456734}  ----------------------------------------------------------------------------------------------------------------------  Patient Active Problem List   Diagnosis     Lupus anticoagulant syndrome (H)     Intractable chronic migraine without aura     Gastroesophageal reflux disease     Chronic pain     Dysfunctional uterine bleeding     Generalized anxiety disorder     Obesity (BMI 30.0-34.9)     Restless legs syndrome     Cervical intraepithelial neoplasia grade III with severe dysplasia     Tobacco use disorder     Pap smear for cervical cancer screening     PTSD (post-traumatic stress disorder)     History of pulmonary embolism     Past Surgical History:   Procedure Laterality Date     LEEP TX, CERVICAL      2009 for JEFF III     TUBAL LIGATION         Social History     Tobacco Use     Smoking status: Current Every Day Smoker     Types: Cigarettes     Smokeless tobacco: Never Used     Tobacco comment: about 6 per day   Substance Use Topics     Alcohol use: No     Alcohol/week: 0.0 standard drinks      Family History   Problem Relation Age of Onset     Diabetes Mother      Hypertension Mother      Hyperlipidemia Mother      Coronary Artery Disease Paternal Uncle      Colon Cancer Father      Hypertension Father      Breast Cancer Paternal Grandmother          Problem list and past medical, surgical, social, and family histories reviewed & adjusted, as indicated.    Current Outpatient Medications   Medication Sig Dispense Refill     amoxicillin-clavulanate (AUGMENTIN) 875-125 MG tablet Take 1 tablet by mouth 2 times daily for 10 days 20 tablet 0     busPIRone (BUSPAR) 5 MG tablet Take 1 tablet (5 mg) by mouth 3 times daily (Patient not taking: Reported on 4/17/2020) 30 tablet 0     clonazePAM (KLONOPIN) 1 MG tablet Take one tablet every morning and two tablets at night one hour before bed (Patient not taking: Reported on 4/17/2020) 90 tablet 0     PARoxetine (PAXIL) 30 MG tablet Take 1 tablet (30 mg) by mouth every morning 30 tablet 1     PARoxetine (PAXIL) 30 MG tablet Take 1 tablet (30 mg) by mouth every morning 90 tablet 1     ranitidine (ZANTAC) 150 MG tablet Take 1 tablet (150 mg) by mouth 2 times daily (Patient not taking: Reported on 4/17/2020) 60 tablet 1     warfarin ANTICOAGULANT (COUMADIN) 5 MG tablet Take 1 tablet (5 mg) by mouth daily 30 tablet 1     warfarin ANTICOAGULANT (COUMADIN) 5 MG tablet Take 1 tablet (5 mg) by mouth daily 30 tablet 0     Medication list reviewed and updated as indicated.    Allergies   Allergen Reactions     Hydrocodone-Acetaminophen Anaphylaxis     Phenothiazines      Other reaction(s): Wheezing     Prochlorperazine Anaphylaxis, Other (See Comments) and Swelling     Gabapentin Other (See Comments)     Nausea and vomiting, anxiety     Oxycodone-Acetaminophen Other (See Comments)     itching     Tramadol Other (See Comments)     Mood swings, anger.      Allergies reviewed and updated as  "indicated.  ----------------------------------------------------------------------------------------------------------------------  ROS:  {ROS COMP:028170::\"Constitutional, HEENT, cardiovascular, pulmonary, GI, musculoskeletal, neuro, skin, and psych systems are negative, except as otherwise noted.\"}    OBJECTIVE:     There were no vitals taken for this visit.  There is no height or weight on file to calculate BMI.  Exam:  Constitutional: {GENERAL APPEARANCE:852069::\"healthy\",\"alert\",\"no distress\"}  Head: {HEAD EXAM:301::\"Normocephalic. No masses, lesions, tenderness or abnormalities\"}  Neck: {NECK EXAM:304::\"Neck supple. No adenopathy. Thyroid symmetric, normal size,\",\"Carotids without bruits.\"}  ENT: {ENT EXAM:5032::\"ENT exam normal, no neck nodes or sinus tenderness\"}  Cardiovascular: {HEART EXAM:501::\"negative\",\"PMI normal. No lifts, heaves, or thrills. RRR. No murmurs, clicks gallops or rub\"}  Respiratory: {LUNG EXAM:401::\"negative\",\"Percussion normal. Good diaphragmatic excursion. Lungs clear\"}  Gastrointestinal: {ABDOMEN EXAM:601::\"Abdomen soft, non-tender. BS normal. No masses, organomegaly\"}  : { Normal Exam Male or Female:191863::\"Deferred\"}  Musculoskeletal: {DIDI EXAM MS/JOINT:521648::\"extremities normal- no gross deformities noted\",\"gait normal\",\"normal muscle tone\"}  Skin: {DIDI SKIN EXAM:215041::\"no suspicious lesions or rashes\"}  Neurologic: {NEURO EXAM:901::\"Gait normal. Reflexes normal and symmetric. Sensation grossly WNL.\"}  Psychiatric: {[facility] PATIENT PSYCH APPEARANCE:289072::\"mentation appears normal\",\"affect normal/bright\"}  Hematologic/Lymphatic/Immunologic: {[facility] EXAM LYMPH:562150::\"Normal cervical lymph nodes\"}    {Diagnostic Test Results:126480::\"Diagnostic Test Results:\",\"none \"}    Path tissue from care everywhere  A) LEFT FALLOPIAN TUBE, LEFT SALPINGECTOMY:  1. Histologically unremarkable fimbriated fallopian tube  2. Negative for malignancy    B) RIGHT FALLOPIAN TUBE, RIGHT " SALPINGECTOMY:  1. Segment of fallopian tube with implantation site and detached chorionic villi admixed with hemorrhage/clot, consistent with ectopic tubal pregnancy  2. Somatic fetal tissues are not present  3. Negative for abnormal trophoblastic proliferation and malignancy    ASSESSMENT/PLAN:     ***  No diagnosis found.    There are no discontinued medications.    Options for treatment and follow-up care were reviewed with the patient and/or guardian. Lisandra Arauz and/or guardian engaged in the decision making process and verbalized understanding of the options discussed and agreed with the final plan    Precepted with ***.    Shaka Rae MD on 12/30/2020 at 2:07 PM

## 2020-12-31 ENCOUNTER — TELEPHONE (OUTPATIENT)
Dept: FAMILY MEDICINE | Facility: CLINIC | Age: 35
End: 2020-12-31

## 2021-01-08 NOTE — TELEPHONE ENCOUNTER
"Out going call made to follow up mental health referral. Patient reports was seen at [facility] but unfortunately, they stopped taking her insurance. Would like to establish with both psychiatrist and psychology.   Lisandra reports financial insecurities as she receives a little over $800.00 a month from disability, which does not leave room for extra expenses such as co-pays for visits and/or medications. Emergency mental health and free clinics contact information provided. Denies food, housing, utilities insecurities, has a supportive \"boyfriend\" who provides some financial assistance.    Voices concerns with price of medications and is unable to pay for her Lovenox. Reports she picked up her coumadin and will take as directed. Reports receiving her last injection upon discharge from the hospital. No other concerns or needs at this time.  "
Called [facility] to call back for lab appt.  
Called pharmacy to assess issue with Lovenox. Pharmacist informs me initially they had questioned dose strength prescribed. There were two different Lovenox rxs sent into pharmacy and questioned Lovenox 120mg/0.8 ml. Since they have received new Lovenox 40mg/ 0.4ml dated 12/30/2020, they will void Lovenox 120mg prescription. Lovenox 40 mg #4 days supply is $31.60. Will call Lisandra to inform her of this information if she is not already aware of this correction.  RLkeila RN    Called Lisandra, left a message to call me back. Jose Alfredo ROSARIO  
Called x 2 NA.  
It does not look like the patient was able to stop by on 1/4/2020, if we could get an INR to ensure it is nearing therapeutic that would be appreciated.  
mild impairment

## 2021-01-25 DIAGNOSIS — K08.89 PAIN, DENTAL: ICD-10-CM

## 2021-02-01 DIAGNOSIS — F43.22 ADJUSTMENT DISORDER WITH ANXIOUS MOOD: ICD-10-CM

## 2021-02-01 RX ORDER — PAROXETINE 30 MG/1
30 TABLET, FILM COATED ORAL EVERY MORNING
Qty: 30 TABLET | Refills: 0 | Status: SHIPPED | OUTPATIENT
Start: 2021-02-01 | End: 2021-03-02

## 2021-02-01 NOTE — TELEPHONE ENCOUNTER
Message to physician:     Date of last visit: 12/30/2020     Date of next visit if scheduled: none    Last Comprehensive Metabolic Panel:  Sodium   Date Value Ref Range Status   10/02/2017 136.0 133.0 - 144.0 mmol/L Final     Potassium   Date Value Ref Range Status   10/02/2017 4.8 3.4 - 5.3 mmol/L Final     Chloride   Date Value Ref Range Status   10/02/2017 105.0 94.0 - 109.0 mmol/L Final     Carbon Dioxide   Date Value Ref Range Status   10/02/2017 27.0 20.0 - 32.0 mmol/L Final     Glucose   Date Value Ref Range Status   10/02/2017 90.0 60.0 - 109.0 mg/dL Final     Urea Nitrogen   Date Value Ref Range Status   10/02/2017 10.0 5.0 - 24.0 mg/dL Final     Creatinine   Date Value Ref Range Status   10/02/2017 1.0 0.6 - 1.3 mg/dL Final     GFR Estimate   Date Value Ref Range Status   05/31/2016 >60 >60 mL/min/1.73m2 Final     Calcium   Date Value Ref Range Status   10/02/2017 9.5 8.5 - 10.4 mg/dL Final       BP Readings from Last 3 Encounters:   12/30/20 134/89   10/27/20 (!) 160/78   07/29/19 133/80       No results found for: A1C             Please complete refill and CLOSE ENCOUNTER.  Closing the encounter signifies the refill is complete.

## 2021-03-02 DIAGNOSIS — F43.22 ADJUSTMENT DISORDER WITH ANXIOUS MOOD: ICD-10-CM

## 2021-03-06 ENCOUNTER — TELEPHONE (OUTPATIENT)
Dept: FAMILY MEDICINE | Facility: CLINIC | Age: 36
End: 2021-03-06

## 2021-03-06 DIAGNOSIS — F41.9 ANXIETY: ICD-10-CM

## 2021-03-06 RX ORDER — PAROXETINE 30 MG/1
30 TABLET, FILM COATED ORAL EVERY MORNING
Qty: 30 TABLET | Refills: 3 | Status: SHIPPED | OUTPATIENT
Start: 2021-03-06 | End: 2021-06-25

## 2021-03-06 NOTE — TELEPHONE ENCOUNTER
Weekend House Officer Note:    Patient calling in requesting Paxil refill. Last pill today. Worried about going into Paxil withdrawal.     1. Adjustment disorder with anxious mood  Refill sent to pharmacy.   - PARoxetine (PAXIL) 30 MG tablet; Take 1 tablet (30 mg) by mouth every morning  Dispense: 30 tablet; Refill: 3      Ezra Camarillo MD

## 2021-03-27 ENCOUNTER — AMBULATORY - HEALTHEAST (OUTPATIENT)
Dept: NURSING | Facility: CLINIC | Age: 36
End: 2021-03-27

## 2021-04-17 ENCOUNTER — AMBULATORY - HEALTHEAST (OUTPATIENT)
Dept: NURSING | Facility: CLINIC | Age: 36
End: 2021-04-17

## 2021-05-21 ENCOUNTER — TRANSFERRED RECORDS (OUTPATIENT)
Dept: HEALTH INFORMATION MANAGEMENT | Facility: CLINIC | Age: 36
End: 2021-05-21

## 2021-05-25 DIAGNOSIS — D68.62 LUPUS ANTICOAGULANT SYNDROME (H): ICD-10-CM

## 2021-05-26 RX ORDER — WARFARIN SODIUM 5 MG/1
5 TABLET ORAL DAILY
Qty: 30 TABLET | Refills: 1 | Status: SHIPPED | OUTPATIENT
Start: 2021-05-26 | End: 2022-06-15

## 2021-05-31 VITALS — BODY MASS INDEX: 28.89 KG/M2 | WEIGHT: 153 LBS | HEIGHT: 61 IN

## 2021-06-02 ENCOUNTER — RECORDS - HEALTHEAST (OUTPATIENT)
Dept: ADMINISTRATIVE | Facility: CLINIC | Age: 36
End: 2021-06-02

## 2021-06-05 VITALS
TEMPERATURE: 98.9 F | OXYGEN SATURATION: 94 % | SYSTOLIC BLOOD PRESSURE: 136 MMHG | BODY MASS INDEX: 33.01 KG/M2 | HEIGHT: 62 IN | WEIGHT: 179.4 LBS | HEART RATE: 86 BPM | DIASTOLIC BLOOD PRESSURE: 90 MMHG

## 2021-06-11 NOTE — TELEPHONE ENCOUNTER
All labs from 9/23 were reviewed with Dr. Campoverde. All looks good and normal. I call Lisandra to report this. She is to stay on long-term coumadin.     She verbalized understanding and appreciation of our conversation.     Tess Sánchez RN, Oncology Clinic   [facility]

## 2021-06-11 NOTE — TELEPHONE ENCOUNTER
Call placed to patient to let her know that the dermatology referral has been placed, also writer called her PMD clinic and was advised by scheduling that they have a pharmacist who follows INR patients for their clinic, the  recommended the patient see her PMD to set this up.  LM with all of this information on patient VM, advised to call our office if she has questions or concerns.

## 2021-06-11 NOTE — CONSULTS
[facility] Consult Note    Patient: Lisandra Arauz  MRN: [id number]  Date of Service: 09/23/2020      Reason for Visit:    1.  History of pulmonary embolism    Assessment/Plan:    1.  Pulmonary embolism: Her initial PE was provoked as she was on oral contraceptives.  It is more unclear if the subclavian vein DVT was acute or chronic.  She has been on warfarin indefinitely.  It is unclear what role, if any, the positive lupus anticoagulant is playing in her thrombosis risk.  I think it is reasonable to keep her on indefinite anticoagulation as long as the benefits outweigh the risks.  She will continue with warfarin.  We will see if we can get a newer anticoagulant for a cheaper price.  Goal INR is between 2 and 3.  We can see if she can get into a Coumadin clinic closer to home.    2.  Chronic pain: Upon review of her record she has had complaints of chronic pain for years.  She said many imaging studies over the past 7-8 years ago as well, mostly all negative.  She complains today of diffuse myalgias.  She has been evaluated in the pain clinic in the past.  We can recheck some general rheumatologic tests.  She may need to follow-up again with rheumatology.    3.  Skin rash: She was given a referral to dermatology.    ECOG Performance   ECOG Performance Status: 0    Distress Assessment  Distress Assessment Score: No distress    Problem List:    1. Other chronic pulmonary embolism without acute cor pulmonale (H)  INR    APTT(PTT)    HM1(CBC and Differential)    Comprehensive Metabolic Panel    Ambulatory referral to Dermatology    Sedimentation Rate    C-Reactive Protein    Antinuclear Antibody (KATIE) Cascade    Rheumatoid Factor Quantitiative    Aldolase   2. Abnormal coagulation profile   INR    APTT(PTT)   3. Myalgia     4. History of pulmonary embolism     5. Lupus anticoagulant positive     6. Rash and nonspecific skin eruption       Staging History:    Cancer Staging  No matching staging  information was found for the patient.    History:    Lisandra is a 35-year-old woman who is referred for an opinion regarding her history of pulmonary embolism.  The history is obtained from the patient as well as extensive chart review.  She is diagnosed with a right lower lobe pulmonary embolism in 2010.  Emergency room visit note from July 23, 2010 discusses the pulmonary embolism diagnosis.  At that time she was also taking Ortho Tri-Cyclen which is estrogen-containing. Imaging done at that time showed a small amount of pulmonary embolism at the bifurcation of the right lower lobe pulmonary artery extending into both posterior and lateral segments of branches.  She was found to have a short segment of nonocclusive thrombus in the lateral aspect of the right subclavian vein in January 2014.  Unclear if this was acute or chronic.  She has had positive lupus anticoagulant testing performed in August and September 2010 which was positive and again in 2017.  There is no family history of any blood clotting.  She has had difficulty throughout the years maintaining a therapeutic INR with multiple instances of subtherapeutic INR in the medical record.  I believe she saw Dr. De Leon from [place] [facility] in 2010 after her initial clot.  Today she complains of a diffuse rash which she has had for years and diffuse arthralgias and myalgias.  It appears she has a history of chronic pain and chronic myalgias.  She has been evaluated by rheumatology in the past but not recently.    Past History:    Past Medical History:   Diagnosis Date     Anemia      Anxiety      Blood clot of vein in shoulder area 1/2014     Chronic pain      Chronic tension headaches      Clotting disorder (H)      Hemorrhagic ovarian cyst 8/2011    Required 5 blood transfusions per patient     Heterozygous for MTHFR gene mutation      Kidney infection      Kidney stones      Lupus (H)      Ovarian cyst      Pulmonary embolus (H) 7/27/10    Now on lifelong  warfarin     Pyelonephritis     Family History   Problem Relation Age of Onset     Factor V Leiden deficiency Other       [unfilled] Social History     Socioeconomic History     Marital status: Single     Spouse name: Not on file     Number of children: Not on file     Years of education: Not on file     Highest education level: Not on file   Occupational History     Not on file   Social Needs     Financial resource strain: Not on file     Food insecurity     Worry: Not on file     Inability: Not on file     Transportation needs     Medical: Not on file     Non-medical: Not on file   Tobacco Use     Smoking status: Current Every Day Smoker     Packs/day: 0.50     Smokeless tobacco: Never Used     Tobacco comment: Smokes 6-7 cigarettes per day since age 16   Substance and Sexual Activity     Alcohol use: No     Comment: Drinks maybe twice per year     Drug use: Yes     Types: Marijuana     Sexual activity: Not Currently     Partners: Male     Birth control/protection: None   Lifestyle     Physical activity     Days per week: Not on file     Minutes per session: Not on file     Stress: Not on file   Relationships     Social connections     Talks on phone: Not on file     Gets together: Not on file     Attends Anabaptism service: Not on file     Active member of club or organization: Not on file     Attends meetings of clubs or organizations: Not on file     Relationship status: Not on file     Intimate partner violence     Fear of current or ex partner: Not on file     Emotionally abused: Not on file     Physically abused: Not on file     Forced sexual activity: Not on file   Other Topics Concern     Not on file   Social History Narrative     Not on file        Allergies:    Allergies   Allergen Reactions     Compazine [Prochlorperazine] Anaphylaxis     Vicodin [Hydrocodone-Acetaminophen] Anaphylaxis     tolerates IV morphine     Gabapentin Headache and Other (See Comments)     Nausea and vomiting, anxiety  Nausea and  vomiting, anxiety       Tramadol      Review of Systems:    General  General (WDL): Exceptions to WDL  Fatigue: Yes - Chronic (Greater than 3 months)  Fever: None  Generalized Muscle Weakness: Yes - Chronic (Greater than 3 months)  Weight Loss: No  ENT  ENT (WDL): Exceptions to WDL  Vertigo (Dizziness): None  Changes in vision: Yes - Recent (Less than 3 months)  Glasses or Contacts: None  Hearing loss: None  Hearing Aids: None  Tinnitus: None  Pain/Pressure in ears: None  Sinus Congestion/Drainage: None  Hoarseness: None  Sore Throat: None  Dental Problems: Yes - Recent (Less than 3 months)  Dentures: None  Respiratory  Respiratory (WDL): All respiratory elements are within defined limits  Cardiovascular  Cardiovascular (WDL): Exceptions to WDL  Palpitations: Yes - Recent (Less than 3 months)  Edema Limbs: None  Irregular Heart Beat: Yes - Recent (Less than 3 months)  Chest Pain: None  Lightheadedness: None  Endocrine  Endocrine (WDL): Exceptions to WDL  Heat Intolerance: Yes - Chronic (Greater than 3 months)  Excessive Thirst: Yes - Recent (Less than 3 months)  Cold Intolerance: Yes - Chronic (Greater than 3 months)  Excessive Urination: None  Hotflashes: None  Gastrointestinal  Gastrointestinal (WDL): Exceptions to WDL  Difficulty Swallowing: None  Heartburn: None  Constipation: None  Yellowish skin and/or eyes: None  Blood from rectum: None  Nausea and Vomiting: Yes - Recent (Less than 3 months)  Abdominal Pain: Yes - Recent (Less than 3 months)  Diarrhea: Yes - Recent (Less than 3 months)  Have had black or tan stools?: None  Hemorrhoids: None  Poor Appetite: Yes- Recent (Less than 3 months)  Musculoskeletal  Musculoskeletal (WDL): Exceptions to WDL  Range of Motion Limitation: Yes - Recent (Less than 3 months)  Joint pain: Yes - Recent (Less than 3 months)  Back Pain: None  Activity Assistance: None  Difficulty to lie flat for more than 30 minutes: None  Pain interfering with walking: Yes - Recent (Less than 3  months)  Muscle pain or stiffness: Yes - Recent (Less than 3 months)  Recent fall: None  Assistive device: None  Neurological  Neurological (WDL): Exceptions to WDL  History of LOC?: None  Headaches: Yes - Recent (Less than 3 months)  Difficulty walking: None  Difficulty with speech: None  Difficulty with memory: None  Vertigo (Dizziness): None  Dominant Hand: Right  Seizures: None  Difficulty with Balance: Yes - Recent (Less than 3 months)  Numbness and/or tingling: Yes - Recent (Less than 3 months)  Psychological/Emotional  Psychological/Emotional (WDL): Exceptions to WDL  Depression: None  Insomnia: Yes - Recent (Less than 3 months)  Panic attacks: Yes - Recent (Less than 3 months)  Anxiety: Yes - Recent (Less than 3 months)  Daytime sleepiness: None  Hallucinations: None  Psychosocial needs or not coping well: None  Hematological/Lymphatic  Hematological/Lymphatic (WDL): Exceptions to WDL  Bleeding gums: Yes - Chronic (Greater than 3 months)  Bruising: Yes - Chronic (Greater than 3 months)  Epistaxis: None  Swollen glands: None  Dermatological  Dermatologic (WDL): Exceptions to WDL  Open wounds: None  Rash : Yes - Chronic (Greater than 3 months)  Hair Loss: None  Healing Incision: None  Changes in moles: None  Significant sunburn: None  Genitourinary/Reproductive  Genitourinary/Reproductive (WDL): All genitourinary/reproductive elements are within defined limits  Reproductive (Females only)  Menstrual irritation or increase in discharge: No  Age at start of periods: 1999  Last menstural cycle: 09/10/2020  Number of pregnancies: 3  Age at first pregnancy: 17  Patient Pregnant?: No  Is the patient trying to get pregnant?: No  Is the patient on birth control: No  Pain  Currently in Pain: Yes  Pain Score (Initial OR Reassessment): 6  Pain Frequency: Constant/continuous  Location: entire body aches  Pain Characteristics : Stabbing  Pain Intervention(s): Home medication    Physical Exam:    Recent Vitals 9/23/2020  "  Height 5' 1.5\"   Weight 179 lbs 6 oz   BSA (m2) 1.88 m2   /90   Pulse 86   Temp 98.9   Temp src 1   SpO2 94   Some recent data might be hidden     General: patient appears stated age of 35 y.o.. Nontoxic and in no distress.   HEENT: Head: atraumatic, normocephalic. Sclerae anicteric.  Chest:  Normal respiratory effort  Cardiac:  No edema.   Abdomen: abdomen is non-distended  Extremities: normal tone and muscle bulk.   Skin: no lesions or rash. Warm and dry.   CNS: alert and oriented. Grossly non-focal.   Psychiatric: normal mood and affect.     Lab Results:    Recent Results (from the past 168 hour(s))   INR   Result Value Ref Range    INR 1.51 (H) 0.90 - 1.10   APTT(PTT)   Result Value Ref Range    PTT 35 24 - 37 seconds   Comprehensive Metabolic Panel   Result Value Ref Range    Sodium 138 136 - 145 mmol/L    Potassium 3.7 3.5 - 5.0 mmol/L    Chloride 104 98 - 107 mmol/L    CO2 26 22 - 31 mmol/L    Anion Gap, Calculation 8 5 - 18 mmol/L    Glucose 93 70 - 125 mg/dL    BUN 9 8 - 22 mg/dL    Creatinine 0.85 0.60 - 1.10 mg/dL    GFR MDRD Af Amer >60 >60 mL/min/1.73m2    GFR MDRD Non Af Amer >60 >60 mL/min/1.73m2    Bilirubin, Total 0.2 0.0 - 1.0 mg/dL    Calcium 9.3 8.5 - 10.5 mg/dL    Protein, Total 7.6 6.0 - 8.0 g/dL    Albumin 4.1 3.5 - 5.0 g/dL    Alkaline Phosphatase 61 45 - 120 U/L    AST 30 0 - 40 U/L    ALT 24 0 - 45 U/L   Sedimentation Rate   Result Value Ref Range    Sed Rate 15 0 - 20 mm/hr   C-Reactive Protein   Result Value Ref Range    CRP 0.3 0.0 - 0.8 mg/dL   HM1 (CBC with Diff)   Result Value Ref Range    WBC 7.4 4.0 - 11.0 thou/uL    RBC 4.85 3.80 - 5.40 mill/uL    Hemoglobin 14.1 12.0 - 16.0 g/dL    Hematocrit 43.0 35.0 - 47.0 %    MCV 89 80 - 100 fL    MCH 29.1 27.0 - 34.0 pg    MCHC 32.8 32.0 - 36.0 g/dL    RDW 13.5 11.0 - 14.5 %    Platelets 229 140 - 440 thou/uL    MPV 10.5 8.5 - 12.5 fL    Neutrophils % 70 50 - 70 %    Lymphocytes % 25 20 - 40 %    Monocytes % 4 2 - 10 %    Eosinophils " % 1 0 - 6 %    Basophils % 0 0 - 2 %    Immature Granulocyte % 0 <=0 %    Neutrophils Absolute 5.2 2.0 - 7.7 thou/uL    Lymphocytes Absolute 1.9 0.8 - 4.4 thou/uL    Monocytes Absolute 0.3 0.0 - 0.9 thou/uL    Eosinophils Absolute 0.1 0.0 - 0.4 thou/uL    Basophils Absolute 0.0 0.0 - 0.2 thou/uL    Immature Granulocyte Absolute 0.0 <=0.0 thou/uL   Rheumatoid Factor Quant   Result Value Ref Range    Rheumatoid Factor Quantitative <15.0 0 - 30 IU/mL     Imaging Results:    No results found.      Signed by: Gerard Campoverde MD

## 2021-06-13 NOTE — PROGRESS NOTES
ASSESSMENT AND PLAN:  Lisandra Arauz 32 y.o. female is seen here on 10/18/17 for evaluation of generalized myalgias in the background of diagnoses that she understands to be antiphospholipid syndrome associated with pulmonary embolism.  She is on Coumadin and understands that she is going to be on this for lifetime.  At one point she recalls a question of lupus came up.  At this requires further looking into.  I have recommended that we look into this further with the workup as noted.  Once these data are available further course will be charted.  I also recommended that she follows up in hematology.  We will get together in the next few weeks.        Diagnoses and all orders for this visit:    Myalgia  -     HM1(CBC and Differential)  -     Creatinine  -     ALT (SGPT)  -     Albumin  -     Rheumatoid Factor Quant  -     CCP Antibodies  -     Hepatitis C Antibody (Anti-HCV)  -     Beta-2 Glycoprotein 1 Antibodies,IgG/IgM  -     Lupus Anticoagulant  -     Complement, C'3  -     Complement, C'4  -     Erythrocyte Sedimentation Rate  -     C-Reactive Protein  -     Cytoplasmic Neutrophil Antibodies  -     Antinuclear Antibody (KATIE) Cascade  -     Hepatitis B Surface Antigen (HBsAG)  -     Cardiolipin (Phospholipid) Antibodies, IgA  -     Phospholipid Ab (Cardiolip) IgM/IgG  -     HM1 (CBC with Diff)    Pulmonary embolism  -     HM1(CBC and Differential)  -     Creatinine  -     ALT (SGPT)  -     Albumin  -     Rheumatoid Factor Quant  -     CCP Antibodies  -     Hepatitis C Antibody (Anti-HCV)  -     Beta-2 Glycoprotein 1 Antibodies,IgG/IgM  -     Lupus Anticoagulant  -     Complement, C'3  -     Complement, C'4  -     Erythrocyte Sedimentation Rate  -     C-Reactive Protein  -     Cytoplasmic Neutrophil Antibodies  -     Antinuclear Antibody (KATIE) Cascade  -     Hepatitis B Surface Antigen (HBsAG)  -     Cardiolipin (Phospholipid) Antibodies, IgA  -     Phospholipid Ab (Cardiolip) IgM/IgG  -     HM1 (CBC with  "Diff)      HISTORY OF PRESENTING ILLNESS:  Lisandra Arauz, 32 y.o., female is here for evaluation of generalized myalgias.  She carries a diagnosis of antiphospholipid syndrome.  She reports that this was discovered in 2010 when she developed pulmonary embolism in the right lower lobe.  She has been on Coumadin since.  Subsequently she recalls that she had another \"blood clot\" in her neck area.  She was seen in hematology and rheumatology.  Her only lab that we have access to from the autoimmune perspective is an KATIE in  which was negative.  She reports that since the birth of her youngest in  she has developed additional symptoms.  She noted pain which is generalized.  She hurts in her hands, elbows, knees, feet, neck and back.  Sometimes this is worse during the day other times it is worse at night.  She feels that having to take care of her on after her son this has aggravated her joint and muscle pains.  In view of her anticoagulation she cannot take nonsteroidals.  She has taken Tylenol Tylenol PM without help.  When she smokes marijuana and there is an immediate relief of tightness and pain but sometime after that comes right back.  She has never had a miscarriage.  She has a 14-year-old.  She had one .  She has no family history of lupus rheumatoid arthritis.  She reports no history of mouth ulcers, photosensitivity, pleuritic symptoms, she has not had a seizure disorder.  She does not like to be out in the sun because it makes her feel feels as if her body is getting overheated she likes to be out and it is darker.  This is a change she used to be able to suntan and go for tanning boots quite regularly in the past.  She has not had swelling of the joints.  She has noted history of kidney stones.  She has had to have surgery for her kidney stones.  There is no history of depression she carries a history of anxiety disorder.  She smokes 5 cigarettes a day.  She is unable to take alcohol " "because it makes her sick.    Further historical information and ADL limitations as noted in the multidimensional health assessment questionnaire attached in the EMR. Rest of the 13 system ROS is negative.     ALLERGIES:Compazine [prochlorperazine]; Vicodin [hydrocodone-acetaminophen]; and Tramadol    PAST MEDICAL/ACTIVE PROBLEMS/MEDICATION/ FAMILY HISTORY/SOCIAL DATA:  The patient has a family history of  Past Medical History:   Diagnosis Date     Anxiety      Blood clot of vein in shoulder area 1/2014     Chronic pain      Chronic tension headaches      Hemorrhagic ovarian cyst 8/2011    Required 5 blood transfusions per patient     Heterozygous for MTHFR gene mutation      Kidney infection      Kidney stones      Lupus      Ovarian cyst      Pulmonary embolus 7/27/10    Now on lifelong warfarin     Pyelonephritis      History   Smoking Status     Current Every Day Smoker     Packs/day: 0.50   Smokeless Tobacco     Not on file     Comment: Smokes 6-7 cigarettes per day since age 16     Patient Active Problem List   Diagnosis     Sepsis     Acute pyelonephritis     Nephrolithiasis     Ureterolithiasis     UTI (urinary tract infection)     Pulmonary embolism     Myalgia     Current Outpatient Prescriptions   Medication Sig Dispense Refill     lansoprazole (PREVACID) 30 MG capsule Take 30 mg by mouth daily.       lidocaine (LIDODERM) 5 % Remove & Discard patch within 12 hours or as directed by MD 15 patch 0     naproxen (NAPROSYN) 375 MG tablet Take 1 tablet (375 mg total) by mouth 2 (two) times a day with meals. 20 tablet 0     PARoxetine (PAXIL) 40 MG tablet Take 40 mg by mouth daily.       warfarin (COUMADIN) 5 MG tablet Take 5 mg by mouth daily.       No current facility-administered medications for this visit.        COMPREHENSIVE EXAMINATION:  Vitals:    10/18/17 0801   BP: 122/80   Pulse: 98   Resp: 10   Weight: 153 lb (69.4 kg)   Height: 5' 1\" (1.549 m)     A well appearing alert oriented female. Vital data " as noted above. Her eyes without inflammation/scleromalacia. ENTwithout oral mucositis, thrush, nasal deformity, external ear redness, deformity. Her neck is without lymphadenopathy and supple. Lungs normal sounds, no pleural rub. Heart auscultation normal rate, rhythm; no pericardial rub and murmurs. Abdomen soft, non tender, no organomegaly. Skin examined for heliotrope, malar area eruption, lupus pernio, periungual erythema, sclerodactyly, papules, erythema nodosum, purpura, nail pitting, onycholysis, and obvious psoriasis lesion. Neurological examination shows normal alertness, speech, facial symmetry, tone and power in upper and lower extremities, Tinel's and Phalen's at wrist and gait. The joint examination is performed for swelling, tenderness, warmth, erythema, and range of motion in the following joints: DIPs, PIPs, MCPs, wrists, first CMC's, elbows, shoulders, hips, knees, ankles, feet; spine for range of motion and paraspinal muscles for tenderness. The salient normal / abnormal findings are appended.  She has tenderness across the trapezius along the paraspinal region worse on the right side.  She has tenderness which is less so in the proximal and distal upper and lower extremities.  She does not have pleuropericardial rub.  There is no malar area eruption.  She does not have synovitis in any of the palpable appendicular joints.  She has tattoos.  She has no dactylitis, enthesitis.  There is no malar area eruption, and there is no periungual erythema there is no sclerodactyly.    LAB / IMAGING DATA:  ALT   Date Value Ref Range Status   07/28/2017 13 0 - 45 U/L Final   11/19/2016 8 0 - 45 U/L Final   10/10/2016 10 0 - 45 U/L Final     Albumin   Date Value Ref Range Status   07/28/2017 4.3 3.5 - 5.0 g/dL Final   11/20/2016 2.8 (L) 3.5 - 5.0 g/dL Final   11/19/2016 4.0 3.5 - 5.0 g/dL Final     Creatinine   Date Value Ref Range Status   07/28/2017 0.78 0.60 - 1.10 mg/dL Final   04/26/2017 0.77 0.60 - 1.10  mg/dL Final   11/20/2016 0.68 0.60 - 1.10 mg/dL Final       WBC   Date Value Ref Range Status   07/28/2017 6.2 4.0 - 11.0 thou/uL Final   04/26/2017 5.9 4.0 - 11.0 thou/uL Final   08/18/2014 8.5 4.0 - 11.0 thou/uL Final   08/17/2014 10.7 4.0 - 11.0 thou/uL Final     Hemoglobin   Date Value Ref Range Status   07/28/2017 15.3 12.0 - 16.0 g/dL Final   04/26/2017 13.8 12.0 - 16.0 g/dL Final   11/19/2016 13.2 12.0 - 16.0 g/dL Final     Platelets   Date Value Ref Range Status   07/28/2017 215 140 - 440 thou/uL Final   04/26/2017 243 140 - 440 thou/uL Final   11/19/2016 243 140 - 440 thou/uL Final       No results found for: KATIE, RF, SEDRATE

## 2021-06-20 NOTE — LETTER
Letter by Gerard Campoverde MD at      Author: Gerard Campoverde MD Service: -- Author Type: --    Filed:  Encounter Date: 8/28/2020 Status: (Other)       Dear Lisandra Arauz    Thank you for choosing [facility] for your care.  We are committed to providing you with the highest quality and compassionate healthcare services.  The following information pertains to your first appointment with our clinic.    Date/Time of appointment: 9/23/2020 10:45am    Note: Please arrive 30 minutes prior to your appointment time.  This allows time to complete forms, possible labs and nursing assessment.     Name of your Physician: Gerard Campoverde MD    What to bring to your appointment:    Completed Patient History/Initial Nursing Assessment and Medication/Allergy List (these forms were sent to you).    Any paperwork or films from your physician that we have asked you to bring.    Your current insurance card(s).    Parking:    Please refer to the map included to direct you.  The [facility] is located at the [place] end of [facility] in [place], [place].      After turning onto [address] from [facility], take a right turn at the first stop sign.  We have designated parking on the left, identified as parking for Cancer Care patients (Lot D).     The Code to Enter Lot D is: 0901. This code changes monthly and will always coincide with the current month followed by 01. For example August will be 0801.  The month will continue to change but the 01 will remain constant.  If lot D is full please use Parking Lot A, directly across the street.    Please enter the Cancer Care Center on the north end of the [facility].  You will see a sign on the building.        For Medical Oncology or Hematology appointments, please take the elevator to the second floor to check in.   For Radiation Oncology appointments, please go straight through the double doors and check in.     Also please note appointments can last 1.5-2  hours.      We hope these instructions are helpful to you.  If you have any questions or concerns, please call us at [phone].  It is our pleasure to assist you.    Warm Regards,    [phone]

## 2021-06-20 NOTE — LETTER
Letter by Flaquita Starr MD at      Author: Flaquita Starr MD Service: -- Author Type: --    Filed:  Encounter Date: 8/26/2020 Status: (Other)                   Office Hours: Monday - Friday 8:00 - 4:30PM    [address] [facility]uz  [address] [address]           August 26, 2020      Dear Lisandra:    Dr. Flaquita Starr placed a referral for you to be seen with hematology. Unfortunately we have been unable to reach you by phone. If you would like to schedule this please call [phone].         Sincerely,        [facility]

## 2021-06-25 DIAGNOSIS — F43.22 ADJUSTMENT DISORDER WITH ANXIOUS MOOD: ICD-10-CM

## 2021-06-25 RX ORDER — PAROXETINE 30 MG/1
30 TABLET, FILM COATED ORAL EVERY MORNING
Qty: 30 TABLET | Refills: 0 | Status: SHIPPED | OUTPATIENT
Start: 2021-06-25 | End: 2021-07-31

## 2021-06-28 NOTE — TELEPHONE ENCOUNTER
Called, no answer. Left voicemail to return call. Patient should schedule  appt to f/u mood with pcp or yellow team per Dr. Starr.

## 2021-06-29 DIAGNOSIS — D68.62 LUPUS ANTICOAGULANT SYNDROME (H): ICD-10-CM

## 2021-07-02 RX ORDER — WARFARIN SODIUM 5 MG/1
5 TABLET ORAL DAILY
Qty: 30 TABLET | Refills: 1 | Status: SHIPPED | OUTPATIENT
Start: 2021-07-02 | End: 2022-05-23

## 2021-07-28 DIAGNOSIS — F41.9 ANXIETY: ICD-10-CM

## 2021-07-28 RX ORDER — PAROXETINE 30 MG/1
30 TABLET, FILM COATED ORAL EVERY MORNING
Qty: 90 TABLET | Status: CANCELLED | OUTPATIENT
Start: 2021-07-28

## 2021-07-28 NOTE — TELEPHONE ENCOUNTER
Message to physician:     Date of last visit: 12/30/2020     Date of next visit if scheduled: none    Last Comprehensive Metabolic Panel:  Sodium   Date Value Ref Range Status   10/04/2020 139 136 - 145 mmol/L Final   10/02/2017 136.0 133.0 - 144.0 mmol/L Final     Potassium   Date Value Ref Range Status   10/04/2020 3.7 3.5 - 5.0 mmol/L Final   10/02/2017 4.8 3.4 - 5.3 mmol/L Final     Chloride   Date Value Ref Range Status   10/04/2020 106 98 - 107 mmol/L Final   10/02/2017 105.0 94.0 - 109.0 mmol/L Final     Carbon Dioxide   Date Value Ref Range Status   10/02/2017 27.0 20.0 - 32.0 mmol/L Final     Carbon Dioxide (CO2)   Date Value Ref Range Status   10/04/2020 23 22 - 31 mmol/L Final     Anion Gap   Date Value Ref Range Status   10/04/2020 10 5 - 18 mmol/L Final     Glucose   Date Value Ref Range Status   10/04/2020 80 70 - 125 mg/dL Final   10/02/2017 90.0 60.0 - 109.0 mg/dL Final     Urea Nitrogen   Date Value Ref Range Status   10/04/2020 10 8 - 22 mg/dL Final   10/02/2017 10.0 5.0 - 24.0 mg/dL Final     Creatinine   Date Value Ref Range Status   10/04/2020 0.82 0.60 - 1.10 mg/dL Final   10/02/2017 1.0 0.6 - 1.3 mg/dL Final     GFR Estimate   Date Value Ref Range Status   10/04/2020 >60 >60 mL/min/1.73m2 Final   05/31/2016 >60 >60 mL/min/1.73m2 Final     Calcium   Date Value Ref Range Status   10/04/2020 8.8 8.5 - 10.5 mg/dL Final   10/02/2017 9.5 8.5 - 10.4 mg/dL Final       BP Readings from Last 3 Encounters:   12/30/20 134/89   10/27/20 (!) 160/78   09/23/20 (!) 136/90       No results found for: A1C             Please complete refill and CLOSE ENCOUNTER.  Closing the encounter signifies the refill is complete.

## 2021-07-30 NOTE — TELEPHONE ENCOUNTER
Completely out of medication and worried will not have any for the weekend, wanting to know if  or a different provider can send medication. Please call and advise.

## 2021-07-31 ENCOUNTER — TELEPHONE (OUTPATIENT)
Dept: FAMILY MEDICINE | Facility: CLINIC | Age: 36
End: 2021-07-31

## 2021-07-31 DIAGNOSIS — F43.22 ADJUSTMENT DISORDER WITH ANXIOUS MOOD: ICD-10-CM

## 2021-07-31 RX ORDER — PAROXETINE 30 MG/1
30 TABLET, FILM COATED ORAL EVERY MORNING
Qty: 30 TABLET | Refills: 4 | Status: SHIPPED | OUTPATIENT
Start: 2021-07-31 | End: 2021-12-29

## 2021-07-31 NOTE — TELEPHONE ENCOUNTER
Received page for refill of paxil.  - last time took pill yesterday, hasn't taken today yet as she ran out.   - Will refill this med.

## 2021-12-21 DIAGNOSIS — F43.22 ADJUSTMENT DISORDER WITH ANXIOUS MOOD: ICD-10-CM

## 2021-12-21 DIAGNOSIS — Z86.711 HISTORY OF PULMONARY EMBOLISM: ICD-10-CM

## 2021-12-21 DIAGNOSIS — D68.62 LUPUS ANTICOAGULANT SYNDROME (H): ICD-10-CM

## 2021-12-21 NOTE — TELEPHONE ENCOUNTER
[facility] phone call message- medication clarification/question:    Full Medication Name: warfarin ANTICOAGULANT (COUMADIN) & PARoxetine (PAXIL)    Dose: 5 MG tablet & 30 MG tablet    Question: Patient called stating she requested through pharmacy for medication since last Friday and on her end it shows medication pending, informed patient I do not see any request from pharmacy for refill request, patient is out and needing refills . Call and advise once approved and sent     Pharmacy confirmed as [facility][id number] [address] AT [facility] 10: Yes    OK to leave a message on voice mail? Yes    Primary language: English      needed? No    Call taken on December 21, 2021 at 3:13 PM by Julia Merchant

## 2021-12-23 NOTE — TELEPHONE ENCOUNTER
Patient called back in to follow up on whether or not  has refilled her prescription that was requested a few days ago. I informed her it does not appear the refills have been sent her. I told her I will send another encounter on her behalf because she explained she's been out of both meds for the last 4-5 days already and really needs them. She also mentioned about not wanting to come in for an appt due to covid.     Please assist and provide further action.    Thank you,    Erin

## 2021-12-26 RX ORDER — WARFARIN SODIUM 5 MG/1
5 TABLET ORAL DAILY
Qty: 90 TABLET | Refills: 0 | Status: SHIPPED | OUTPATIENT
Start: 2021-12-26 | End: 2022-06-15

## 2021-12-26 RX ORDER — PAROXETINE 30 MG/1
30 TABLET, FILM COATED ORAL EVERY MORNING
Qty: 30 TABLET | OUTPATIENT
Start: 2021-12-26

## 2021-12-26 RX ORDER — WARFARIN SODIUM 5 MG/1
5 TABLET ORAL DAILY
Qty: 30 TABLET | OUTPATIENT
Start: 2021-12-26

## 2022-01-04 ENCOUNTER — TELEPHONE (OUTPATIENT)
Dept: ANTICOAGULATION | Facility: CLINIC | Age: 37
End: 2022-01-04

## 2022-01-04 ENCOUNTER — OFFICE VISIT (OUTPATIENT)
Dept: FAMILY MEDICINE | Facility: CLINIC | Age: 37
End: 2022-01-04
Payer: MEDICARE

## 2022-01-04 VITALS
OXYGEN SATURATION: 97 % | HEIGHT: 61 IN | DIASTOLIC BLOOD PRESSURE: 96 MMHG | SYSTOLIC BLOOD PRESSURE: 143 MMHG | HEART RATE: 118 BPM | BODY MASS INDEX: 33.42 KG/M2 | RESPIRATION RATE: 20 BRPM | WEIGHT: 177 LBS | TEMPERATURE: 98.7 F

## 2022-01-04 DIAGNOSIS — D68.62 LUPUS ANTICOAGULANT SYNDROME (H): Primary | ICD-10-CM

## 2022-01-04 DIAGNOSIS — Z86.711 HISTORY OF PULMONARY EMBOLISM: ICD-10-CM

## 2022-01-04 DIAGNOSIS — M62.838 MUSCLE SPASM: ICD-10-CM

## 2022-01-04 DIAGNOSIS — Z23 HIGH PRIORITY FOR 2019-NCOV VACCINE: ICD-10-CM

## 2022-01-04 DIAGNOSIS — F43.22 ADJUSTMENT DISORDER WITH ANXIOUS MOOD: ICD-10-CM

## 2022-01-04 LAB — INR PPP: 2 (ref 0.85–1.15)

## 2022-01-04 PROCEDURE — 99214 OFFICE O/P EST MOD 30 MIN: CPT | Mod: 25 | Performed by: STUDENT IN AN ORGANIZED HEALTH CARE EDUCATION/TRAINING PROGRAM

## 2022-01-04 PROCEDURE — 36416 COLLJ CAPILLARY BLOOD SPEC: CPT | Performed by: STUDENT IN AN ORGANIZED HEALTH CARE EDUCATION/TRAINING PROGRAM

## 2022-01-04 PROCEDURE — 91306 COVID-19,PF,MODERNA (18+ YRS BOOSTER .25ML): CPT | Performed by: STUDENT IN AN ORGANIZED HEALTH CARE EDUCATION/TRAINING PROGRAM

## 2022-01-04 PROCEDURE — 0064A COVID-19,PF,MODERNA (18+ YRS BOOSTER .25ML): CPT | Performed by: STUDENT IN AN ORGANIZED HEALTH CARE EDUCATION/TRAINING PROGRAM

## 2022-01-04 PROCEDURE — 85610 PROTHROMBIN TIME: CPT | Performed by: STUDENT IN AN ORGANIZED HEALTH CARE EDUCATION/TRAINING PROGRAM

## 2022-01-04 RX ORDER — TIZANIDINE HYDROCHLORIDE 4 MG/1
4 CAPSULE, GELATIN COATED ORAL 3 TIMES DAILY PRN
Qty: 10 CAPSULE | Refills: 0 | Status: SHIPPED | OUTPATIENT
Start: 2022-01-04 | End: 2022-06-15

## 2022-01-04 RX ORDER — PAROXETINE 30 MG/1
30 TABLET, FILM COATED ORAL EVERY MORNING
Qty: 90 TABLET | Refills: 3 | Status: SHIPPED | OUTPATIENT
Start: 2022-01-04 | End: 2022-12-13

## 2022-01-04 ASSESSMENT — MIFFLIN-ST. JEOR: SCORE: 1419.37

## 2022-01-04 NOTE — PROGRESS NOTES
"  Assessment & Plan   Problem List Items Addressed This Visit     Lupus anticoagulant syndrome (H) - Primary    Relevant Orders    INR (Completed)    INR    Anticoagulation Clinic Referral      Other Visit Diagnoses     Adjustment disorder with anxious mood        Relevant Medications    PARoxetine (PAXIL) 30 MG tablet    High priority for 2019-nCoV vaccine        Relevant Orders    COVID-19,PF,MODERNA (18+ Yrs BOOSTER .25mL) (Completed)    Muscle spasm        Relevant Medications    tiZANidine (ZANAFLEX) 4 MG capsule         Due for pap smear.  Prefers to schedule in the next few months.     BP elevated today   Will recheck at next visit to assess for chronic HTN    Due for COVID 19 Booster: had Pfizer for first two shots.  Recommend Moderna booster for 3rd.     Tobacco Cessation:   reports that she has been smoking cigarettes. She has never used smokeless tobacco.  Tobacco Cessation Action Plan: not ready to quit today    RTC 3 mo for female physcial     Flaquita Starr MD  [facility]    Jennifer Fry is a 36 year old who presents for the following health issues     HPI     Anxiety:   -continues Paxil  -feels like things in general have improved.  Struggles less with anxiety overall    Continues to work in the home.  Has 2 dogs.     Continues to smoke some THC and using some edibles for pain etc.     Muscle strain:   -reporting right shoulder pain extending to the neck after celebrating New Years  -Pain with neck rotation and use of the right arm  -present x 4 days.           Review of Systems   Denies SOB, N, V, CP      Objective    BP (!) 143/96   Pulse 118   Temp 98.7  F (37.1  C)   Resp 20   Ht 1.54 m (5' 0.63\")   Wt 80.3 kg (177 lb)   SpO2 97%   BMI 33.85 kg/m    Body mass index is 33.85 kg/m .  Physical Exam   GEN: NAD  HEENT: head atraumatic, normocephalic, moist mucous membranes, eyes anicteric  CV: RRR w/o M/R/G  PULM: CTAB  ABD: soft, non-tender, no " appreciable masses, no guarding, BS present  Neuro: alert and oriented x 3, CN II-XII intact, normal gross motor movements  Psych: appropriate  Ext: no peripheral edema

## 2022-01-04 NOTE — LETTER
February 3, 2022      Lisandra Arauz  [address]  [place] [address]              Dear Lisandra,    At your last office visit with  you were referred to [facility] for management of your warfarin (Coumadin ). We have attempted to reach you by phone to discuss your medication, lab test results, and adjust your dose but have not been able to reach you.    Getting your INR checked as instructed is required when you take warfarin. We would like to ensure that your warfarin dose is correct and that you are not having any side effects. It is not safe for you to be on Warfarin if your lab test, INR, is not checked regularly. If your INR is too high, serious bleeding can occur. If your INR is too low, blood clots can form and lead to heart attack, stroke or a blood clot in the lung. The correct dose of warfarin varies for each person.    Please contact us at [phone] as soon as possible. Our clinic staff is available Monday through Friday 8:00 am to 5:00 pm. If warfarin is being managed by another healthcare provider, please call and inform our staff.        Sincerely,        [facility]

## 2022-01-04 NOTE — TELEPHONE ENCOUNTER
Referral received at [facility] for warfarin and INR management.    Contact patient and left VM to discuss warfarin dosing and how long she has been on warfarin since. Per EMR appears patient started on warfarin in 2010 but chart shows inconsistencies of warfarin management.     Requested call back to [facility] to discuss.      Pete Kwon RN

## 2022-01-04 NOTE — PATIENT INSTRUCTIONS
You have a muscle strain of your right trapezius.  I recommend a massage or series of massages if his is feasible for you, heat or ice may also help, I will also send a few tabs of muscle relaxers to help with this

## 2022-01-07 NOTE — TELEPHONE ENCOUNTER
Unable to reach patient today to discuss enrollment with [facility].     ACN to follow-up next week.      Leonor Rocha RN  [facility]  [phone]

## 2022-01-10 NOTE — TELEPHONE ENCOUNTER
Message left for patient to return call to discuss ACM program and answer all questions she may have at this time.

## 2022-01-19 NOTE — TELEPHONE ENCOUNTER
Letter sent to patient regarding enrollment.     ACN to follow-up.      Leonor Rocha RN  [facility]  [phone]

## 2022-02-08 ENCOUNTER — TELEPHONE (OUTPATIENT)
Dept: ANTICOAGULATION | Facility: CLINIC | Age: 37
End: 2022-02-08
Payer: MEDICARE

## 2022-02-08 NOTE — TELEPHONE ENCOUNTER
ANTICOAGULATION     Lisandra Arauz is overdue for INR check. [facility] has been unable to reach this patient since her referral for management was placed.      Left message for patient to call and schedule lab appointment as soon as possible. If returning call, please schedule.     Sandy Wesley RN

## 2022-02-16 ENCOUNTER — TELEPHONE (OUTPATIENT)
Dept: ANTICOAGULATION | Facility: CLINIC | Age: 37
End: 2022-02-16
Payer: MEDICARE

## 2022-02-16 NOTE — TELEPHONE ENCOUNTER
ANTICOAGULATION     Lisandra Arauz is overdue for INR check.      Left message for patient to call and schedule lab appointment as soon as possible. If returning call, please schedule.     Jemima Goetz RN

## 2022-02-23 ENCOUNTER — DOCUMENTATION ONLY (OUTPATIENT)
Dept: ANTICOAGULATION | Facility: CLINIC | Age: 37
End: 2022-02-23
Payer: MEDICARE

## 2022-02-23 NOTE — LETTER
February 23, 2022      Lisandra Arauz  [address]  [place] [address]              Dear Lisandra,    You are currently under the care of [facility] for your warfarin (Coumadin ) therapy.  We are contacting you because our records show you were due for an INR on 2/1/22.    There are potentially serious risks when taking warfarin without careful monitoring and we want to make sure you are safely managed.  Routine INR monitoring is required for warfarin refills.     Please call [phone] as soon as possible to schedule an appointment.  If there has been a change in your care or other concerns, please let us know so we can help and or update our records.     Sincerely,       [facility]

## 2022-02-23 NOTE — PROGRESS NOTES
ANTICOAGULATION     Lisandra Arauz is overdue for INR check.      Reminder letter sent    Leonor Rocha RN

## 2022-03-09 ENCOUNTER — DOCUMENTATION ONLY (OUTPATIENT)
Dept: ANTICOAGULATION | Facility: CLINIC | Age: 37
End: 2022-03-09
Payer: MEDICARE

## 2022-03-09 NOTE — PROGRESS NOTES
Anticoagulation clinic notificiation    Dr. Flaquita Starr,    Your patient, Lisandra Arauz,  is past due for an INR check  The patient s last INR was 2.0 on 1/4/22 in clinic with you.     Since the referral was place, we have been unable to reach Lisnadra to discuss her warfarin and ACM.    Lisandra Arauz received phone calls and letters over the last several weeks in attempt to arrange a follow up appointment.  Lisandra Arauz will be sent another letter today.     No action is required from you unless you have additional information or if you would like to reach out personally to Lisandra Arauz.    Please don t hesitate to contact the Anticoagulation Management Program at [phone] if you have any questions or concerns.     Sincerely,     [facility]

## 2022-04-06 ENCOUNTER — TELEPHONE (OUTPATIENT)
Dept: ANTICOAGULATION | Facility: CLINIC | Age: 37
End: 2022-04-06
Payer: MEDICARE

## 2022-04-06 NOTE — TELEPHONE ENCOUNTER
ANTICOAGULATION MANAGEMENT PROGRAM    Dr Starr,     Our records indicate that Lisandra Arauz remains past due for an INR check. Lisandra Arauz was contacted multiple times over at least the last 8 weeks to attempt to arrange a follow up appointment.    Lisandra Arauz last had an INR checked on 1/4/22 which was 2.0 and was due for follow up on 2/1/22     Called patient,Left message for patient to call and schedule lab appointment as soon as possible. If returning call, please schedule.      At this time Lisandra Arauz will be moved to noncompliant status within the program. While in noncompliant status the patient would continue to be contacted every 6 weeks by the anticoagulation program to attempt to schedule an INR. You will be notified of each contact attempt to make you aware of patient's ongoing noncompliance.       Thank you,     [facility]

## 2022-05-18 ENCOUNTER — TELEPHONE (OUTPATIENT)
Dept: ANTICOAGULATION | Facility: CLINIC | Age: 37
End: 2022-05-18
Payer: MEDICARE

## 2022-05-18 NOTE — TELEPHONE ENCOUNTER
Anticoagulation Management    Lisandra Arauz is being followed by the anticoagulation clinic while in noncompliant status. Lisandra Arauz is receiving every 6 week reminder calls.     Last INR checked on 1/4/22    Called and Left message for patient to call and schedule lab appointment as soon as possible. If returning call, please schedule.

## 2022-05-20 DIAGNOSIS — D68.62 LUPUS ANTICOAGULANT SYNDROME (H): ICD-10-CM

## 2022-05-20 NOTE — TELEPHONE ENCOUNTER
[facility] phone call message- medication clarification/question:    Full Medication Name:     warfarin ANTICOAGULANT (COUMADIN)         Dose:     5 MG tablet      Question:     Patient requesting for refill. Please place refill and or call if needed. Thanks.    Pharmacy confirmed as [facility] #[id number] - [facility], [place] - [address] AT [facility] 10: Yes    OK to leave a message on voice mail? Yes    Primary language: English      needed? No    Call taken on May 20, 2022 at 1:23 PM by Erin Nuno

## 2022-05-23 ENCOUNTER — TELEPHONE (OUTPATIENT)
Dept: ANTICOAGULATION | Facility: CLINIC | Age: 37
End: 2022-05-23
Payer: MEDICARE

## 2022-05-23 DIAGNOSIS — D68.62 LUPUS ANTICOAGULANT SYNDROME (H): ICD-10-CM

## 2022-05-23 RX ORDER — WARFARIN SODIUM 5 MG/1
5 TABLET ORAL DAILY
Qty: 25 TABLET | Refills: 0 | Status: SHIPPED | OUTPATIENT
Start: 2022-05-23 | End: 2022-06-15

## 2022-05-23 RX ORDER — WARFARIN SODIUM 5 MG/1
5 TABLET ORAL DAILY
Qty: 30 TABLET | OUTPATIENT
Start: 2022-05-23

## 2022-05-23 NOTE — TELEPHONE ENCOUNTER
ANTICOAGULATION     Lisandra Arauz is overdue for INR check.      Left message for patient to call and schedule lab appointment as soon as possible. If returning call, please schedule.   Called and left message per request of MD. Message left that there is a [facility] and she can call and schedule INR there closer to her home.     Jemima Goetz RN

## 2022-06-15 ENCOUNTER — OFFICE VISIT (OUTPATIENT)
Dept: FAMILY MEDICINE | Facility: CLINIC | Age: 37
End: 2022-06-15
Payer: MEDICARE

## 2022-06-15 ENCOUNTER — ANTICOAGULATION THERAPY VISIT (OUTPATIENT)
Dept: ANTICOAGULATION | Facility: CLINIC | Age: 37
End: 2022-06-15

## 2022-06-15 VITALS
HEART RATE: 98 BPM | OXYGEN SATURATION: 98 % | HEIGHT: 61 IN | TEMPERATURE: 98.1 F | DIASTOLIC BLOOD PRESSURE: 76 MMHG | RESPIRATION RATE: 16 BRPM | WEIGHT: 177 LBS | SYSTOLIC BLOOD PRESSURE: 130 MMHG | BODY MASS INDEX: 33.42 KG/M2

## 2022-06-15 DIAGNOSIS — D68.62 LUPUS ANTICOAGULANT SYNDROME (H): Primary | ICD-10-CM

## 2022-06-15 DIAGNOSIS — Z86.711 HISTORY OF PULMONARY EMBOLISM: ICD-10-CM

## 2022-06-15 LAB — INR BLD: 1.4 (ref 0.9–1.1)

## 2022-06-15 PROCEDURE — 36416 COLLJ CAPILLARY BLOOD SPEC: CPT | Performed by: FAMILY MEDICINE

## 2022-06-15 PROCEDURE — 99213 OFFICE O/P EST LOW 20 MIN: CPT | Performed by: FAMILY MEDICINE

## 2022-06-15 PROCEDURE — 85610 PROTHROMBIN TIME: CPT | Performed by: FAMILY MEDICINE

## 2022-06-15 RX ORDER — WARFARIN SODIUM 5 MG/1
5 TABLET ORAL DAILY
Qty: 25 TABLET | Refills: 0 | Status: SHIPPED | OUTPATIENT
Start: 2022-06-15 | End: 2022-06-16

## 2022-06-15 ASSESSMENT — PATIENT HEALTH QUESTIONNAIRE - PHQ9
10. IF YOU CHECKED OFF ANY PROBLEMS, HOW DIFFICULT HAVE THESE PROBLEMS MADE IT FOR YOU TO DO YOUR WORK, TAKE CARE OF THINGS AT HOME, OR GET ALONG WITH OTHER PEOPLE: NOT DIFFICULT AT ALL
SUM OF ALL RESPONSES TO PHQ QUESTIONS 1-9: 0
SUM OF ALL RESPONSES TO PHQ QUESTIONS 1-9: 0

## 2022-06-15 ASSESSMENT — PAIN SCALES - GENERAL: PAINLEVEL: NO PAIN (0)

## 2022-06-15 NOTE — LETTER
June 17, 2022      Lisandra Arauz  [address]  [place] [address]              Dear Lisandra,    At your last office visit with  you were referred to [facility] formanagement of your warfarin (Coumadin ). We have attempted to reach you by phone to discuss your medication, lab test results, and adjust your dose but have not been able to reach you.      Your INR of 1.4 is below your goal range of 2-3 which means you may need a change to your warfarin dose. We would like to ensure that your warfarin dose is correct and that you are not having any side effects. It is not safe for you to be on warfarin if your INR is not checked regularly. If your INR is too high, serious bleeding can occur. If your INR is too low, blood clots can form and lead to heart attack, stroke or a blood clot in the lung. The correct dose ofwarfarin varies for each person.        Please contact us at [phone] as soon as possible. Our clinic staff is available Monday through Friday 8:00 am to 5:00 pm.        Sincerely,        [facility]

## 2022-06-15 NOTE — PROGRESS NOTES
Assessment & Plan     Lupus anticoagulant syndrome (H)  History of lupus anticoagulation syndrome, history of recurrent pulmonary embolism.  Patient needs to reestablish with Coumadin clinic.  She reports she is chronically on Coumadin she came in because she wants to establish with Coumadin clinic closer to home.    - warfarin ANTICOAGULANT (COUMADIN) 5 MG tablet; Take 1 tablet (5 mg) by mouth daily  - INR; Future  - Anticoagulation Clinic Referral  - INR point of care, Interfaced Result    History of pulmonary embolism    - Anticoagulation Clinic Referral      Work on weight loss  Regular exercise    No follow-ups on file.    Li Ashraf MD  [facility]    Jennifer Fry is a 37 year old,  presenting for the following health issues:    INR check today, and refer to Anticoagulant clinic.    History of Present Illness       Reason for visit:  Medication refill for warfin 5 mg    She eats 0-1 servings of fruits and vegetables daily.She consumes 1 sweetened beverage(s) daily.She exercises with enough effort to increase her heart rate 60 or more minutes per day.  She exercises with enough effort to increase her heart rate 4 days per week.   She is taking medications regularly.    Today's PHQ-9         PHQ-9 Total Score: 0    PHQ-9 Q9 Thoughts of better off dead/self-harm past 2 weeks :   Not at all    How difficult have these problems made it for you to do your work, take care of things at home, or get along with other people: Not difficult at all       Review of Systems   Constitutional, HEENT, cardiovascular, pulmonary, gi and gu systems are negative, except as otherwise noted.      Objective    There were no vitals taken for this visit.  There is no height or weight on file to calculate BMI.  Physical Exam   GENERAL: healthy, alert and no distress  PSYCH: mentation appears normal, affect normal/bright    Orders Placed This Encounter   Procedures     REVIEW OF HEALTH MAINTENANCE  PROTOCOL ORDERS     INR     INR point of care, Interfaced Result     Anticoagulation Clinic Referral     INR   Date Value Ref Range Status   06/15/2022 1.4 (H) 0.9 - 1.1 Final   01/04/2022 2.00 (H) 0.85 - 1.15 Final   02/20/2020 0.98 0.90 - 1.10 Final        Li Ashraf MD            .  ..

## 2022-06-15 NOTE — PROGRESS NOTES
"ANTICOAGULATION  MANAGEMENT: NEW REFERRAL    6/15/22 at 4:52 PM: Detailed voice message left for Lisandra with dosing instructions for tonight. ACN will need to follow-up tomorrow to finish enrollment.    6/16/22 at 2:42 PM: detailed VM left again with instruction to continue 5mg dosing and contact [facility]. Will need to follow-up with Lisandra to finish enrollment.    6/17/22 at 3:05 PM: spoke with patient and the below information was discussed/assessed  SUBJECTIVE/OBJECTIVE     Lisandra Arauz, a 37 year old female  is newly referred to [facility].    Anticoagulation:    Previously on warfarin: yes  Warfarin initiation date (approximate): reports being on warfarin for ~12 years   Indication(s): PE and Lupus Anticoagulant   Goal INR: 2.0-3.0   Anticoagulation Bridge/Overlap No   Referring provider: from PCP    General Dietary/Social Hx:    Typical vitamin K intake: moderate; consistent   Other dietary considerations: none     Social History:   Social History     Tobacco Use     Smoking status: Current Every Day Smoker     Types: Cigarettes     Smokeless tobacco: Never Used     Tobacco comment: about 6 per day   Vaping Use     Vaping Use: Never used   Substance Use Topics     Alcohol use: No     Alcohol/week: 0.0 standard drinks     Drug use: No     Types: Marijuana       In the past 2 weeks, patient estimates taking medications as instructed % of time: 80    Results:        Recent labs: (last 7 days)     06/15/22  1622   INR 1.4*       Wt Readings from Last 2 Encounters:   06/15/22 80.3 kg (177 lb)   01/04/22 80.3 kg (177 lb)      Estimated body mass index is 33.85 kg/m  as calculated from the following:    Height as of an earlier encounter on 6/15/22: 1.54 m (5' 0.63\").    Weight as of an earlier encounter on 6/15/22: 80.3 kg (177 lb).  Lab Results   Component Value Date    AST 19 10/04/2020     Lab Results   Component Value Date    CR 0.82 10/04/2020     CrCl cannot be calculated (Patient's most " "recent lab result is older than the maximum 30 days allowed.).    ASSESSMENT       Goal INR 2-3, standard for indication(s) above  Establishing initial warfarin maintenance dose (on warfarin < 30 days) Factors that may increase sensitivity to warfarin: Female gender  Factors that may reduce sensitivity to warfarin: Age <55  Potential significant drug interactions with home medications: None noted  subtherapeutic INR result   Warfarin doses taken: had been \"rationing\" her warfarin for a couple weeks as she was running low and didn't want to run out completely-- did  the Rx sent in and has now been taking 5mg daily  Diet: No new diet changes identified  New illness, injury, or hospitalization: No  Medication/supplement changes: None noted  Signs or symptoms of bleeding or clotting: No  Previous INR: unknown-- patient reports that her INR has been as high as 7 in the past but has not had her INR checked with [facility] in the past 6 months  Additional findings: reports being on warfarin for \"about 12 years\"; states she was off briefly while on xarelto a while back but it was too expensive so returned to warfarin.  Of note: patient had referral placed in January-- [facility] was unable to reach her despite multiple attempts via phone and mailed letters. She had not had an INR done since referral was placed at that time, also despite multiple attempts to reach her for reminders. Just transferred care to [place] since it [place] closer [place] her house.     PLAN     Dosing Instructions: continue your current warfarin dose with INR in 1 week       Summary  As of 6/15/2022    Full warfarin instructions:  5 mg every day   Next INR check:  6/29/2022             Education provided: Importance of following up at instructed interval and taking warfarin as instructed (stressed the risks associated with not taking the recommended dose and not having INR checked as instructed, especially since she reported having INR results as high as 7 in the past " and has a significant history of non-compliance with [facility]), Importance of notifying clinic for changes in medications; a sooner lab recheck maybe needed., Importance of notifying clinic for diarrhea, nausea/vomiting, reduced intake, and/or illness; a sooner lab recheck maybe needed., Importance of notifying clinic of upcoming surgeries and procedures 2 weeks in advance and Contact [phone]  with any changes, questions or concerns.     Education still needed: n/a-- reported not needing education so above information was just reviewed      Standing orders placed in Epic: Point of Care INR (Lab 5000)       Responsible group changed to [facility] as patient established care there this week and will be having all of her care done there.      Plan made per [facility] anticoagulation protocol       Leonor Rocha, RN  Anticoagulation Clinic  6/15/2022

## 2022-06-16 DIAGNOSIS — D68.62 LUPUS ANTICOAGULANT SYNDROME (H): ICD-10-CM

## 2022-06-20 RX ORDER — WARFARIN SODIUM 5 MG/1
5 TABLET ORAL DAILY
Qty: 14 TABLET | Refills: 0 | Status: SHIPPED | OUTPATIENT
Start: 2022-06-20 | End: 2022-08-01

## 2022-06-30 ENCOUNTER — TELEPHONE (OUTPATIENT)
Dept: FAMILY MEDICINE | Facility: CLINIC | Age: 37
End: 2022-06-30

## 2022-06-30 NOTE — TELEPHONE ENCOUNTER
ANTICOAGULATION     Lisandra Aruaz is overdue for INR check.      Left message for patient to call and schedule lab appointment as soon as possible. If returning call, please schedule.     Lorraine Santo RN

## 2022-07-19 ENCOUNTER — TELEPHONE (OUTPATIENT)
Dept: FAMILY MEDICINE | Facility: CLINIC | Age: 37
End: 2022-07-19

## 2022-07-19 NOTE — TELEPHONE ENCOUNTER
ANTICOAGULATION     Lisandra Arauz is overdue for INR check.      Left message for patient to call and schedule lab appointment as soon as possible. If returning call, please schedule.     Lorraine Santo RN

## 2022-07-26 ENCOUNTER — TELEPHONE (OUTPATIENT)
Dept: FAMILY MEDICINE | Facility: CLINIC | Age: 37
End: 2022-07-26

## 2022-07-26 NOTE — LETTER
July 26, 2022    Lisandra Arauz  [address]  [place] [address]      You are currently under the care of [facility] for your warfarin (Coumadin , Jantoven ) therapy.  We are contacting you because our records show you were due for an INR on 06/29/22.    There are potentially serious risks when taking warfarin without careful monitoring and we want to make sure you are safely managed.  Routine lab monitoring is required for warfarin refills.     Please call [phone]  as soon as possible to schedule an appointment.  If there has been a change in your care or other concerns, please let us know so we can help and or update our records.     Sincerely,       [facility]

## 2022-07-26 NOTE — TELEPHONE ENCOUNTER
ANTICOAGULATION     Lisandra Arauz is overdue for INR check.      Reminder letter sent    Bertha Wilhelm RN

## 2022-08-01 DIAGNOSIS — D68.62 LUPUS ANTICOAGULANT SYNDROME (H): Primary | ICD-10-CM

## 2022-08-01 RX ORDER — WARFARIN SODIUM 5 MG/1
5 TABLET ORAL DAILY
Qty: 30 TABLET | Refills: 0 | Status: SHIPPED | OUTPATIENT
Start: 2022-08-01 | End: 2022-10-10

## 2022-08-01 NOTE — TELEPHONE ENCOUNTER
ANTICOAGULATION MANAGEMENT:  Medication Refill    Anticoagulation Summary  As of 6/15/2022    Warfarin maintenance plan:  5 mg (5 mg x 1) every day   Next INR check:  6/29/2022   Target end date:  Indefinite    Indications    Lupus anticoagulant syndrome (H) [D68.62]  History of pulmonary embolism [Z86.711]             Anticoagulation Care Providers     Provider Role Specialty Phone number    Flaquita Starr MD Referring Family Medicine [phone]    Li Ashraf MD Referring Family Medicine [phone]          Visit with referring provider/group within last year: Yes    [facility] referral signed within last year: Yes    Lisandra does NOT meet all criteria for refill: > 2 weeks overdue for lab monitoring . 30 day paxton fill approved; patient notified to schedule per [facility] protocol    Left Message for Lisandra to schedule next INR  Hermelinda Sanz RN  Anticoagulation Clinic

## 2022-08-09 ENCOUNTER — TELEPHONE (OUTPATIENT)
Dept: ANTICOAGULATION | Facility: CLINIC | Age: 37
End: 2022-08-09

## 2022-08-09 NOTE — TELEPHONE ENCOUNTER
Anticoagulation clinic notificiation    Dr. Starr,    Your patient, Lisandra Arauz, is past due for an INR. Their last result was 1.4 on 6/15/22 and was due to come back on 6/29/22.    Lisandra Arauz received phone calls and letters over the last several weeks in attempt to arrange a follow up appointment.  Lisandra Arauz will be sent another letter today.     No action is required from you unless you have additional information or if you would like to reach out personally to Lisandra Arauz.    Please don t hesitate to contact the Anticoagulation Management Program if you have any questions or concerns.     Sincerely,     [facility]

## 2022-08-09 NOTE — LETTER
[facility]  [address] [place] [address]  Phone: [phone]  Fax: [phone]   August 9, 2022        Lisandra Arauz  [address]  [place] [address]            Dear Lisandra,    You are currently under the care of [facility] for your warfarin (Coumadin , Jantoven ) therapy.  We are contacting you because our records show you were due for an INR on 6/29/22.    There are potentially serious risks when taking warfarin without careful monitoring and we want to make sure you are safely managed.  Routine lab monitoring is required for warfarin refills.     Please call [phone]  as soon as possible to schedule an appointment.  If there has been a change in your care or other concerns, please let us know so we can help and or update our records.     Sincerely,       [facility]

## 2022-09-16 ENCOUNTER — OFFICE VISIT (OUTPATIENT)
Dept: FAMILY MEDICINE | Facility: CLINIC | Age: 37
End: 2022-09-16
Payer: MEDICARE

## 2022-09-16 VITALS
HEART RATE: 80 BPM | SYSTOLIC BLOOD PRESSURE: 126 MMHG | TEMPERATURE: 98.4 F | BODY MASS INDEX: 32.81 KG/M2 | WEIGHT: 173.8 LBS | OXYGEN SATURATION: 100 % | DIASTOLIC BLOOD PRESSURE: 84 MMHG | HEIGHT: 61 IN | RESPIRATION RATE: 12 BRPM

## 2022-09-16 DIAGNOSIS — Z12.31 ENCOUNTER FOR SCREENING MAMMOGRAM FOR MALIGNANT NEOPLASM OF BREAST: ICD-10-CM

## 2022-09-16 DIAGNOSIS — R11.10 VOMITING AND DIARRHEA: ICD-10-CM

## 2022-09-16 DIAGNOSIS — Z23 NEED FOR VACCINATION: ICD-10-CM

## 2022-09-16 DIAGNOSIS — D68.62 LUPUS ANTICOAGULANT SYNDROME (H): ICD-10-CM

## 2022-09-16 DIAGNOSIS — K21.9 GASTROESOPHAGEAL REFLUX DISEASE, UNSPECIFIED WHETHER ESOPHAGITIS PRESENT: ICD-10-CM

## 2022-09-16 DIAGNOSIS — D06.9 CERVICAL INTRAEPITHELIAL NEOPLASIA GRADE III WITH SEVERE DYSPLASIA: ICD-10-CM

## 2022-09-16 DIAGNOSIS — F43.10 PTSD (POST-TRAUMATIC STRESS DISORDER): ICD-10-CM

## 2022-09-16 DIAGNOSIS — R30.0 DYSURIA: ICD-10-CM

## 2022-09-16 DIAGNOSIS — R19.7 VOMITING AND DIARRHEA: ICD-10-CM

## 2022-09-16 DIAGNOSIS — Z86.711 HISTORY OF PULMONARY EMBOLISM: ICD-10-CM

## 2022-09-16 DIAGNOSIS — R10.2 PELVIC PAIN IN FEMALE: ICD-10-CM

## 2022-09-16 DIAGNOSIS — Z23 HIGH PRIORITY FOR 2019-NCOV VACCINE: ICD-10-CM

## 2022-09-16 DIAGNOSIS — Z01.419 ENCOUNTER FOR GYNECOLOGICAL EXAMINATION WITHOUT ABNORMAL FINDING: ICD-10-CM

## 2022-09-16 DIAGNOSIS — G89.29 OTHER CHRONIC PAIN: ICD-10-CM

## 2022-09-16 DIAGNOSIS — M79.601 PAIN OF RIGHT UPPER EXTREMITY: ICD-10-CM

## 2022-09-16 DIAGNOSIS — F41.9 ANXIETY: ICD-10-CM

## 2022-09-16 DIAGNOSIS — Z00.01 ENCOUNTER FOR GENERAL ADULT MEDICAL EXAMINATION WITH ABNORMAL FINDINGS: Primary | ICD-10-CM

## 2022-09-16 DIAGNOSIS — Z12.4 CERVICAL CANCER SCREENING: ICD-10-CM

## 2022-09-16 DIAGNOSIS — K62.89 RECTAL PAIN: ICD-10-CM

## 2022-09-16 DIAGNOSIS — R10.11 RUQ ABDOMINAL PAIN: ICD-10-CM

## 2022-09-16 LAB
ALBUMIN UR-MCNC: NEGATIVE MG/DL
APPEARANCE UR: CLEAR
BACTERIA #/AREA URNS HPF: ABNORMAL /HPF
BASOPHILS # BLD AUTO: 0 10E3/UL (ref 0–0.2)
BASOPHILS NFR BLD AUTO: 0 %
BILIRUB UR QL STRIP: NEGATIVE
COLOR UR AUTO: YELLOW
EOSINOPHIL # BLD AUTO: 0.2 10E3/UL (ref 0–0.7)
EOSINOPHIL NFR BLD AUTO: 2 %
ERYTHROCYTE [DISTWIDTH] IN BLOOD BY AUTOMATED COUNT: 13.5 % (ref 10–15)
GLUCOSE UR STRIP-MCNC: NEGATIVE MG/DL
HBA1C MFR BLD: 5.4 % (ref 0–5.6)
HCT VFR BLD AUTO: 45 % (ref 35–47)
HGB BLD-MCNC: 15.2 G/DL (ref 11.7–15.7)
HGB UR QL STRIP: ABNORMAL
KETONES UR STRIP-MCNC: NEGATIVE MG/DL
LEUKOCYTE ESTERASE UR QL STRIP: NEGATIVE
LYMPHOCYTES # BLD AUTO: 2.5 10E3/UL (ref 0.8–5.3)
LYMPHOCYTES NFR BLD AUTO: 30 %
MCH RBC QN AUTO: 29.6 PG (ref 26.5–33)
MCHC RBC AUTO-ENTMCNC: 33.8 G/DL (ref 31.5–36.5)
MCV RBC AUTO: 88 FL (ref 78–100)
MONOCYTES # BLD AUTO: 0.5 10E3/UL (ref 0–1.3)
MONOCYTES NFR BLD AUTO: 6 %
NEUTROPHILS # BLD AUTO: 5.2 10E3/UL (ref 1.6–8.3)
NEUTROPHILS NFR BLD AUTO: 63 %
NITRATE UR QL: NEGATIVE
PH UR STRIP: 5.5 [PH] (ref 5–7)
PLATELET # BLD AUTO: 212 10E3/UL (ref 150–450)
RBC # BLD AUTO: 5.13 10E6/UL (ref 3.8–5.2)
RBC #/AREA URNS AUTO: ABNORMAL /HPF
SP GR UR STRIP: 1.02 (ref 1–1.03)
SQUAMOUS #/AREA URNS AUTO: ABNORMAL /LPF
UROBILINOGEN UR STRIP-ACNC: 0.2 E.U./DL
WBC # BLD AUTO: 8.4 10E3/UL (ref 4–11)
WBC #/AREA URNS AUTO: ABNORMAL /HPF

## 2022-09-16 PROCEDURE — 0134A COVID-19,PF,MODERNA BIVALENT: CPT

## 2022-09-16 PROCEDURE — 87591 N.GONORRHOEAE DNA AMP PROB: CPT

## 2022-09-16 PROCEDURE — 87491 CHLMYD TRACH DNA AMP PROBE: CPT

## 2022-09-16 PROCEDURE — 90686 IIV4 VACC NO PRSV 0.5 ML IM: CPT

## 2022-09-16 PROCEDURE — 81001 URINALYSIS AUTO W/SCOPE: CPT

## 2022-09-16 PROCEDURE — 88175 CYTOPATH C/V AUTO FLUID REDO: CPT

## 2022-09-16 PROCEDURE — 99214 OFFICE O/P EST MOD 30 MIN: CPT | Mod: 25

## 2022-09-16 PROCEDURE — 80050 GENERAL HEALTH PANEL: CPT

## 2022-09-16 PROCEDURE — G0438 PPPS, INITIAL VISIT: HCPCS

## 2022-09-16 PROCEDURE — 36415 COLL VENOUS BLD VENIPUNCTURE: CPT

## 2022-09-16 PROCEDURE — G0008 ADMIN INFLUENZA VIRUS VAC: HCPCS

## 2022-09-16 PROCEDURE — 91313 COVID-19,PF,MODERNA BIVALENT: CPT

## 2022-09-16 PROCEDURE — 83036 HEMOGLOBIN GLYCOSYLATED A1C: CPT

## 2022-09-16 PROCEDURE — 80061 LIPID PANEL: CPT

## 2022-09-16 PROCEDURE — 87624 HPV HI-RISK TYP POOLED RSLT: CPT

## 2022-09-16 ASSESSMENT — ENCOUNTER SYMPTOMS
ARTHRALGIAS: 1
ABDOMINAL PAIN: 1
MYALGIAS: 1
HEARTBURN: 1

## 2022-09-16 ASSESSMENT — ACTIVITIES OF DAILY LIVING (ADL): CURRENT_FUNCTION: NO ASSISTANCE NEEDED

## 2022-09-16 ASSESSMENT — PAIN SCALES - GENERAL: PAINLEVEL: MILD PAIN (3)

## 2022-09-16 NOTE — PATIENT INSTRUCTIONS
Schedule OBGYN visit  Schedule follow up to discuss arm pain or go to  ortho walk in clinic.     Abdominal US (check gallbladder)  Pelvic US (check pelvic pain)  Colonoscopy - evaluate diarrhea/rectal pain  Mammogram - early screening - Family history of breast cancer in 20's    Pap - sent today will send results in 1-2 weeks.  STI - sent today results next week    LABS - results early next week  Urine sample - infection?  Cholesterol labs  Liver, Kidney, Thyroid, electrolytes  CBC - blood counts (infection? Anemia?)  Hemoglobin a1c - diabetes screening    Mental Health Referral - schedule appointment to establish care with therapist, can call insurance and find clinic/provider location you chose if you want.         Patient Education   Personalized Prevention Plan  You are due for the preventive services outlined below.  Your care team is available to assist you in scheduling these services.  If you have already completed any of these items, please share that information with your care team to update in your medical record.  Health Maintenance Due   Topic Date Due    PAP Smear  Never done    Flu Vaccine (1) 09/01/2022     Preventive Health Recommendations    See your health care provider every year to  Review health changes.   Discuss preventive care.    Review your medicines if your doctor has prescribed any.  You no longer need a yearly Pap test unless you've had an abnormal Pap test in the past 10 years. If you have vaginal symptoms, such as bleeding or discharge, be sure to talk with your provider about a Pap test.  Every 1 to 2 years, have a mammogram.  If you are over 69, talk with your health care provider about whether or not you want to continue having screening mammograms.  Every 10 years, have a colonoscopy. Or, have a yearly FIT test (stool test). These exams will check for colon cancer.   Have a cholesterol test every 5 years, or more often if your doctor advises it.   Have a diabetes test (fasting  glucose) every three years. If you are at risk for diabetes, you should have this test more often.   At age 65, have a bone density scan (DEXA) to check for osteoporosis (brittle bone disease).    Shots:  Get a flu shot each year.  Get a tetanus shot every 10 years.  Talk to your doctor about your pneumonia vaccines. There are now two you should receive - Pneumovax (PPSV 23) and Prevnar (PCV 13).  Talk to your pharmacist about the shingles vaccine.  Talk to your doctor about the hepatitis B vaccine.    Nutrition:   Eat at least 5 servings of fruits and vegetables each day.  Eat whole-grain bread, whole-wheat pasta and brown rice instead of white grains and rice.  Get adequate Calcium and Vitamin D.     Lifestyle  Exercise at least 150 minutes a week (30 minutes a day, 5 days a week). This will help you control your weight and prevent disease.  Limit alcohol to one drink per day.  No smoking.   Wear sunscreen to prevent skin cancer.   See your dentist twice a year for an exam and cleaning.  See your eye doctor every 1 to 2 years to screen for conditions such as glaucoma, macular degeneration and cataracts.    Personalized Prevention Plan  You are due for the preventive services outlined below.  Your care team is available to assist you in scheduling these services.  If you have already completed any of these items, please share that information with your care team to update in your medical record.  Health Maintenance   Topic Date Due    PAP  Never done    INFLUENZA VACCINE (1) 09/01/2022    ANNUAL REVIEW OF HM ORDERS  06/15/2023    NICOTINE/TOBACCO CESSATION COUNSELING Q 1 YR  09/16/2023    MEDICARE ANNUAL WELLNESS VISIT  09/16/2023    DTAP/TDAP/TD IMMUNIZATION (3 - Td or Tdap) 04/02/2025    ADVANCE CARE PLANNING  09/16/2027    Pneumococcal Vaccine: Pediatrics (0 to 5 Years) and At-Risk Patients (6 to 64 Years) (2 - PPSV23 or PCV20) 05/08/2049    HEPATITIS C SCREENING  Completed    HIV SCREENING  Completed    PHQ-2  (once per calendar year)  Completed    COVID-19 Vaccine  Completed    IPV IMMUNIZATION  Aged Out    MENINGITIS IMMUNIZATION  Aged Out    HEPATITIS B IMMUNIZATION  Aged Out       Understanding USDA MyPlate  The USDA has guidelines to help you make healthy food choices. These are called MyPlate. MyPlate shows the food groups that make up healthy meals using the image of a place setting. Before you eat, think about the healthiest choices for what to put on your plate or in your cup or bowl. To learn more about building a healthy plate, visit www.choosemyplate.gov.    The food groups  Fruits. Any fruit or 100% fruit juice counts as part of the Fruit Group. Fruits may be fresh, canned, frozen, or dried, and may be whole, cut-up, or pureed. Make 1/2 of your plate fruits and vegetables.  Vegetables. Any vegetable or 100% vegetable juice counts as a member of the Vegetable Group. Vegetables may be fresh, frozen, canned, or dried. They can be served raw or cooked and may be whole, cut-up, or mashed. Make 1/2 of your plate fruits and vegetables.  Grains. All foods made from grains are part of the Grains Group. These include wheat, rice, oats, cornmeal, and barley. Grains are often used to make foods such as bread, pasta, oatmeal, cereal, tortillas, and grits. Grains should be no more than 1/4 of your plate. At least half of your grains should be whole grains.  Protein. This group includes meat, poultry, seafood, beans and peas, eggs, processed soy products (such as tofu), nuts (including nut butters), and seeds. Make protein choices no more than 1/4 of your plate. Meat and poultry choices should be lean or low fat.  Dairy. The Dairy Group includes all fluid milk products and foods made from milk that contain calcium, such as yogurt and cheese. (Foods that have little calcium, such as cream, butter, and cream cheese, are not part of this group.) Most dairy choices should be low-fat or fat-free.  Oils. Oils aren't a food group,  but they do contain essential nutrients. However it's important to watch your intake of oils. These are fats that are liquid at room temperature. They include canola, corn, olive, soybean, vegetable, and sunflower oil. Foods that are mainly oil include mayonnaise, certain salad dressings, and soft margarines. You likely already get your daily oil allowance from the foods you eat.  Things to limit  Eating healthy also means limiting these things in your diet:     Salt (sodium). Many processed foods have a lot of sodium. To keep sodium intake down, eat fresh vegetables, meats, poultry, and seafood when possible. Purchase low-sodium, reduced-sodium, or no-salt-added food products at the store. And don't add salt to your meals at home. Instead, season them with herbs and spices such as dill, oregano, cumin, and paprika. Or try adding flavor with lemon or lime zest and juice.  Saturated fat. Saturated fats are most often found in animal products such as beef, pork, and chicken. They are often solid at room temperature, such as butter. To reduce your saturated fat intake, choose leaner cuts of meat and poultry. And try healthier cooking methods such as grilling, broiling, roasting, or baking. For a simple lower-fat swap, use plain nonfat yogurt instead of mayonnaise when making potato salad or macaroni salad.  Added sugars. These are sugars added to foods. They are in foods such as ice cream, candy, soda, fruit drinks, sports drinks, energy drinks, cookies, pastries, jams, and syrups. Cut down on added sugars by sharing sweet treats with a family member or friend. You can also choose fruit for dessert, and drink water or other unsweetened beverages.     [employer] last reviewed this educational content on 6/1/2020 2000-2021 [employer]. All rights reserved. This information is not intended as a substitute for professional medical care. Always follow your healthcare professional's instructions.

## 2022-09-16 NOTE — LETTER
September 29, 2022      Lisandra Arauz  [address]  [facility] [address]        Dear Lisandra,    Your lab results were mostly normal/negative.     Urine had some trace blood in it, not unexpected since you had a PAP collected that day which can cause minor bleeding, especially since you are on a blood thinner. If you are having any urinary symptoms you should schedule a follow up to discuss those in more detail and repeat a urine sample.     Your cholesterol levels were high. This can be because of lifestyle and/or genetics and increase your risk of heart disease and stroke. You should work on increasing healthy fiber (fruits and veggies, whole grains, oatmeal) in your diet, decreasing processed foods (fast food, freezer meals, friend food) and increasing physical activity. We should recheck in 6 months.     You are overdue to have your INR checked. Please schedule a lab visit.     Please let me know if you have any questions or if I can be of any additional help.     SOFIA Benedict CNP       Resulted Orders   UA macro with reflex to Microscopic and Culture - Clinc Collect   Result Value Ref Range    Color Urine Yellow Colorless, Straw, Light Yellow, Yellow    Appearance Urine Clear Clear    Glucose Urine Negative Negative mg/dL    Bilirubin Urine Negative Negative    Ketones Urine Negative Negative mg/dL    Specific Gravity Urine 1.025 1.003 - 1.035    Blood Urine Trace (A) Negative    pH Urine 5.5 5.0 - 7.0    Protein Albumin Urine Negative Negative mg/dL    Urobilinogen Urine 0.2 0.2, 1.0 E.U./dL    Nitrite Urine Negative Negative    Leukocyte Esterase Urine Negative Negative   Chlamydia trachomatis/Neisseria gonorrhoeae by PCR - Clinic Collect   Result Value Ref Range    Chlamydia Trachomatis Negative Negative      Comment:      Negative for C. trachomatis rRNA by transcription mediated amplification.   A negative result by transcription mediated amplification does not preclude the presence of  infection because results are dependent on proper and adequate collection, absence of inhibitors and sufficient rRNA to be detected.    Neisseria gonorrhoeae Negative Negative      Comment:      Negative for N. gonorrhoeae rRNA by transcription mediated amplification. A negative result by transcription mediated amplification does not preclude the presence of C. trachomatis infection because results are dependent on proper and adequate collection, absence of inhibitors and sufficient rRNA to be detected.   Hemoglobin A1c   Result Value Ref Range    Hemoglobin A1C 5.4 0.0 - 5.6 %      Comment:      Normal <5.7%   Prediabetes 5.7-6.4%    Diabetes 6.5% or higher     Note: Adopted from ADA consensus guidelines.   CBC with platelets and differential   Result Value Ref Range    WBC Count 8.4 4.0 - 11.0 10e3/uL    RBC Count 5.13 3.80 - 5.20 10e6/uL    Hemoglobin 15.2 11.7 - 15.7 g/dL    Hematocrit 45.0 35.0 - 47.0 %    MCV 88 78 - 100 fL    MCH 29.6 26.5 - 33.0 pg    MCHC 33.8 31.5 - 36.5 g/dL    RDW 13.5 10.0 - 15.0 %    Platelet Count 212 150 - 450 10e3/uL    % Neutrophils 63 %    % Lymphocytes 30 %    % Monocytes 6 %    % Eosinophils 2 %    % Basophils 0 %    Absolute Neutrophils 5.2 1.6 - 8.3 10e3/uL    Absolute Lymphocytes 2.5 0.8 - 5.3 10e3/uL    Absolute Monocytes 0.5 0.0 - 1.3 10e3/uL    Absolute Eosinophils 0.2 0.0 - 0.7 10e3/uL    Absolute Basophils 0.0 0.0 - 0.2 10e3/uL   Urine Microscopic   Result Value Ref Range    Bacteria Urine None Seen None Seen /HPF    RBC Urine 0-2 0-2 /HPF /HPF    WBC Urine 0-5 0-5 /HPF /HPF    Squamous Epithelials Urine Few (A) None Seen /LPF    Narrative    Urine Culture not indicated   Comprehensive metabolic panel   Result Value Ref Range    Sodium 138 136 - 145 mmol/L    Potassium 4.0 3.4 - 5.3 mmol/L    Chloride 102 98 - 107 mmol/L    Carbon Dioxide (CO2) 22 22 - 29 mmol/L    Anion Gap 14 7 - 15 mmol/L    Urea Nitrogen 10.9 6.0 - 20.0 mg/dL    Creatinine 0.89 0.51 - 0.95 mg/dL     Calcium 9.3 8.6 - 10.0 mg/dL    Glucose 86 70 - 99 mg/dL    Alkaline Phosphatase 51 35 - 104 U/L    AST 28 10 - 35 U/L    ALT 15 10 - 35 U/L    Protein Total 7.6 6.4 - 8.3 g/dL    Albumin 4.7 3.5 - 5.2 g/dL    Bilirubin Total 0.2 <=1.2 mg/dL    GFR Estimate 85 >60 mL/min/1.73m2      Comment:      Effective December 21, 2021 eGFRcr in adults is calculated using the 2021 CKD-EPI creatinine equation which includes age and gender (Darren et al., NEJ, DOI: 10.1056/NEXGhf0352873)   Lipid panel reflex to direct LDL Fasting   Result Value Ref Range    Cholesterol 246 (H) <200 mg/dL    Triglycerides 205 (H) <150 mg/dL    Direct Measure HDL 40 (L) >=50 mg/dL    LDL Cholesterol Calculated 165 (H) <=100 mg/dL    Non HDL Cholesterol 206 (H) <130 mg/dL    Narrative    Cholesterol  Desirable:  <200 mg/dL    Triglycerides  Normal:  Less than 150 mg/dL  Borderline High:  150-199 mg/dL  High:  200-499 mg/dL  Very High:  Greater than or equal to 500 mg/dL    Direct Measure HDL  Female:  Greater than or equal to 50 mg/dL   Male:  Greater than or equal to 40 mg/dL    LDL Cholesterol  Desirable:  <100mg/dL  Above Desirable:  100-129 mg/dL   Borderline High:  130-159 mg/dL   High:  160-189 mg/dL   Very High:  >= 190 mg/dL    Non HDL Cholesterol  Desirable:  130 mg/dL  Above Desirable:  130-159 mg/dL  Borderline High:  160-189 mg/dL  High:  190-219 mg/dL  Very High:  Greater than or equal to 220 mg/dL   TSH with free T4 reflex   Result Value Ref Range    TSH 1.61 0.30 - 4.20 uIU/mL

## 2022-09-16 NOTE — PROGRESS NOTES
"SUBJECTIVE:   Lisandra is a 37 year old who presents for Preventive Visit.      Patient has been advised of split billing requirements and indicates understanding: Yes  Are you in the first 12 months of your Medicare coverage?  No    Healthy Habits:     In general, how would you rate your overall health?  Fair    Frequency of exercise:  2-3 days/week    Duration of exercise:  Greater than 60 minutes    Do you usually eat at least 4 servings of fruit and vegetables a day, include whole grains    & fiber and avoid regularly eating high fat or \"junk\" foods?  No    Taking medications regularly:  Yes    Medication side effects:  None    Ability to successfully perform activities of daily living:  No assistance needed    Home Safety:  No safety concerns identified    Hearing Impairment:  No hearing concerns    In the past 6 months, have you been bothered by leaking of urine?  No    In general, how would you rate your overall mental or emotional health?  Good      PHQ-2 Total Score: 0    Additional concerns today:  Yes      Wt Readings from Last 5 Encounters:   09/16/22 78.8 kg (173 lb 12.8 oz)   06/15/22 80.3 kg (177 lb)   01/04/22 80.3 kg (177 lb)   12/30/20 84.8 kg (187 lb)   10/27/20 81.6 kg (180 lb)     Do you feel safe in your environment? Yes    Have you ever done Advance Care Planning? (For example, a Health Directive, POLST, or a discussion with a medical provider or your loved ones about your wishes): No, advance care planning information given to patient to review.  Advanced care planning was discussed at today's visit.     Fall risk  Fallen 2 or more times in the past year?: No  Any fall with injury in the past year?: No    Cognitive Screening   1) Repeat 3 items (Leader, Season, Table)    2) Clock draw: NORMAL  3) 3 item recall: Recalls 3 objects  Results: 3 items recalled: COGNITIVE IMPAIRMENT LESS LIKELY    Mini-CogTM Copyright S Nati. Licensed by the author for use in [employer]; reprinted with " "permission ([email or web address]@[email or web address]). All rights reserved.      Do you have sleep apnea, excessive snoring or daytime drowsiness?: no    Reviewed and updated as needed this visit by clinical staff   Tobacco  Allergies  Meds   Med Hx  Surg Hx  Fam Hx  Soc Hx        Reviewed and updated as needed this visit by Provider                   Social History     Tobacco Use     Smoking status: Current Every Day Smoker     Types: Cigarettes     Smokeless tobacco: Never Used     Tobacco comment: about 6 per day   Substance Use Topics     Alcohol use: No     Alcohol/week: 0.0 standard drinks   Has cut back.   If you drink alcohol do you typically have >3 drinks per day or >7 drinks per week? No    Alcohol Use 9/16/2022   Prescreen: >3 drinks/day or >7 drinks/week? No   Prescreen: >3 drinks/day or >7 drinks/week? -       Right wrist, tender and sore in the joint, R elbow, Rshoulder, fingers, click/clack sounds. Ongoing for 2 weeks. Worsens at night.     - no injury/trauma, + numb/tingling in pinky and ring finger, uses ice, icy/hot, tylenol, aches. Audible clacking.     Pains before menstrual cycles, has cyst on her ovaries. Hurts to urinate or have a BM because of the pressure.    - has not had eval by OBGYN for this.   - constant pain described as pressure, \"sharp\", stabbing/popping  - no vaginal bleeding  - regular periods, heavy bleeding since on coumadin - bleeds through 1 pad every 0.5   + liquid yellow diarrhea 1 hr after eating, limits her PO intake to prevent.   + RUQ pain/fullness/stabbing        Current providers sharing in care for this patient include:   Patient Care Team:  Flaquita Starr MD as PCP - General  Nilam Anaya RPH as Pharmacist (Pharmacist)  Flaquita Starr MD as Assigned PCP    The following health maintenance items are reviewed in Epic and correct as of today:  Health Maintenance   Topic Date Due     ANNUAL REVIEW OF HM ORDERS  06/15/2023     NICOTINE/TOBACCO CESSATION " "COUNSELING Q 1 YR  09/16/2023     MEDICARE ANNUAL WELLNESS VISIT  09/16/2023     DTAP/TDAP/TD IMMUNIZATION (3 - Td or Tdap) 04/02/2025     ADVANCE CARE PLANNING  09/16/2027     PAP  09/16/2027     Pneumococcal Vaccine: Pediatrics (0 to 5 Years) and At-Risk Patients (6 to 64 Years) (2 - PPSV23 or PCV20) 05/08/2049     HEPATITIS C SCREENING  Completed     HIV SCREENING  Completed     PHQ-2 (once per calendar year)  Completed     INFLUENZA VACCINE  Completed     COVID-19 Vaccine  Completed     IPV IMMUNIZATION  Aged Out     MENINGITIS IMMUNIZATION  Aged Out     HEPATITIS B IMMUNIZATION  Aged Out     Lab work is in process  Last 3 Pap and HPV Results:      FHS-7:   Breast CA Risk Assessment (FHS-7) 9/16/2022   Did any of your first-degree relatives have breast or ovarian cancer? Unknown   Did any of your relatives have bilateral breast cancer? Yes   Did any man in your family have breast cancer? No   Did any woman in your family have breast and ovarian cancer? Yes   Did any woman in your family have breast cancer before age 50 y? Yes   Do you have 2 or more relatives with breast and/or ovarian cancer? Unknown   Do you have 2 or more relatives with breast and/or bowel cancer? Unknown       Early screening given [facility] breast cancer in 20's + patient risk factors (smoking, autoimmune disease).  Pertinent mammograms are reviewed under the imaging tab.  Mammogram ordered.    Review of Systems   Gastrointestinal: Positive for abdominal pain and heartburn.   Genitourinary: Positive for pelvic pain.   Musculoskeletal: Positive for arthralgias and myalgias.   + rash  + R arm pain.   + diarrhea, vomiting,   Constitutional, HEENT, cardiovascular, pulmonary, gi and gu systems are negative, except as otherwise noted.    OBJECTIVE:   /84 (BP Location: Right arm, Patient Position: Chair, Cuff Size: Adult Regular)   Pulse 80   Temp 98.4  F (36.9  C) (Oral)   Resp 12   Ht 1.554 m (5' 1.18\")   Wt 78.8 kg (173 lb 12.8 oz)   LMP " "08/20/2022 (Exact Date)   SpO2 100%   Breastfeeding No   BMI 32.65 kg/m   Estimated body mass index is 32.65 kg/m  as calculated from the following:    Height as of this encounter: 1.554 m (5' 1.18\").    Weight as of this encounter: 78.8 kg (173 lb 12.8 oz).  Physical Exam  GENERAL: healthy, alert and no distress  HEENT: Eyes grossly normal to inspection, PERRL and conjunctivae and sclerae normal, ear nose and mouth without ulcers or lesions  NECK: no adenopathy, no asymmetry, masses, or scars and thyroid normal to palpation  RESP: lungs clear to auscultation - no rales, rhonchi or wheezes  BREAST: deferred, mammogram ordered.   CV: regular rate and rhythm, normal S1 S2, no S3 or S4, no murmur, click or rub, no peripheral edema and peripheral pulses strong  ABDOMEN: POSITIVE for tenderness throughout exam, soft, no hepatosplenomegaly, no masses and bowel sounds normal   (female): normal female external genitalia, normal urethral meatus, vaginal mucosa pink, moist, well rugated, and normal cervix/adnexa/uterus without masses or abnormal discharge. PAP collected, moderate pelvic pain/tenderness with speculum exam and pelvic pain/pressure with bimanual exam.   MS: POSITIVE for tenderness in right armno gross musculoskeletal defects noted, no edema  SKIN: POSITIVE for generalized erythematous, dry, raised maculopapular rash throughout exam, especially prevalent on inner thighs, inner/upper arms, back, and abdomen. no suspicious lesions or rashes  NEURO: Normal strength and tone, mentation intact and speech normal  PSYCH: mentation appears normal, affect normal/bright  LYMPH: no cervical, supraclavicular, axillary, or inguinal adenopathy    Diagnostic Test Results:  Labs reviewed in Epic  Results for orders placed or performed in visit on 09/16/22 (from the past 24 hour(s))   CBC with platelets and differential    Narrative    The following orders were created for panel order CBC with platelets and " differential.  Procedure                               Abnormality         Status                     ---------                               -----------         ------                     CBC with platelets and d...[[id number]]                      Final result                 Please view results for these tests on the individual orders.   Hemoglobin A1c   Result Value Ref Range    Hemoglobin A1C 5.4 0.0 - 5.6 %   CBC with platelets and differential   Result Value Ref Range    WBC Count 8.4 4.0 - 11.0 10e3/uL    RBC Count 5.13 3.80 - 5.20 10e6/uL    Hemoglobin 15.2 11.7 - 15.7 g/dL    Hematocrit 45.0 35.0 - 47.0 %    MCV 88 78 - 100 fL    MCH 29.6 26.5 - 33.0 pg    MCHC 33.8 31.5 - 36.5 g/dL    RDW 13.5 10.0 - 15.0 %    Platelet Count 212 150 - 450 10e3/uL    % Neutrophils 63 %    % Lymphocytes 30 %    % Monocytes 6 %    % Eosinophils 2 %    % Basophils 0 %    Absolute Neutrophils 5.2 1.6 - 8.3 10e3/uL    Absolute Lymphocytes 2.5 0.8 - 5.3 10e3/uL    Absolute Monocytes 0.5 0.0 - 1.3 10e3/uL    Absolute Eosinophils 0.2 0.0 - 0.7 10e3/uL    Absolute Basophils 0.0 0.0 - 0.2 10e3/uL   UA macro with reflex to Microscopic and Culture - Clinc Collect    Specimen: Urine, Clean Catch   Result Value Ref Range    Color Urine Yellow Colorless, Straw, Light Yellow, Yellow    Appearance Urine Clear Clear    Glucose Urine Negative Negative mg/dL    Bilirubin Urine Negative Negative    Ketones Urine Negative Negative mg/dL    Specific Gravity Urine 1.025 1.003 - 1.035    Blood Urine Trace (A) Negative    pH Urine 5.5 5.0 - 7.0    Protein Albumin Urine Negative Negative mg/dL    Urobilinogen Urine 0.2 0.2, 1.0 E.U./dL    Nitrite Urine Negative Negative    Leukocyte Esterase Urine Negative Negative   Urine Microscopic   Result Value Ref Range    Bacteria Urine None Seen None Seen /HPF    RBC Urine 0-2 0-2 /HPF /HPF    WBC Urine 0-5 0-5 /HPF /HPF    Squamous Epithelials Urine Few (A) None Seen /LPF    Narrative    Urine Culture not  indicated       ASSESSMENT / PLAN:   Lisandra was seen today for physical and imm/inj.  Caught up with HM as able during 1 visit.   Recommend follow up for GI/pelvic issues.  Recommend follow up for ortho pain - RUE.   Recommend routine exams 1 x annually.     Diagnoses and all orders for this visit:    (Z00.01) Encounter for general adult medical examination with abnormal findings  (primary encounter diagnosis)  Comment: pertinent screenings for patient ordered, thyroid given her anxiety and family history of thyroid disease (mom, sister), lipids and DM screening since pt has obesity and + smoker. Routine STI for sexually active female. Mammogram since pt has strong family history of early breast CA dx.   TSH with free T4 reflex,   Lipid panel reflex to direct LDL Fasting,   Hemoglobin A1c,   Chlamydia trachomatis/Neisseria gonorrhoeae by PCR -  Clinic Collect,   *MA Screening Digital Bilateral    (Z01.419) Encounter for gynecological examination without abnormal finding   Annual is recommended - High risk given increased risks of certain cancer with smoking, obesity, hx of abnormal PAP.  Plan: Screening Pelvic / Breast Exam ():         Medicare will cover a screening pelvic and breast exam every 2 years (annually if high risk)    (D68.62) Lupus anticoagulant syndrome (H)  (Z86.711) History of pulmonary embolism  Comment: Chronic, histoyr of pulmonary emobolism, on coumadin, follows with anticoagulation clinic.   Plan: CBC with platelets and differential         (F41.9) Anxiety  (G89.29) Other chronic pain  (F43.10) PTSD (post-traumatic stress disorder)  Plan: Adult Mental Health  Referral  TSH  with free T4 reflex    (K21.9) Gastroesophageal reflux disease, unspecified whether esophagitis present  Comment: Chronic, uncontrolled but not time to discuss today, takes Tums at home. Recommend visit for GI symptoms only since they are complex and multiple.    (R11.10,  R19.7) Vomiting and diarrhea  (R10.11)  RUQ abdominal pain  Comment: Chronic, unclear etiology. Ddx includes gallstones, unmanaged GERD, malignancy, colitis, infection.   Plan: CBC with platelets and differential,         Comprehensive metabolic panel (BMP + Alb, Alk         Phos, ALT, AST, Total. Bili, TP), Adult GI          Referral - Procedure Only, US Abdomen        Complete, US Pelvic Complete with Transvaginal    (R10.2) Pelvic pain in female  (K62.89) Rectal pain  (R30.0) Dysuria  Comment: Chronic, unclear etiology. Ddx includes endometriosis, malignancy, UTI, ovarian cyst, fibroid. Imaging - symptoms have worsened since prior imaging.   Plan: CBC with platelets and differential, UA macro         with reflex to Microscopic and Culture - Clinc         Collect, Adult GI  Referral -         Procedure Only, US Pelvic Complete with         Transvaginal, Ob/Gyn Referral    (Z12.4) Cervical cancer screening  (D06.9) Cervical intraepithelial neoplasia grade III with severe dysplasia  Plan: Pap Screen with HPV - recommended age 30 - 65         years, Screening Pelvic / Breast Exam ():         Medicare will cover a screening pelvic and         breast exam every 2 years (annually if high         risk)          (Z12.31) Encounter for screening mammogram for malignant neoplasm of breast   Plan: *MA Screening Digital Bilateral    (M79.601) Pain of right upper extremity  Comment: Not evaluated in detail. Recommend office visit or patient can go to ortho urgent care if pain is severe/disabling.            (Z23) Need for vaccination  Plan: INFLUENZA VACCINE IM > 6 MONTHS VALENT IIV4         (AFLURIA/FLUZONE)          (Z23) High priority for 2019-nCoV vaccine  Plan: COVID-19,PF,MODERNA BIVALENT 18+Yrs      Patient has been advised of split billing requirements and indicates understanding: Yes    COUNSELING:  Reviewed preventive health counseling, as reflected in patient instructions    Estimated body mass index is 32.65 kg/m  as calculated  "from the following:    Height as of this encounter: 1.554 m (5' 1.18\").    Weight as of this encounter: 78.8 kg (173 lb 12.8 oz).    Weight management plan: Discussed healthy diet and exercise guidelines    She reports that she has been smoking cigarettes. She has never used smokeless tobacco.  Nicotine/Tobacco Cessation Plan:   Information offered: Patient not interested at this time      Appropriate preventive services were discussed with this patient, including applicable screening as appropriate for cardiovascular disease, diabetes, osteopenia/osteoporosis, and glaucoma.  As appropriate for age/gender, discussed screening for colorectal cancer, prostate cancer, breast cancer, and cervical cancer. Checklist reviewing preventive services available has been given to the patient.    Reviewed patients plan of care and provided an AVS. The Basic Care Plan (routine screening as documented in Health Maintenance) for Lisandra meets the Care Plan requirement. This Care Plan has been established and reviewed with the Patient.    Counseling Resources:  ATP IV Guidelines  Pooled Cohorts Equation Calculator  Breast Cancer Risk Calculator  Breast Cancer: Medication to Reduce Risk  FRAX Risk Assessment  ICSI Preventive Guidelines  Dietary Guidelines for Americans, 2010  USDA's MyPlate  ASA Prophylaxis  Lung CA Screening    SOFIA Benedict CNP  [facility]    Identified Health Risks:    The patient was counseled and encouraged to consider modifying their diet and eating habits. She was provided with information on recommended healthy diet options.  "

## 2022-09-19 ENCOUNTER — TELEPHONE (OUTPATIENT)
Dept: ANTICOAGULATION | Facility: CLINIC | Age: 37
End: 2022-09-19

## 2022-09-19 LAB
ALBUMIN SERPL BCG-MCNC: 4.7 G/DL (ref 3.5–5.2)
ALP SERPL-CCNC: 51 U/L (ref 35–104)
ALT SERPL W P-5'-P-CCNC: 15 U/L (ref 10–35)
ANION GAP SERPL CALCULATED.3IONS-SCNC: 14 MMOL/L (ref 7–15)
AST SERPL W P-5'-P-CCNC: 28 U/L (ref 10–35)
BILIRUB SERPL-MCNC: 0.2 MG/DL
BUN SERPL-MCNC: 10.9 MG/DL (ref 6–20)
CALCIUM SERPL-MCNC: 9.3 MG/DL (ref 8.6–10)
CHLORIDE SERPL-SCNC: 102 MMOL/L (ref 98–107)
CHOLEST SERPL-MCNC: 246 MG/DL
CREAT SERPL-MCNC: 0.89 MG/DL (ref 0.51–0.95)
DEPRECATED HCO3 PLAS-SCNC: 22 MMOL/L (ref 22–29)
GFR SERPL CREATININE-BSD FRML MDRD: 85 ML/MIN/1.73M2
GLUCOSE SERPL-MCNC: 86 MG/DL (ref 70–99)
HDLC SERPL-MCNC: 40 MG/DL
LDLC SERPL CALC-MCNC: 165 MG/DL
NONHDLC SERPL-MCNC: 206 MG/DL
POTASSIUM SERPL-SCNC: 4 MMOL/L (ref 3.4–5.3)
PROT SERPL-MCNC: 7.6 G/DL (ref 6.4–8.3)
SODIUM SERPL-SCNC: 138 MMOL/L (ref 136–145)
TRIGL SERPL-MCNC: 205 MG/DL
TSH SERPL DL<=0.005 MIU/L-ACNC: 1.61 UIU/ML (ref 0.3–4.2)

## 2022-09-19 NOTE — TELEPHONE ENCOUNTER
ANTICOAGULATION MANAGEMENT PROGRAM    Dr. Gee,    Our records indicate that Lisandra Arauz remains past due to check INR. Lisandra Arauz was contacted multiple times over at least the last 8 weeks to attempt to arrange a follow up appointment.    Lisandra Arauz last had an INR checked on 6/15 and was due for follow up on 6/29     Called patient,Left message for patient to call and schedule lab appointment as soon as possible. If returning call, please schedule.      At this time Lisandra Arauz will be moved to noncompliant status within the program until their referral expires on 6/15/23. While in noncompliant status the patient would continue to be contacted every 6 weeks by the anticoagulation program to attempt to schedule patient. You will be notified of each contact attempt to make you aware of patient's ongoing noncompliance.        Thank you,     [facility] Anticoagulation Management Program

## 2022-09-20 LAB
C TRACH DNA SPEC QL PROBE+SIG AMP: NEGATIVE
N GONORRHOEA DNA SPEC QL NAA+PROBE: NEGATIVE

## 2022-09-21 LAB
BKR LAB AP GYN ADEQUACY: NORMAL
BKR LAB AP GYN INTERPRETATION: NORMAL
BKR LAB AP HPV REFLEX: NORMAL
BKR LAB AP LMP: NORMAL
BKR LAB AP PREVIOUS ABNL DX: NORMAL
BKR LAB AP PREVIOUS ABNORMAL: NORMAL
PATH REPORT.COMMENTS IMP SPEC: NORMAL
PATH REPORT.COMMENTS IMP SPEC: NORMAL
PATH REPORT.RELEVANT HX SPEC: NORMAL

## 2022-09-23 LAB
HUMAN PAPILLOMA VIRUS 16 DNA: NEGATIVE
HUMAN PAPILLOMA VIRUS 18 DNA: NEGATIVE
HUMAN PAPILLOMA VIRUS FINAL DIAGNOSIS: NORMAL
HUMAN PAPILLOMA VIRUS OTHER HR: NEGATIVE

## 2022-09-30 ENCOUNTER — TRANSFERRED RECORDS (OUTPATIENT)
Dept: HEALTH INFORMATION MANAGEMENT | Facility: CLINIC | Age: 37
End: 2022-09-30

## 2022-10-07 ENCOUNTER — TRANSFERRED RECORDS (OUTPATIENT)
Dept: HEALTH INFORMATION MANAGEMENT | Facility: CLINIC | Age: 37
End: 2022-10-07

## 2022-10-25 NOTE — LETTER
March 9, 2022      Lisandra Arauz  [address]  [facility] [address]              Dear Lisandra,    You are currently under the care of [facility] for your warfarin (Coumadin ) therapy.  We are contacting you because our records show you were due for an INR in January.    There are potentially serious risks when taking warfarin without careful monitoring and we want to make sure you are safely managed.  Routine INR monitoring is required for warfarin refills.     Please call [phone] as soon as possible to schedule an appointment.  If there has been a change in your care or other concerns, please let us know so we can help and or update our records.     Sincerely,       [facility]        Procedure:  VENTRAL HERNIA REPAIR (N/A Abdomen)    Relevant Problems   CARDIO   (+) Atherosclerotic heart disease of native coronary artery without angina pectoris   (+) Hypercholesterolemia      GI/HEPATIC   (+) Dysphagia, oropharyngeal phase   (+) Gastroesophageal reflux disease without esophagitis      /RENAL   (+) Acute kidney failure, unspecified (HCC)      HEMATOLOGY   (+) Iron deficiency anemia      Cardiovascular and Mediastinum   (+) Viral cardiomyopathy (HCC)      Respiratory   (+) ILD (interstitial lung disease) (HCC)      Other   (+) Sarcoidosis      Bipolar d/o  Anxiety  asthma  Physical Exam    Airway    Mallampati score: II  TM Distance: >3 FB  Neck ROM: full     Dental   No notable dental hx     Cardiovascular      Pulmonary      Other Findings         Latest Reference Range & Units 10/24/22 09:26   Sodium 135 - 147 mmol/L 138   Potassium 3 5 - 5 3 mmol/L 4 3   Chloride 96 - 108 mmol/L 103   CO2 21 - 32 mmol/L 29   Anion Gap 4 - 13 mmol/L 6   BUN 5 - 25 mg/dL 10   Creatinine 0 60 - 1 30 mg/dL 0 75   GLUCOSE FASTING 65 - 99 mg/dL 90   Calcium 8 4 - 10 2 mg/dL 9 8   AST 13 - 39 U/L 28   ALT 7 - 52 U/L 26   Alkaline Phosphatase 34 - 104 U/L 85   Total Protein 6 4 - 8 4 g/dL 7 2   Albumin 3 5 - 5 0 g/dL 4 2   TOTAL BILIRUBIN 0 20 - 1 00 mg/dL 0 31   eGFR ml/min/1 73sq m 88        Latest Reference Range & Units 10/24/22 09:26   WBC 4 31 - 10 16 Thousand/uL 6 04   Red Blood Cell Count 3 81 - 5 12 Million/uL 3 89   Hemoglobin 11 5 - 15 4 g/dL 11 6   HCT 34 8 - 46 1 % 37 0   MCV 82 - 98 fL 95   MCH 26 8 - 34 3 pg 29 8   MCHC 31 4 - 37 4 g/dL 31 4   RDW 11 6 - 15 1 % 13 8   Platelet Count 609 - 390 Thousands/uL 280   MPV 8 9 - 12 7 fL 10 9       EKG (10/2022)  NSR    ECHO (11/20221)  •  Left Ventricle: Left ventricular cavity size is normal  The left ventricular ejection fraction is 55%   Systolic function is normal  Wall motion is normal  Diastolic function is normal  Wall thickness is normal   • Right Ventricle: Right ventricular cavity size is normal  Systolic function is normal   •  Mitral Valve: There is trace regurgitation  •  Tricuspid Valve: There is trace regurgitation  Anesthesia Plan  ASA Score- 3     Anesthesia Type- general with ASA Monitors  Additional Monitors:   Airway Plan: ETT  Comment: NPO after MN  Plan Factors-Exercise tolerance (METS): >4 METS  Exercise comment: Occasional chest tightness  Chart reviewed  Existing labs reviewed  Patient summary reviewed  Patient is not a current smoker  Induction- intravenous  Postoperative Plan- Plan for postoperative opioid use  Planned trial extubation    Informed Consent- Anesthetic plan and risks discussed with patient  I personally reviewed this patient with the CRNA  Discussed and agreed on the Anesthesia Plan with the CRNA  Seble Jones

## 2022-11-03 ENCOUNTER — TELEPHONE (OUTPATIENT)
Dept: ANTICOAGULATION | Facility: CLINIC | Age: 37
End: 2022-11-03

## 2022-11-03 NOTE — TELEPHONE ENCOUNTER
Anticoagulation Management    Lisandra Arauz is being followed by the anticoagulation clinic while in noncompliant status. Lisandra Arauz is receiving every 6 week reminder calls.     Last INR checked on 6/15/22    Called and Left message for patient to call and schedule lab appointment as soon as possible. If returning call, please schedule.

## 2022-11-17 DIAGNOSIS — D68.62 LUPUS ANTICOAGULANT SYNDROME (H): ICD-10-CM

## 2022-11-21 RX ORDER — WARFARIN SODIUM 5 MG/1
5 TABLET ORAL DAILY
Qty: 15 TABLET | Refills: 0 | Status: SHIPPED | OUTPATIENT
Start: 2022-11-21 | End: 2022-12-20

## 2022-11-21 NOTE — TELEPHONE ENCOUNTER
Called patient to inform of MD instruction on medication. No answer. Left voicemail stating patient needs a follow-up appt before we can refills meds.   Will try 2x more to contact pt by telephone before sending letter to provided address.   Maeve Barr, EMT  November 21, 2022  11:26 AM

## 2022-11-25 NOTE — TELEPHONE ENCOUNTER
Called patient and went straight to . Will attempt reaching patient once more before sending letter.   Maeve Barr, EMT  November 25, 2022  11:49 AM

## 2022-12-06 DIAGNOSIS — D68.62 LUPUS ANTICOAGULANT SYNDROME (H): ICD-10-CM

## 2022-12-07 RX ORDER — WARFARIN SODIUM 5 MG/1
5 TABLET ORAL DAILY
Qty: 15 TABLET | OUTPATIENT
Start: 2022-12-07

## 2022-12-15 ENCOUNTER — TELEPHONE (OUTPATIENT)
Dept: ANTICOAGULATION | Facility: CLINIC | Age: 37
End: 2022-12-15

## 2022-12-15 DIAGNOSIS — D68.62 LUPUS ANTICOAGULANT SYNDROME (H): ICD-10-CM

## 2022-12-15 NOTE — TELEPHONE ENCOUNTER
Anticoagulation Management    Lisandra Arauz is being followed by the anticoagulation clinic while in noncompliant status. Lisandra Arauz is receiving every 6 week reminder calls.     Last INR checked on 6/15/22    Called and Left message for patient to call and schedule lab appointment as soon as possible. If returning call, please schedule.      Lorraine Santo RN - [facility]

## 2022-12-20 ENCOUNTER — LAB (OUTPATIENT)
Dept: LAB | Facility: CLINIC | Age: 37
End: 2022-12-20
Payer: MEDICARE

## 2022-12-20 ENCOUNTER — ANTICOAGULATION THERAPY VISIT (OUTPATIENT)
Dept: ANTICOAGULATION | Facility: CLINIC | Age: 37
End: 2022-12-20

## 2022-12-20 DIAGNOSIS — Z86.711 HISTORY OF PULMONARY EMBOLISM: ICD-10-CM

## 2022-12-20 DIAGNOSIS — D68.62 LUPUS ANTICOAGULANT SYNDROME (H): ICD-10-CM

## 2022-12-20 LAB — INR BLD: 1.1 (ref 0.9–1.1)

## 2022-12-20 PROCEDURE — 36416 COLLJ CAPILLARY BLOOD SPEC: CPT

## 2022-12-20 PROCEDURE — 85610 PROTHROMBIN TIME: CPT

## 2022-12-20 RX ORDER — WARFARIN SODIUM 5 MG/1
TABLET ORAL
Qty: 90 TABLET | Refills: 1 | Status: SHIPPED | OUTPATIENT
Start: 2022-12-20 | End: 2023-09-08

## 2022-12-20 RX ORDER — WARFARIN SODIUM 5 MG/1
5 TABLET ORAL DAILY
Qty: 15 TABLET | OUTPATIENT
Start: 2022-12-20

## 2022-12-20 NOTE — PROGRESS NOTES
ANTICOAGULATION MANAGEMENT     Lisandra Arauz 37 year old female is on warfarin with subtherapeutic INR result. (Goal INR 2.0-3.0)    Recent labs: (last 7 days)     12/20/22  0751   INR 1.1       ASSESSMENT       Source(s): Chart Review    Previous INR was Last INR was from 6/15/22 & was subtherapeutic.    Medication, diet, health changes since last INR chart reviewed;  Pt has not been returning calls to [facility] since we received referral from provider           PLAN     Unable to reach Lisandra today.    Left message to call back to [facility]    Follow up required to confirm warfarin dose taken and assess for changes    Pete Kwon RN  Anticoagulation Clinic  12/20/2022

## 2022-12-20 NOTE — TELEPHONE ENCOUNTER
Pt seeing elsewhere for her care. Already got Rx filled with her new PCP today. All future refills needs to go there.

## 2022-12-20 NOTE — PROGRESS NOTES
ANTICOAGULATION MANAGEMENT     Lisandra Arauz 37 year old female is on warfarin with subtherapeutic INR result. (Goal INR 2.0-3.0)    Recent labs: (last 7 days)     12/20/22  0751   INR 1.1       ASSESSMENT       Source(s): Chart Review and Patient/Caregiver Call       Warfarin doses taken: Warfarin taken as instructed    Diet: No new diet changes identified    New illness, injury, or hospitalization: No    Medication/supplement changes: None noted    Signs or symptoms of bleeding or clotting: No    Previous INR: Subtherapeutic - pt last INR was in June and was subtherapeutic    Additional findings: Refill needed today. Lisandra meets all criteria for refill (current [facility] referral, office visit with referring provider/group in last year, lab monitoring up to date or not exceeding 2 weeks overdue). Rx instructions and quantity match patient's current dosing plan. Warfarin 90 day supply with 1 refill granted per [facility] protocol     Routing chart to provider to review if they want pt to bridge with Lovenox as she is subtherapeutic and has not been consistent with [facility] follow up       PLAN     Recommended plan for temporary change(s) affecting INR     Dosing Instructions: booster dose then continue your current warfarin dose with next INR in 5-7 days       Summary  As of 12/20/2022    Full warfarin instructions:  12/20: 10 mg; Otherwise 5 mg every day; Starting 12/20/2022   Next INR check:  12/27/2022             Telephone call with Lisandra who verbalizes understanding and agrees to plan    Lab visit scheduled    Education provided:     Goal range and lab monitoring: goal range and significance of current result, Importance of therapeutic range and Importance of following up at instructed interval    Symptom monitoring: monitoring for clotting signs and symptoms and monitoring for stroke signs and symptoms    Plan made per [facility] anticoagulation protocol    Pete Kwon, RN  Anticoagulation  Clinic  12/20/2022    _______________________________________________________________________     Anticoagulation Episode Summary     Current INR goal:  2.0-3.0   TTR:  --   Target end date:  Indefinite   Send INR reminders to:  [facility]    Indications    Lupus anticoagulant syndrome (H) [D68.62]  History of pulmonary embolism [Z86.711]           Comments:           Anticoagulation Care Providers     Provider Role Specialty Phone number    Flaquita Starr MD Referring Family Medicine [phone]    Li Ashraf MD Referring Family Medicine [phone]

## 2022-12-28 ENCOUNTER — TELEPHONE (OUTPATIENT)
Dept: ANTICOAGULATION | Facility: CLINIC | Age: 37
End: 2022-12-28

## 2022-12-28 NOTE — TELEPHONE ENCOUNTER
ANTICOAGULATION     Lisandra Arauz is overdue for INR check.      Spoke with Lisandra and scheduled lab appointment on 1/4/23    Lorraine Santo RN

## 2023-01-02 DIAGNOSIS — F43.22 ADJUSTMENT DISORDER WITH ANXIOUS MOOD: ICD-10-CM

## 2023-01-06 RX ORDER — PAROXETINE 30 MG/1
30 TABLET, FILM COATED ORAL EVERY MORNING
Qty: 30 TABLET | Refills: 0 | Status: SHIPPED | OUTPATIENT
Start: 2023-01-06 | End: 2023-02-01

## 2023-01-11 ENCOUNTER — TELEPHONE (OUTPATIENT)
Dept: ANTICOAGULATION | Facility: CLINIC | Age: 38
End: 2023-01-11

## 2023-01-11 NOTE — TELEPHONE ENCOUNTER
ANTICOAGULATION     Lisandra Arauz is overdue for INR check.      Left message for patient to call and schedule lab appointment as soon as possible. If returning call, please schedule.     Hermelinda Sanz RN

## 2023-01-18 ENCOUNTER — TELEPHONE (OUTPATIENT)
Dept: ANTICOAGULATION | Facility: CLINIC | Age: 38
End: 2023-01-18
Payer: MEDICARE

## 2023-02-01 ENCOUNTER — OFFICE VISIT (OUTPATIENT)
Dept: FAMILY MEDICINE | Facility: CLINIC | Age: 38
End: 2023-02-01
Payer: MEDICARE

## 2023-02-01 VITALS
WEIGHT: 172 LBS | DIASTOLIC BLOOD PRESSURE: 80 MMHG | TEMPERATURE: 98.2 F | OXYGEN SATURATION: 98 % | BODY MASS INDEX: 32.47 KG/M2 | HEIGHT: 61 IN | SYSTOLIC BLOOD PRESSURE: 112 MMHG | HEART RATE: 86 BPM | RESPIRATION RATE: 21 BRPM

## 2023-02-01 DIAGNOSIS — D68.62 LUPUS ANTICOAGULANT SYNDROME (H): ICD-10-CM

## 2023-02-01 DIAGNOSIS — F43.22 ADJUSTMENT DISORDER WITH ANXIOUS MOOD: Primary | ICD-10-CM

## 2023-02-01 LAB — INR PPP: 1.43 (ref 0.85–1.15)

## 2023-02-01 PROCEDURE — 36415 COLL VENOUS BLD VENIPUNCTURE: CPT | Performed by: NURSE PRACTITIONER

## 2023-02-01 PROCEDURE — 85610 PROTHROMBIN TIME: CPT | Performed by: NURSE PRACTITIONER

## 2023-02-01 PROCEDURE — 99213 OFFICE O/P EST LOW 20 MIN: CPT | Performed by: NURSE PRACTITIONER

## 2023-02-01 RX ORDER — PAROXETINE 30 MG/1
30 TABLET, FILM COATED ORAL EVERY MORNING
Qty: 90 TABLET | Refills: 3 | Status: SHIPPED | OUTPATIENT
Start: 2023-02-01 | End: 2024-01-22

## 2023-02-01 RX ORDER — ONDANSETRON 4 MG/1
4-8 TABLET, ORALLY DISINTEGRATING ORAL
COMMUNITY
Start: 2021-05-22 | End: 2024-02-06

## 2023-02-01 RX ORDER — ACETAMINOPHEN 500 MG
500 TABLET ORAL
COMMUNITY

## 2023-02-01 ASSESSMENT — ANXIETY QUESTIONNAIRES
7. FEELING AFRAID AS IF SOMETHING AWFUL MIGHT HAPPEN: NOT AT ALL
1. FEELING NERVOUS, ANXIOUS, OR ON EDGE: SEVERAL DAYS
GAD7 TOTAL SCORE: 2
2. NOT BEING ABLE TO STOP OR CONTROL WORRYING: NOT AT ALL
3. WORRYING TOO MUCH ABOUT DIFFERENT THINGS: NOT AT ALL
IF YOU CHECKED OFF ANY PROBLEMS ON THIS QUESTIONNAIRE, HOW DIFFICULT HAVE THESE PROBLEMS MADE IT FOR YOU TO DO YOUR WORK, TAKE CARE OF THINGS AT HOME, OR GET ALONG WITH OTHER PEOPLE: NOT DIFFICULT AT ALL
5. BEING SO RESTLESS THAT IT IS HARD TO SIT STILL: NOT AT ALL
GAD7 TOTAL SCORE: 2
6. BECOMING EASILY ANNOYED OR IRRITABLE: SEVERAL DAYS

## 2023-02-01 ASSESSMENT — ENCOUNTER SYMPTOMS
RESPIRATORY NEGATIVE: 1
CARDIOVASCULAR NEGATIVE: 1

## 2023-02-01 ASSESSMENT — PATIENT HEALTH QUESTIONNAIRE - PHQ9
SUM OF ALL RESPONSES TO PHQ QUESTIONS 1-9: 0
5. POOR APPETITE OR OVEREATING: NOT AT ALL

## 2023-02-01 NOTE — PROGRESS NOTES
Assessment & Plan     Adjustment disorder with anxious mood  Stable. Recent CMP done. Medication refilled. Follow-up in 1 year or sooner with concerns.     - PARoxetine (PAXIL) 30 MG tablet; Take 1 tablet (30 mg) by mouth every morning    Lupus anticoagulant syndrome (H)  On chronic coumadin. INR ordered today. [facility] will follow-up with results.     - INR             Patient Instructions   INR today.     Medication refilled.       Return if symptoms worsen or fail to improve.    Eunice Montes [facility]    Jennifer Fry is a 37 year old, presenting for the following health issues:  Recheck Medication      History of Present Illness       Reason for visit:  Prescrpition refill    She eats 0-1 servings of fruits and vegetables daily.She consumes 1 sweetened beverage(s) daily.She exercises with enough effort to increase her heart rate 20 to 29 minutes per day.  She exercises with enough effort to increase her heart rate 4 days per week.   She is taking medications regularly.       Anxiety Follow-Up    How are you doing with your anxiety since your last visit? No change    Are you having other symptoms that might be associated with anxiety? No    Have you had a significant life event? No     Are you feeling depressed? No    Do you have any concerns with your use of alcohol or other drugs? No    Social History     Tobacco Use     Smoking status: Every Day     Types: Cigarettes     Smokeless tobacco: Never     Tobacco comments:     about 6 per day   Vaping Use     Vaping Use: Never used   Substance Use Topics     Alcohol use: No     Alcohol/week: 0.0 standard drinks     Drug use: No     Types: Marijuana     [place]-7 SCORE 3/12/2018 3/20/2018 2/1/2023   Total Score 16 20 2     PHQ 7/29/2019 6/15/2022 2/1/2023   PHQ-9 Total Score 10 0 0   Q9: Thoughts of better off dead/self-harm past 2 weeks Not at all Not at all Not at all       Additional provider notes: Patient presents in clinic for  med refill and INR.    Anxiety: needing refill of paxil. Stable since 2022. Recent CMP was normal.     Lupus: chronic coumadin since 2010. [facility] follows INR levels and asked patient to follow-up for repeat INR.     Allergies   Allergen Reactions     Hydrocodone-Acetaminophen Anaphylaxis     Phenothiazines      Other reaction(s): Wheezing     Prochlorperazine Anaphylaxis, Other (See Comments) and Swelling     Gabapentin Other (See Comments)     Nausea and vomiting, anxiety     Oxycodone-Acetaminophen Other (See Comments)     itching     Tramadol Other (See Comments)     Mood swings, anger.        Current Outpatient Medications   Medication     PARoxetine (PAXIL) 30 MG tablet     warfarin ANTICOAGULANT (COUMADIN) 5 MG tablet     No current facility-administered medications for this visit.       Past Medical History:   Diagnosis Date     Anemia      Antiphospholipid syndrome (H)     Resulting in multiple PE's. On Chronic warfarin currently.     Anxiety      Blood clot of vein in shoulder area 1/2014     Chronic pain      Chronic tension headaches      JEFF III with severe dysplasia     2009, had leep with normal pap smears since     Clotting disorder (H)      Generalized anxiety disorder      Gestational diabetes      Hemorrhagic ovarian cyst 8/2011    Required 5 blood transfusions per patient     Heterozygous for MTHFR gene mutation      Heterozygous for MTHFR gene mutation      Kidney infection      Kidney stone      Kidney stones      Lupus (H)      Ovarian cyst      Pre-eclampsia      PTSD (post-traumatic stress disorder)      Pulmonary embolism (H)     2010 and 2013 at [facility]. Diagnosed with antiphospholipid syndrome     Pulmonary embolus (H) 7/27/10    Now on lifelong warfarin     Pyelonephritis      Tobacco use disorder           Review of Systems   Respiratory: Negative.    Cardiovascular: Negative.    All other systems reviewed and are negative.           Objective    /80 (BP Location: Right arm, Patient  "Position: Chair, Cuff Size: Adult Regular)   Pulse 86   Temp 98.2  F (36.8  C) (Tympanic)   Resp 21   Ht 1.552 m (5' 1.12\")   Wt 78 kg (172 lb)   SpO2 98%   BMI 32.37 kg/m    Body mass index is 32.37 kg/m .  Physical Exam  Vitals reviewed.   Constitutional:       General: She is not in acute distress.     Appearance: Normal appearance. She is not ill-appearing or toxic-appearing.   Cardiovascular:      Rate and Rhythm: Normal rate and regular rhythm.      Pulses: Normal pulses.      Heart sounds: Normal heart sounds.   Pulmonary:      Effort: Pulmonary effort is normal.      Breath sounds: Normal breath sounds.   Skin:     General: Skin is warm and dry.   Neurological:      Mental Status: She is alert and oriented to person, place, and time.   Psychiatric:         Behavior: Behavior normal.                            "

## 2023-02-02 ENCOUNTER — ANTICOAGULATION THERAPY VISIT (OUTPATIENT)
Dept: ANTICOAGULATION | Facility: CLINIC | Age: 38
End: 2023-02-02
Payer: MEDICARE

## 2023-02-02 ENCOUNTER — TELEPHONE (OUTPATIENT)
Dept: FAMILY MEDICINE | Facility: CLINIC | Age: 38
End: 2023-02-02
Payer: MEDICARE

## 2023-02-02 NOTE — TELEPHONE ENCOUNTER
See [facility] encounter.    Bertha Wilhelm RN  [facility]ticoagulation [facility]

## 2023-02-02 NOTE — PROGRESS NOTES
ANTICOAGULATION MANAGEMENT     Lisandra Arauz 37 year old female is on warfarin with subtherapeutic INR result. (Goal INR 2.0-3.0)    Recent labs: (last 7 days)     02/01/23  1436   INR 1.43*       ASSESSMENT       Source(s): Chart Review and Patient/Caregiver Call       Warfarin doses taken: Missed dose(s) may be affecting INR    Diet: No new diet changes identified    New illness, injury, or hospitalization: No    Medication/supplement changes: None noted    Signs or symptoms of bleeding or clotting: No    Previous INR: Subtherapeutic    Additional findings: patient states that she skipped 2-3 doses, due to her period and it is always heavier due to warfarin.         PLAN     Recommended plan for temporary change(s) affecting INR     Dosing Instructions: booster dose then continue your current warfarin dose with next INR in 10 days       Summary  As of 2/2/2023    Full warfarin instructions:  2/2: 7.5 mg; Otherwise 5 mg every day   Next INR check:  2/13/2023             Telephone call with Lisandra who verbalizes understanding and agrees to plan    Patient elected to schedule next visit in 10 days rather than 5-7 days, states that her son is having surgery tomorrow and she is unsure of how next week will be and if she could get in.     Education provided:     Taking warfarin: importance of following [facility] instructions vs instructions on the prescription bottle and talk to MD if periods are worse with warfarin    Goal range and lab monitoring: goal range and significance of current result    Contact [phone]  with any changes, questions or concerns.     Plan made per [facility] anticoagulation protocol    Bertha Wilhelm RN  Anticoagulation Clinic  2/2/2023    _______________________________________________________________________     Anticoagulation Episode Summary     Current INR goal:  2.0-3.0   TTR:  0.0 % (1.4 mo)   Target end date:  Indefinite   Send INR reminders to:  [facility]    Indications    Lupus  anticoagulant syndrome (H) [D68.62]  History of pulmonary embolism [Z86.711]           Comments:           Anticoagulation Care Providers     Provider Role Specialty Phone number    Flaquita Starr MD Referring Family Medicine [phone]    Li Ashraf MD Referring Family Medicine [phone]

## 2023-02-02 NOTE — TELEPHONE ENCOUNTER
Patient Returning Call    Reason for call:  Returned call    Information relayed to patient:  Will have person call back    Patient has additional questions:  No    What are your questions/concerns:  Returning Bertha's call from INR, please call again    Okay to leave a detailed message?: Yes at Home number on file [phone] (home)

## 2023-02-14 ENCOUNTER — TELEPHONE (OUTPATIENT)
Dept: ANTICOAGULATION | Facility: CLINIC | Age: 38
End: 2023-02-14
Payer: MEDICARE

## 2023-02-14 NOTE — TELEPHONE ENCOUNTER
ANTICOAGULATION     Lisandra Arauz is overdue for INR check.      Left message for patient to call and schedule lab appointment as soon as possible. If returning call, please schedule.     Maral Duffy RN

## 2023-02-22 ENCOUNTER — TELEPHONE (OUTPATIENT)
Dept: ANTICOAGULATION | Facility: CLINIC | Age: 38
End: 2023-02-22
Payer: MEDICARE

## 2023-02-22 NOTE — TELEPHONE ENCOUNTER
ANTICOAGULATION     Lisandra Arauz is overdue for INR check.      Left message for patient to call and schedule lab appointment as soon as possible. If returning call, please schedule.     Bertha Wilhelm RN

## 2023-03-01 ENCOUNTER — DOCUMENTATION ONLY (OUTPATIENT)
Dept: ANTICOAGULATION | Facility: CLINIC | Age: 38
End: 2023-03-01
Payer: MEDICARE

## 2023-03-01 NOTE — PROGRESS NOTES
ANTICOAGULATION     Lisandra Arauz is overdue for INR check.      Reminder letter sent    Maral Duffy RN

## 2023-03-01 NOTE — LETTER
March 1, 2023        Lisandra Arauz  [address]  [place] [facility] [address]            Dear Lisandra,    You are currently under the care of [facility]ticoagulation [facility] for your warfarin (Coumadin , Jantoven ) therapy.  We are contacting you because our records show you were due for an INR on 2/13/23.    There are potentially serious risks when taking warfarin without careful monitoring and we want to make sure you are safely managed.  Routine lab monitoring is required for warfarin refills.     Please call [phone]  as soon as possible to schedule an appointment.  If there has been a change in your care or other concerns, please let us know so we can help and or update our records.     Sincerely,       [facility]tico[facility]

## 2023-03-15 ENCOUNTER — TELEPHONE (OUTPATIENT)
Dept: ANTICOAGULATION | Facility: CLINIC | Age: 38
End: 2023-03-15
Payer: MEDICARE

## 2023-03-15 NOTE — LETTER
[facility] ANTICOAGULATION [facility]INIC  [address] [facility] [address]  Phone: [phone]  Fax: [phone]   March 15, 2023        Lisandra Arauz  [address]  [place] [address]            Dear Lisandra,    You are currently under the care of [facility] Anticoagulation Management Program for your warfarin (Coumadin , Jantoven ) therapy.  We are contacting you because our records show you were due for an INR on 2/13/23.    There are potentially serious risks when taking warfarin without careful monitoring and we want to make sure you are safely managed.  Routine lab monitoring is required for warfarin refills.     Please call [phone]  as soon as possible to schedule an appointment.  If there has been a change in your care or other concerns, please let us know so we can help and or update our records.     Sincerely,       [facility] Anticoagulation Management Program

## 2023-03-15 NOTE — TELEPHONE ENCOUNTER
.accAnticoagulation clinic notificiation    DR. Starr,    Your patient, Lisandra Arauz, is past due for an INR. Their last result was 1.43 on 2/1/23 and was due to come back on 2/13/23.    Lisandra Arauz received phone calls and letters over the last several weeks in attempt to arrange a follow up appointment.  Lisandra Arauz will be sent another letter today.     No action is required from you unless you have additional information or if you would like to reach out personally to Lisandra Arauz.    Please don t hesitate to contact the Anticoagulation Management Program if you have any questions or concerns.     Sincerely,     [facility] Anticoagulation Management Program

## 2023-04-11 ENCOUNTER — PATIENT OUTREACH (OUTPATIENT)
Dept: CARE COORDINATION | Facility: CLINIC | Age: 38
End: 2023-04-11
Payer: MEDICARE

## 2023-04-11 NOTE — PROGRESS NOTES
Clinic Care Coordination Contact    Follow Up Progress Note      Assessment:  The pt was recently in the ED, I called to check up on the pt and help the pt setup a ED follow up. The pt was at [facility] for finger injury. I called the pt, but got her vm,so I left a vm for the pt to give me a call back.    Care Gaps:    Health Maintenance Due   Topic Date Due     HEPATITIS B IMMUNIZATION (3 of 3 - 19+ 3-dose series) 06/14/2015           Care Plans      Intervention/Education provided during outreach:               Plan:     Care Coordinator will follow up in

## 2023-04-12 ENCOUNTER — TELEPHONE (OUTPATIENT)
Dept: ANTICOAGULATION | Facility: CLINIC | Age: 38
End: 2023-04-12
Payer: MEDICARE

## 2023-04-12 ENCOUNTER — PATIENT OUTREACH (OUTPATIENT)
Dept: CARE COORDINATION | Facility: CLINIC | Age: 38
End: 2023-04-12
Payer: MEDICARE

## 2023-04-12 ENCOUNTER — TRANSFERRED RECORDS (OUTPATIENT)
Dept: HEALTH INFORMATION MANAGEMENT | Facility: CLINIC | Age: 38
End: 2023-04-12
Payer: MEDICARE

## 2023-04-12 NOTE — TELEPHONE ENCOUNTER
ANTICOAGULATION     Lisandra Arauz is overdue for INR check.      Left message for patient to call and schedule lab appointment as soon as possible. If returning call, please schedule.     Maral PITTS RN

## 2023-04-12 NOTE — PROGRESS NOTES
Clinic Care Coordination Contact    Follow Up Progress Note      Assessment: The pt was recently in the ED, I called to check up on the pt and help the pt setup a ED follow up. The pt was at [facility] for finger injury. I called the pt, but got her vm,so I left a vm for the pt to give me a call back.    Care Gaps:    Health Maintenance Due   Topic Date Due     HEPATITIS B IMMUNIZATION (3 of 3 - 19+ 3-dose series) 06/14/2015           Care Plans      Intervention/Education provided during outreach:               Plan:     Care Coordinator will follow up in

## 2023-04-12 NOTE — LETTER
[facility] ANTICOAGULATION CLINIC  [address] [facility] [address]  Phone: [phone]  Fax: [phone]     April 12, 2023        Lisandra Arauz  [address]  [place] [address]            Dear Lisandra,    You are currently under the care of [facility] Anticoagulation Management Program for your warfarin (Coumadin , Jantoven ) therapy.  We are contacting you because our records show you were due for an INR on 2/13/23.    There are potentially serious risks when taking warfarin without careful monitoring and we want to make sure you are safely managed.  Routine lab monitoring is required for warfarin refills.     Please call [phone]  as soon as possible to schedule an appointment.  If there has been a change in your care or other concerns, please let us know so we can help and or update our records.     Sincerely,       [facility] Anticoagulation Management [facility]

## 2023-04-12 NOTE — TELEPHONE ENCOUNTER
ANTICOAGULATION MANAGEMENT PROGRAM    Dr. Starr,     Our records indicate that Lisandra Arauz remains past due to check INR. Lisandra Arauz was contacted multiple times over at least the last 8 weeks to attempt to arrange a follow up appointment.    Lisandra Arauz last had an INR checked on 2/2/23 and was due for follow up on 2/13/23     Called patient,Reminder letter sent     At this time Lisandra Arauz will be moved to noncompliant status within the program until their referral expires on 6/15/23. While in noncompliant status the patient would continue to be contacted every 6 weeks by the anticoagulation program to attempt to schedule patient. You will be notified of each contact attempt to make you aware of patient's ongoing noncompliance.        Thank you,     [facility] Anticoagulation Management Program

## 2023-04-13 ENCOUNTER — PATIENT OUTREACH (OUTPATIENT)
Dept: CARE COORDINATION | Facility: CLINIC | Age: 38
End: 2023-04-13
Payer: MEDICARE

## 2023-04-13 NOTE — PROGRESS NOTES
Clinic Care Coordination Contact    Follow Up Progress Note      Assessment: The pt was recently in the ED, I called to check up on the pt and help the pt setup a ED follow up. The pt was at [facility] for finger injury. I called the pt, but got her vm,so I left a vm for the pt to give me a call back.    Care Gaps:    Health Maintenance Due   Topic Date Due     HEPATITIS B IMMUNIZATION (3 of 3 - 19+ 3-dose series) 06/14/2015           Care Plans      Intervention/Education provided during outreach:               Plan:     Care Coordinator will follow up in

## 2023-05-17 ENCOUNTER — TELEPHONE (OUTPATIENT)
Dept: ANTICOAGULATION | Facility: CLINIC | Age: 38
End: 2023-05-17
Payer: MEDICARE

## 2023-05-17 DIAGNOSIS — D68.62 LUPUS ANTICOAGULANT SYNDROME (H): Primary | ICD-10-CM

## 2023-05-17 RX ORDER — WARFARIN SODIUM 5 MG/1
TABLET ORAL
Qty: 90 TABLET | Refills: 1 | Status: CANCELLED | OUTPATIENT
Start: 2023-05-17

## 2023-05-17 NOTE — TELEPHONE ENCOUNTER
Fax: Nicolás Warfarin Refill Request.    Chart reviewed for warfarin refill request.    INR checked within past 90 days, most recently on 2/2/23. Last OV on 2/1/23. Referral UTD, last renewed on 6/15/22   Current sig checked and adjusted to match dosing from last INR check note.    Warfarin unable to be refilled per ACM protocol due to no INR check in last 90 days. Routing to provider for review..     Patient managed by [facility], and is in non compliant status at this time.    Marylin Menchaca RN   Anticoagulation Clinic

## 2023-05-22 NOTE — TELEPHONE ENCOUNTER
Called pt's emergency contact (Dianne) LM for pt to call back for appt. Needs OV for recheck INR and med refills per Dr. Starr.

## 2023-05-24 ENCOUNTER — TELEPHONE (OUTPATIENT)
Dept: ANTICOAGULATION | Facility: CLINIC | Age: 38
End: 2023-05-24

## 2023-05-24 NOTE — TELEPHONE ENCOUNTER
ANTICOAGULATION     Lisandra Arauz is overdue for INR check.      Left message for patient to call and schedule lab appointment as soon as possible. If returning call, please schedule.  and Reminder letter sent    Maral PITTS RN

## 2023-05-24 NOTE — TELEPHONE ENCOUNTER
Anticoagulation Management    Lisandra Arauz is being followed by the anticoagulation clinic while in noncompliant status. Lisandra Arauz is receiving every 6 week reminder calls.     Last INR checked on 2/2/23    Called and Left message for patient to call and schedule lab appointment as soon as possible. If returning call, please schedule.  and Reminder letter sent

## 2023-05-24 NOTE — LETTER
[facility] ANTICOAGULATION CLINIC  [address] [facility] [address]  Phone: [phone]  Fax: [phone]   May 24, 2023        Lisandra Arauz  [address]  [facility] [address]            Dear Lisandra,    You are currently under the care of [facility] Anticoagulation Management Program for your warfarin (Coumadin , Jantoven ) therapy.  We are contacting you because our records show you were due for an INR on 2/13/23.    There are potentially serious risks when taking warfarin without careful monitoring and we want to make sure you are safely managed.  Routine lab monitoring is required for warfarin refills.     Please call [phone]  as soon as possible to schedule an appointment.  If there has been a change in your care or other concerns, please let us know so we can help and or update our records.     Sincerely,       [facility] Anticoagulation Management Program

## 2023-06-16 ENCOUNTER — TELEPHONE (OUTPATIENT)
Dept: ANTICOAGULATION | Facility: CLINIC | Age: 38
End: 2023-06-16
Payer: MEDICARE

## 2023-06-16 NOTE — LETTER
[facility] ANTICOAGULATION CLINIC  [address] [facility] [address]  Phone: [phone]  Fax: [phone]   June 16, 2023        Lisandra Arauz  [address]  [facility] [address]            Dear Lisandra,    You have been under the care of the [facility] Anticoagulation Management Program for monitoring of your warfarin (Coumadin , Jantoven )  for your PE/Lupus syndrome.  Our records indicate that you are either past due to have your blood work checked or have not followed clinic staff recommendations. Your last INR was on 2/2/23.     We regret to inform you that since you have not responded to our phone calls and letters the [facility] Anticoagulation Management Program will no longer be able to manage your warfarin therapy. To ensure safe use of your warfarin and to receive additional warfarin refills you will need to make an office visit with Dr. Starr office as soon as possible to discuss your warfarin therapy. Please call [phone] or [phone] to schedule an appointment.       Sincerely,       [facility] Anticoagulation Management Program

## 2023-06-16 NOTE — TELEPHONE ENCOUNTER
ANTICOAGULATION  MANAGEMENT    Lisandra Arauz is being discharged from the [facility] Anticoagulation Management Program (ACC).    Reason for discharge: non-compliance with Anticoagulation Management Program despite outreach.  Last  INR was done on 2/2/23. Letter mailed to patient advising them to schedule visit with referring provider.       Anticoagulation episode resolved, ACC referral closed and Standing order discontinued    Patient may be accepted back in ACC program after an office visit to discuss non-compliance and check INR. Patient must be agreeable to follow the monitoring recommendations of the program and will need a new referral to be re-enrolled.    Tavon Monsivais RN

## 2023-09-06 ENCOUNTER — DOCUMENTATION ONLY (OUTPATIENT)
Dept: FAMILY MEDICINE | Facility: CLINIC | Age: 38
End: 2023-09-06

## 2023-09-06 DIAGNOSIS — Z86.711 HISTORY OF PULMONARY EMBOLISM: Primary | ICD-10-CM

## 2023-09-06 DIAGNOSIS — Z79.01 LONG TERM CURRENT USE OF ANTICOAGULANT THERAPY: ICD-10-CM

## 2023-09-06 NOTE — PROGRESS NOTES
This patient has a future lab only appointment tomorrow 09/07/2023 and needs orders for INR. It looks as though she was released from the INR program for non compliance. Please send orders. Thanks [facility]

## 2023-09-06 NOTE — PROGRESS NOTES
INR ordered. Patient's PCP is Doug. Please help her schedule follow-up appt with Doug to manage future INR.     Thanks,  Eunice Montes DNP, NP-C

## 2023-09-07 ENCOUNTER — LAB (OUTPATIENT)
Dept: LAB | Facility: CLINIC | Age: 38
End: 2023-09-07
Payer: MEDICARE

## 2023-09-07 DIAGNOSIS — Z79.01 LONG TERM CURRENT USE OF ANTICOAGULANT THERAPY: ICD-10-CM

## 2023-09-07 DIAGNOSIS — Z86.711 HISTORY OF PULMONARY EMBOLISM: ICD-10-CM

## 2023-09-07 LAB
HOLD SPECIMEN: NORMAL
INR PPP: 2.32 (ref 0.85–1.15)

## 2023-09-07 PROCEDURE — 85610 PROTHROMBIN TIME: CPT

## 2023-09-07 PROCEDURE — 36415 COLL VENOUS BLD VENIPUNCTURE: CPT

## 2023-09-07 NOTE — PROGRESS NOTES
Called patient and left a detailed voicemail message relaying Eunice Montes's message below.    ABDULKADIR Guzman  [facility]

## 2023-09-08 DIAGNOSIS — D68.62 LUPUS ANTICOAGULANT SYNDROME (H): ICD-10-CM

## 2023-09-08 RX ORDER — WARFARIN SODIUM 5 MG/1
TABLET ORAL
Qty: 14 TABLET | Refills: 0 | Status: SHIPPED | OUTPATIENT
Start: 2023-09-08 | End: 2023-09-20

## 2023-09-08 NOTE — TELEPHONE ENCOUNTER
Attempt #1 to call patient.     RN left voicemail and requested return call to [facility] at [phone].     Sita Manuel RN  [facility] [place]

## 2023-09-08 NOTE — TELEPHONE ENCOUNTER
Medication pended for 14 day dose (patient can get further refills with her appt next week). Last INR goal documented on 2/2/23 was 2-3. Her current INR is within that range.     Please see what pharmacy she would like to use.     Thanks,  Eunice Montes, LANRE, NP-C

## 2023-09-08 NOTE — TELEPHONE ENCOUNTER
Routing to Eunice Montes.    Patient took last Warfarin yesterday.    Needs refill today.    RN received call from patient.    Patient verbalized understanding.    Patient is in need of Warfarin.    Benjamin Trivedi, RN, BSN, PHN  [facility]

## 2023-09-08 NOTE — TELEPHONE ENCOUNTER
----- Message from Eunice Montes DNP sent at 9/8/2023  8:07 AM CDT -----  Lab stable. She has appt with Dr. Bush next week. Can you see if she needs any refills to get her through until that appointment? Otherwise, they can discuss refills and future labs at that appointment.     Thanks!  Eunice Montes DNP, NP-C

## 2023-09-20 ENCOUNTER — ANTICOAGULATION THERAPY VISIT (OUTPATIENT)
Dept: ANTICOAGULATION | Facility: CLINIC | Age: 38
End: 2023-09-20

## 2023-09-20 ENCOUNTER — OFFICE VISIT (OUTPATIENT)
Dept: FAMILY MEDICINE | Facility: CLINIC | Age: 38
End: 2023-09-20
Payer: MEDICARE

## 2023-09-20 VITALS
OXYGEN SATURATION: 98 % | SYSTOLIC BLOOD PRESSURE: 122 MMHG | HEIGHT: 63 IN | RESPIRATION RATE: 20 BRPM | HEART RATE: 94 BPM | BODY MASS INDEX: 30.3 KG/M2 | WEIGHT: 171 LBS | DIASTOLIC BLOOD PRESSURE: 68 MMHG | TEMPERATURE: 98.8 F

## 2023-09-20 DIAGNOSIS — Z86.711 HISTORY OF PULMONARY EMBOLISM: ICD-10-CM

## 2023-09-20 DIAGNOSIS — D68.62 LUPUS ANTICOAGULANT SYNDROME (H): Primary | ICD-10-CM

## 2023-09-20 DIAGNOSIS — L30.9 ECZEMA, UNSPECIFIED TYPE: ICD-10-CM

## 2023-09-20 DIAGNOSIS — Z72.0 TOBACCO USE: ICD-10-CM

## 2023-09-20 PROCEDURE — 99213 OFFICE O/P EST LOW 20 MIN: CPT | Performed by: FAMILY MEDICINE

## 2023-09-20 RX ORDER — TRIAMCINOLONE ACETONIDE 1 MG/G
CREAM TOPICAL
Qty: 80 G | Refills: 3 | Status: SHIPPED | OUTPATIENT
Start: 2023-09-20

## 2023-09-20 RX ORDER — WARFARIN SODIUM 5 MG/1
TABLET ORAL
Qty: 90 TABLET | Refills: 1 | Status: SHIPPED | OUTPATIENT
Start: 2023-09-20 | End: 2024-03-27

## 2023-09-20 ASSESSMENT — PAIN SCALES - GENERAL: PAINLEVEL: NO PAIN (0)

## 2023-09-20 NOTE — PROGRESS NOTES
"  Assessment & Plan     Lupus anticoagulant syndrome (H)  INR at 2.32  Continue with current Coumadin dose  Referred to anticoagulant clinic.  - Anticoagulation Clinic Referral  - warfarin ANTICOAGULANT (COUMADIN) 5 MG tablet; Take 5mg of warfarin all other days or as directed by the anticoagulation clinic    Eczema, unspecified type  Use as needed  - triamcinolone (KENALOG) 0.1 % external cream; Apply bid for 5- 7 days, then bid as needed    Tobacco use  Not ready to quite at this time.    Declined vaccination today.    Nicotine/Tobacco Cessation:  She reports that she has been smoking cigarettes. She has been exposed to tobacco smoke. She has never used smokeless tobacco.  Nicotine/Tobacco Cessation Plan:   Advised pt to quite, not ready to quite at this time.      BMI:   Estimated body mass index is 29.92 kg/m  as calculated from the following:    Height as of this encounter: 1.61 m (5' 3.39\").    Weight as of this encounter: 77.6 kg (171 lb).   Weight management plan: Discussed healthy diet and exercise guidelines      Jennifer Fry is a 38 year old, presenting for the following health issues:  Establish Care  Patient chronically on Coumadin history of lupus anticoagulant syndrome, history of recurrent pulmonary embolism.  She denies chest pain or short of breath.  She does have dry skin rashes her lower extremity, lower extremity.  Sometimes itches.    She continues to smoke, she is not ready to quit.        9/20/2023     1:05 PM   Additional Questions   Roomed by Hugo GOODSON CMA   Accompanied by Self         9/20/2023     1:05 PM   Patient Reported Additional Medications   Patient reports taking the following new medications None       History of Present Illness       Reason for visit:  Inr check up    She eats 2-3 servings of fruits and vegetables daily.She consumes 1 sweetened beverage(s) daily.She exercises with enough effort to increase her heart rate 30 to 60 minutes per day.  She exercises with " "enough effort to increase her heart rate 4 days per week. She is missing 1 dose(s) of medications per week.  She is not taking prescribed medications regularly due to remembering to take.       Review of Systems   Constitutional, HEENT, cardiovascular, pulmonary, gi and gu systems are negative, except as otherwise noted.      Objective    /68 (BP Location: Right arm, Patient Position: Chair, Cuff Size: Adult Large)   Pulse 94   Temp 98.8  F (37.1  C) (Tympanic)   Resp 20   Ht 1.61 m (5' 3.39\")   Wt 77.6 kg (171 lb)   LMP 08/24/2023   SpO2 98%   BMI 29.92 kg/m    Body mass index is 29.92 kg/m .  Physical Exam   GENERAL: healthy, alert and no distress  SKIN: xerosis -  upper arms, other part   PSYCH: mentation appears normal, affect normal/bright    Orders Placed This Encounter   Procedures    REVIEW OF HEALTH MAINTENANCE PROTOCOL ORDERS    Anticoagulation Clinic Referral     INR   Date Value Ref Range Status   09/07/2023 2.32 (H) 0.85 - 1.15 Final   02/20/2020 0.98 0.90 - 1.10 Final           Li Ashraf MD              "

## 2023-09-20 NOTE — PROGRESS NOTES
"ANTICOAGULATION  MANAGEMENT: NEW REFERRAL      SUBJECTIVE/OBJECTIVE     Lisandra Arauz, a 38 year old female  is newly referred to [facility] Anticoagulation [facility].    Anticoagulation:    Previously on warfarin: Yes,  previously managed but discharged due to non-compliance.  Approximate previous dose: 5 mg daily.  Warfarin initiation date (approximate): 8/5/2010   Indication(s): Lupus Anticoagulant   Goal Range: 2.0-3.0   Anticoagulation Bridge/Overlap: No   Referring provider: from PCP    General Dietary/Social Hx:    Typical vitamin K intake: low; variable     Other dietary considerations: None     Social History: Typical alcohol intake: rare  Smokes Cigarettes    In the past 2 weeks, patient estimates taking medications as instructed % of time: She is not taking prescribed medications regularly due to remembering to take.     Results:        No results for input(s): INR, [id number], F2, ALMWH in the last 168 hours.    Wt Readings from Last 2 Encounters:   09/20/23 77.6 kg (171 lb)   02/01/23 78 kg (172 lb)      Estimated body mass index is 29.92 kg/m  as calculated from the following:    Height as of an earlier encounter on 9/20/23: 1.61 m (5' 3.39\").    Weight as of an earlier encounter on 9/20/23: 77.6 kg (171 lb).  Lab Results   Component Value Date    AST 28 09/16/2022     Lab Results   Component Value Date    CR 0.89 09/16/2022     CrCl cannot be calculated (Patient's most recent lab result is older than the maximum 365 days allowed.).    ASSESSMENT     Goal INR 2-3, standard for indication(s) above  On warfarin > 30 days; maintenance dose has been established  Maintenance dose established prior to referral    PLAN     Dosing Instructions: Continue your current warfarin dose with INR in 4 weeks       Summary  As of 9/20/2023      Full warfarin instructions:  5 mg every day   Next INR check:  10/4/2023               Education provided:   Please call back if any changes to your diet, medications or " how you've been taking warfarin  Contact [phone]  with any changes, questions or concerns.           Detailed voice message left for Lisandra with dosing instructions and follow up date.     Contact [phone]  to schedule and with any changes, questions or concerns.     Standing orders placed in Epic: Point of Care INR (Lab 5000)    Plan made per [facility] anticoagulation protocol    Maral PITTS RN  Anticoagulation Clinic  9/20/2023

## 2023-10-06 ENCOUNTER — TELEPHONE (OUTPATIENT)
Dept: ANTICOAGULATION | Facility: CLINIC | Age: 38
End: 2023-10-06
Payer: MEDICARE

## 2023-10-06 NOTE — TELEPHONE ENCOUNTER
ANTICOAGULATION     Lisandra Arauz is overdue for INR check.      Left message for patient to call and schedule lab appointment as soon as possible. If returning call, please schedule.     Tavon Monsivais RN

## 2023-10-13 ENCOUNTER — TELEPHONE (OUTPATIENT)
Dept: ANTICOAGULATION | Facility: CLINIC | Age: 38
End: 2023-10-13
Payer: MEDICARE

## 2023-10-13 NOTE — TELEPHONE ENCOUNTER
ANTICOAGULATION     Lisandra Arauz is overdue for an INR check.      Spoke with Lisandra and scheduled lab appointment on 10/19/23    Maral Duffy, RN, BSN, PHN  Anticoagulation Clinic   [phone]

## 2023-10-23 ENCOUNTER — TELEPHONE (OUTPATIENT)
Dept: ANTICOAGULATION | Facility: CLINIC | Age: 38
End: 2023-10-23
Payer: MEDICARE

## 2023-10-23 NOTE — LETTER
[facility] ANTICOAGULATION CLINIC  [address] [facility] [address]  Phone: [phone]  Fax: [phone]   October 23, 2023        Lisandra Arauz  [address]  [facility] [address]            Dear Lisandra,    You are currently under the care of [facility] Anticoagulation [facility] for your warfarin (Coumadin , Jantoven ) therapy.  We are contacting you because our records show you were due for an INR on 10/4/23.    There are potentially serious risks when taking warfarin without careful monitoring and we want to make sure you are safely managed.  Routine lab monitoring is required for warfarin refills.     Please call [phone]  as soon as possible to schedule an appointment.  If there has been a change in your care or other concerns, please let us know so we can help and/or update our records.     Sincerely,       [facility] Anticoagulation [facility]

## 2023-10-23 NOTE — TELEPHONE ENCOUNTER
ANTICOAGULATION     Lisandra Arauz is overdue for an INR check.      Reminder letter sent    Bertha Wilhelm RN

## 2023-11-07 ENCOUNTER — TELEPHONE (OUTPATIENT)
Dept: ANTICOAGULATION | Facility: CLINIC | Age: 38
End: 2023-11-07
Payer: MEDICARE

## 2023-11-07 NOTE — TELEPHONE ENCOUNTER
Anticoagulation Clinic Notification    Lisandra, is past due for an INR. Their last result was 2.32 on 9/7/23 and was due to come back on 10/4/23.    she received phone calls and letters over the last several weeks in attempt to arrange follow up labs. Lisandra Arauz will be contacted again today.     Please contact patient directly to discuss compliance with monitoring or schedule visit to review ongoing anticoagulation therapy.    Thank you,     [facility] Anticoagulation Clinic

## 2023-12-05 ENCOUNTER — TELEPHONE (OUTPATIENT)
Dept: ANTICOAGULATION | Facility: CLINIC | Age: 38
End: 2023-12-05
Payer: MEDICARE

## 2023-12-05 NOTE — TELEPHONE ENCOUNTER
ANTICOAGULATION MANAGEMENT PROGRAM    Dr. Ashraf,     Our records indicate that Lisandra Arauz remains past due to check an INR. Lisandra Arauz was contacted multiple times over at least the last 8 weeks to attempt to arrange a follow up appointment.    Lisandra Arauz last had an an INR checked on 9/7/23 and was due for follow up on 10/4/23     Called patient,Left message for patient to call and schedule lab appointment as soon as possible. If returning call, please schedule.      At this time Lisandra Arauz will be moved to noncompliant status within the program until their referral expires on 9/20/24. While in noncompliant status the patient would continue to be contacted every 6 weeks by the anticoagulation program to attempt to schedule patient. You will be notified of each contact attempt to make you aware of patient's ongoing noncompliance.        Thank you,     [facility] Anticoagulation Management Program

## 2024-01-16 ENCOUNTER — TELEPHONE (OUTPATIENT)
Dept: ANTICOAGULATION | Facility: CLINIC | Age: 39
End: 2024-01-16
Payer: MEDICARE

## 2024-01-16 NOTE — TELEPHONE ENCOUNTER
Anticoagulation Management    Lisandra Arauz is being followed by the anticoagulation clinic while in noncompliant status. Lisandra Arauz is receiving every 6 week reminder calls.     Last INR checked on 9/7/23    Called and Left message for patient to call and schedule lab appointment as soon as possible. If returning call, please schedule.

## 2024-01-22 DIAGNOSIS — F43.22 ADJUSTMENT DISORDER WITH ANXIOUS MOOD: ICD-10-CM

## 2024-01-22 RX ORDER — PAROXETINE 30 MG/1
30 TABLET, FILM COATED ORAL EVERY MORNING
Qty: 90 TABLET | Refills: 0 | Status: SHIPPED | OUTPATIENT
Start: 2024-01-22 | End: 2024-02-06

## 2024-01-22 NOTE — TELEPHONE ENCOUNTER
Routing to PCP    Patient is wondering if she can get a paxton refill of her paroxetine. She will run out on 2/4 and her appt is on 2/6    Willing to send?    Martha Fry RN

## 2024-01-22 NOTE — TELEPHONE ENCOUNTER
Medication Question or Refill    Contacts         Type Contact Phone/Fax    01/22/2024 10:52 AM CST Phone (Incoming) Lisandra Arauz (Self) [phone] (H)            What medication are you calling about (include dose and sig)?: paroxetine 30 mg     Preferred Pharmacy:   [id number] - [place], [place] - [address] AT [facility] [address]  [address] [place] [facility]  [place] [address]  Phone: [phone] Fax: [phone]      Controlled Substance Agreement on file:   CSA -- Patient Level:    CSA: None found at the patient level.       Who prescribed the medication?: PCP    Do you need a refill? Yes    When did you use the medication last? Pt will be out of meds on 2/4 but has an appt for 2/6 & is needing meds for time frame missing     Patient offered an appointment? Pt scheduled on 2/6    Do you have any questions or concerns?  No      Okay to leave a detailed message?: Yes at Cell number on file:    Telephone Information:   Mobile [phone]

## 2024-02-06 ENCOUNTER — OFFICE VISIT (OUTPATIENT)
Dept: FAMILY MEDICINE | Facility: CLINIC | Age: 39
End: 2024-02-06
Payer: MEDICARE

## 2024-02-06 VITALS
RESPIRATION RATE: 18 BRPM | HEIGHT: 61 IN | DIASTOLIC BLOOD PRESSURE: 77 MMHG | SYSTOLIC BLOOD PRESSURE: 110 MMHG | TEMPERATURE: 98.4 F | OXYGEN SATURATION: 98 % | BODY MASS INDEX: 31.6 KG/M2 | WEIGHT: 167.4 LBS | HEART RATE: 89 BPM

## 2024-02-06 DIAGNOSIS — R30.0 DYSURIA: Primary | ICD-10-CM

## 2024-02-06 DIAGNOSIS — N76.0 BACTERIAL VAGINOSIS: ICD-10-CM

## 2024-02-06 DIAGNOSIS — F43.22 ADJUSTMENT DISORDER WITH ANXIOUS MOOD: ICD-10-CM

## 2024-02-06 DIAGNOSIS — Z79.01 LONG TERM (CURRENT) USE OF ANTICOAGULANTS: ICD-10-CM

## 2024-02-06 DIAGNOSIS — B96.89 BACTERIAL VAGINOSIS: ICD-10-CM

## 2024-02-06 DIAGNOSIS — N89.8 VAGINAL ITCHING: ICD-10-CM

## 2024-02-06 DIAGNOSIS — D68.62 LUPUS ANTICOAGULANT SYNDROME (H): ICD-10-CM

## 2024-02-06 PROBLEM — O00.101 RIGHT TUBAL PREGNANCY WITHOUT INTRAUTERINE PREGNANCY: Status: ACTIVE | Noted: 2020-12-22

## 2024-02-06 LAB
ALBUMIN UR-MCNC: 30 MG/DL
APPEARANCE UR: CLEAR
BACTERIA #/AREA URNS HPF: ABNORMAL /HPF
BILIRUB UR QL STRIP: NEGATIVE
CLUE CELLS: PRESENT
COLOR UR AUTO: YELLOW
GLUCOSE UR STRIP-MCNC: NEGATIVE MG/DL
HGB UR QL STRIP: NEGATIVE
KETONES UR STRIP-MCNC: NEGATIVE MG/DL
LEUKOCYTE ESTERASE UR QL STRIP: NEGATIVE
NITRATE UR QL: NEGATIVE
PH UR STRIP: >=9 [PH] (ref 5–7)
RBC #/AREA URNS AUTO: ABNORMAL /HPF
SP GR UR STRIP: 1.01 (ref 1–1.03)
SQUAMOUS #/AREA URNS AUTO: ABNORMAL /LPF
TRICHOMONAS, WET PREP: ABNORMAL
UROBILINOGEN UR STRIP-ACNC: 0.2 E.U./DL
WBC #/AREA URNS AUTO: ABNORMAL /HPF
WBC'S/HIGH POWER FIELD, WET PREP: ABNORMAL
YEAST, WET PREP: ABNORMAL

## 2024-02-06 PROCEDURE — 99213 OFFICE O/P EST LOW 20 MIN: CPT | Performed by: PHYSICIAN ASSISTANT

## 2024-02-06 PROCEDURE — 81001 URINALYSIS AUTO W/SCOPE: CPT | Performed by: PHYSICIAN ASSISTANT

## 2024-02-06 PROCEDURE — 87210 SMEAR WET MOUNT SALINE/INK: CPT | Performed by: PHYSICIAN ASSISTANT

## 2024-02-06 PROCEDURE — 96127 BRIEF EMOTIONAL/BEHAV ASSMT: CPT | Performed by: PHYSICIAN ASSISTANT

## 2024-02-06 RX ORDER — METRONIDAZOLE 7.5 MG/G
1 GEL VAGINAL DAILY
Qty: 25 G | Refills: 0 | Status: SHIPPED | OUTPATIENT
Start: 2024-02-06 | End: 2024-02-11

## 2024-02-06 RX ORDER — PAROXETINE 30 MG/1
30 TABLET, FILM COATED ORAL EVERY MORNING
Qty: 90 TABLET | Refills: 3 | Status: SHIPPED | OUTPATIENT
Start: 2024-04-01

## 2024-02-06 ASSESSMENT — ANXIETY QUESTIONNAIRES
GAD7 TOTAL SCORE: 1
6. BECOMING EASILY ANNOYED OR IRRITABLE: NOT AT ALL
1. FEELING NERVOUS, ANXIOUS, OR ON EDGE: NOT AT ALL
7. FEELING AFRAID AS IF SOMETHING AWFUL MIGHT HAPPEN: NOT AT ALL
IF YOU CHECKED OFF ANY PROBLEMS ON THIS QUESTIONNAIRE, HOW DIFFICULT HAVE THESE PROBLEMS MADE IT FOR YOU TO DO YOUR WORK, TAKE CARE OF THINGS AT HOME, OR GET ALONG WITH OTHER PEOPLE: SOMEWHAT DIFFICULT
3. WORRYING TOO MUCH ABOUT DIFFERENT THINGS: NOT AT ALL
5. BEING SO RESTLESS THAT IT IS HARD TO SIT STILL: NOT AT ALL
2. NOT BEING ABLE TO STOP OR CONTROL WORRYING: SEVERAL DAYS
GAD7 TOTAL SCORE: 1
8. IF YOU CHECKED OFF ANY PROBLEMS, HOW DIFFICULT HAVE THESE MADE IT FOR YOU TO DO YOUR WORK, TAKE CARE OF THINGS AT HOME, OR GET ALONG WITH OTHER PEOPLE?: SOMEWHAT DIFFICULT
4. TROUBLE RELAXING: NOT AT ALL
7. FEELING AFRAID AS IF SOMETHING AWFUL MIGHT HAPPEN: NOT AT ALL
GAD7 TOTAL SCORE: 1

## 2024-02-06 ASSESSMENT — PATIENT HEALTH QUESTIONNAIRE - PHQ9
SUM OF ALL RESPONSES TO PHQ QUESTIONS 1-9: 1
SUM OF ALL RESPONSES TO PHQ QUESTIONS 1-9: 1
10. IF YOU CHECKED OFF ANY PROBLEMS, HOW DIFFICULT HAVE THESE PROBLEMS MADE IT FOR YOU TO DO YOUR WORK, TAKE CARE OF THINGS AT HOME, OR GET ALONG WITH OTHER PEOPLE: NOT DIFFICULT AT ALL

## 2024-02-06 ASSESSMENT — PAIN SCALES - GENERAL: PAINLEVEL: MODERATE PAIN (4)

## 2024-02-06 NOTE — PROGRESS NOTES
Assessment & Plan     Bacterial vaginosis  Wet prep positive for clue cells indicating BV. Discussed with patient that is it unclear if this is what is causing her symptoms.  Her urine was mostly unremarkable without sides of infection. Further evaluation for symptoms with CT scan to rule out appendicitis and kidney stone was suggested to patient today but patient declined as her pain is improving. She will continue to watch and wait. If symptoms do not get better with medication for BV then she will contact clinic and further evaluation can be done. I am okay with this plan as patient is stable in clinic with normal vital signs and in no acute distress. However, did educated patient on warning signs to go to ER. Patient agree's with this plan and has no further questions  - metroNIDAZOLE (METROGEL) 0.75 % vaginal gel; Place 1 applicator (5 g) vaginally daily for 5 days    Dysuria  See plan above  - UA with Microscopic reflex to Culture - lab collect; Future  - UA with Microscopic reflex to Culture - lab collect  - UA Microscopic with Reflex to Culture    Vaginal itching  - Wet prep - Clinic Collect    Adjustment disorder with anxious mood  Chronic, stable, continue with current treatment regimen  - PARoxetine (PAXIL) 30 MG tablet; Take 1 tablet (30 mg) by mouth every morning    Lupus anticoagulant syndrome (H24)  - INR; Future    Long term (current) use of anticoagulants  - INR; Future            Subjective   Lisandra is a 38 year old, presenting for the following health issues:  Recheck Medication and UTI    History of Present Illness       Mental Health Follow-up:  Patient presents to follow-up on Anxiety.    Patient's anxiety since last visit has been:  Good  The patient is not having other symptoms associated with anxiety.  Any significant life events: No  Patient is not feeling anxious or having panic attacks.  Patient has no concerns about alcohol or drug use.    Reason for visit:  Urinary Tract Infection and  "itching  Symptom onset:  1-3 days ago  Symptoms include:  Painful urination, pelvic and lower back pain.  Symptom intensity:  Mild  Symptom progression:  Staying the same  Had these symptoms before:  Yes    She eats 2-3 servings of fruits and vegetables daily.She consumes 1 sweetened beverage(s) daily.She exercises with enough effort to increase her heart rate 30 to 60 minutes per day.  She exercises with enough effort to increase her heart rate 3 or less days per week.   She is taking medications regularly.         3/20/2018     2:00 PM 2/1/2023     2:25 PM 2/6/2024     7:40 AM   [place]-7 SCORE   Total Score   1 (minimal anxiety)   Total Score 20 2 1        Additional provider notes :   Symptoms began 5 days ago. She had slightly pain in her right lower pelvics and some right flank pain. Also bladder pain when it is full. Pain with urinating. Some vaginal itching. Rectal discomfort with pushing to have a stool. She did monistat OTC, tylenol, heat pads, hot showers and baths, and marijuana which kind of helps. Some nausea. No fevers.           Review of Systems  Constitutional, neuro, ENT, endocrine, pulmonary, cardiac, gastrointestinal, genitourinary, musculoskeletal, integument and psychiatric systems are negative, except as otherwise noted.      Objective    /77   Pulse 89   Temp 98.4  F (36.9  C) (Oral)   Resp 18   Ht 1.555 m (5' 1.22\")   Wt 75.9 kg (167 lb 6.4 oz)   LMP 01/23/2024 (Exact Date)   SpO2 98%   BMI 31.40 kg/m    Body mass index is 31.4 kg/m .  Physical Exam   GENERAL: alert and no distress  NECK: no adenopathy, no asymmetry, masses, or scars  RESP: lungs clear to auscultation - no rales, rhonchi or wheezes  CV: regular rate and rhythm, normal S1 S2, no S3 or S4, no murmur, click or rub, no peripheral edema  ABDOMEN: soft, no organomegaly or masses, bowel sounds normal, and tender to palpation to RLQ and right flank  MS: no gross musculoskeletal defects noted, no edema  BACK: right CVA " tenderness, no paralumbar tenderness    Results for orders placed or performed in visit on 02/06/24   UA with Microscopic reflex to Culture - lab collect     Status: Abnormal    Specimen: Urine, Clean Catch   Result Value Ref Range    Color Urine Yellow Colorless, Straw, Light Yellow, Yellow    Appearance Urine Clear Clear    Glucose Urine Negative Negative mg/dL    Bilirubin Urine Negative Negative    Ketones Urine Negative Negative mg/dL    Specific Gravity Urine 1.015 1.003 - 1.035    Blood Urine Negative Negative    pH Urine >=9.0 (H) 5.0 - 7.0    Protein Albumin Urine 30 (A) Negative mg/dL    Urobilinogen Urine 0.2 0.2, 1.0 E.U./dL    Nitrite Urine Negative Negative    Leukocyte Esterase Urine Negative Negative   UA Microscopic with Reflex to Culture     Status: Abnormal   Result Value Ref Range    Bacteria Urine Few (A) None Seen /HPF    RBC Urine 0-2 0-2 /HPF /HPF    WBC Urine 0-5 0-5 /HPF /HPF    Squamous Epithelials Urine Many (A) None Seen /LPF    Narrative    Urine Culture not indicated   Wet prep - Clinic Collect     Status: Abnormal    Specimen: Vagina; Swab   Result Value Ref Range    Trichomonas Absent Absent    Yeast Absent Absent    Clue Cells Present (A) Absent    WBCs/high power field 1+ (A) None           Signed Electronically by: SUSAN Boothe

## 2024-02-27 ENCOUNTER — TELEPHONE (OUTPATIENT)
Dept: ANTICOAGULATION | Facility: CLINIC | Age: 39
End: 2024-02-27
Payer: MEDICARE

## 2024-02-27 NOTE — TELEPHONE ENCOUNTER
Anticoagulation Management    Lisandra Arauz is being followed by the anticoagulation clinic while in noncompliant status. Lisandra Arauz is receiving every 6 week reminder calls.     Last INR checked on 9/20/23    Called and Left message for patient to call and schedule lab appointment as soon as possible. If returning call, please schedule.

## 2024-03-27 DIAGNOSIS — D68.62 LUPUS ANTICOAGULANT SYNDROME (H): ICD-10-CM

## 2024-03-27 RX ORDER — WARFARIN SODIUM 5 MG/1
TABLET ORAL
Qty: 15 TABLET | Refills: 0 | Status: SHIPPED | OUTPATIENT
Start: 2024-03-27 | End: 2024-04-17

## 2024-03-27 NOTE — TELEPHONE ENCOUNTER
[facility] unable to refill as the last INR check was on 9/20/23. [facility] has attempted to contact Lisandra multiple times and still remains non-compliant. Will forward to PCP to review.     Thanks!     Tavon Monsivais RN, BSN  [facility] Anticoagulation Team

## 2024-04-09 ENCOUNTER — TELEPHONE (OUTPATIENT)
Dept: ANTICOAGULATION | Facility: CLINIC | Age: 39
End: 2024-04-09
Payer: MEDICARE

## 2024-04-17 DIAGNOSIS — D68.62 LUPUS ANTICOAGULANT SYNDROME (H): ICD-10-CM

## 2024-04-17 RX ORDER — WARFARIN SODIUM 5 MG/1
TABLET ORAL
Qty: 15 TABLET | Refills: 0 | Status: SHIPPED | OUTPATIENT
Start: 2024-04-17 | End: 2024-05-07

## 2024-04-17 NOTE — TELEPHONE ENCOUNTER
ANTICOAGULATION MANAGEMENT:  Medication Refill    [facility] unable to refill as the last INR check was SEPTEMBER 2023. [facility] has attempted to contact pt multiple times and she still remains non compliant.     PLEASE CONTACT PATIENT TO INFORM HER OF THE NEED FOR ROUTINE INR CHECKS, UNLESS PCP WOULD LIKE TO TAKEOVER MANAGEMENT.     Thank you!     Maryellen Kothari RN  Anticoagulation Clinic

## 2024-04-19 ENCOUNTER — LAB (OUTPATIENT)
Dept: LAB | Facility: CLINIC | Age: 39
End: 2024-04-19
Payer: MEDICARE

## 2024-04-19 ENCOUNTER — ANTICOAGULATION THERAPY VISIT (OUTPATIENT)
Dept: ANTICOAGULATION | Facility: CLINIC | Age: 39
End: 2024-04-19

## 2024-04-19 DIAGNOSIS — D68.62 LUPUS ANTICOAGULANT SYNDROME (H): ICD-10-CM

## 2024-04-19 LAB — INR BLD: 5.5 (ref 0.9–1.1)

## 2024-04-19 PROCEDURE — 85610 PROTHROMBIN TIME: CPT

## 2024-04-19 PROCEDURE — 36416 COLLJ CAPILLARY BLOOD SPEC: CPT

## 2024-04-19 NOTE — PROGRESS NOTES
ANTICOAGULATION MANAGEMENT     Lisandra Arauz 38 year old female is on warfarin with supratherapeutic INR result. (Goal INR 2.0-3.0)    Recent labs: (last 7 days)     04/19/24  1053   INR 5.5*       ASSESSMENT     Warfarin Lab Questionnaire    Warfarin Doses Last 7 Days          4/19/2024   Warfarin Lab Questionnaire   Missed doses within past 14 days? No   Changes in diet or alcohol within past 14 days? No   Medication changes since last result? No   Injuries or illness since last result? No   New shortness of breath, severe headaches or sudden changes in vision since last result? No   Abnormal bleeding since last result? No   Upcoming surgery, procedure? No     Previous result: Therapeutic last visit; previously outside of goal range  Additional findings:  Pt was very overdue for her INR. Her last INR was 9/7/23. Discussed the importance of more frequent INR's Pt verbalized understanding       PLAN     Recommended plan for no diet, medication or health factor changes affecting INR     Dosing Instructions: hold 2 doses then continue your current warfarin dose with next INR in 3 days   (advised to not take her dose Monday until she speaks to [facility])    Summary  As of 4/19/2024      Full warfarin instructions:  4/20: Hold; 4/21: Hold; Otherwise 5 mg every day   Next INR check:  4/22/2024               Telephone call with Lisandra who verbalizes understanding and agrees to plan and who agrees to plan and repeated back plan correctly    Lab visit scheduled    Education provided:   Goal range and lab monitoring: goal range and significance of current result, Importance of therapeutic range, and Importance of following up at instructed interval  Symptom monitoring: monitoring for bleeding signs and symptoms, monitoring for clotting signs and symptoms, and when to seek medical attention/emergency care  Contact [phone]  with any changes, questions or concerns.     Plan made per [facility] anticoagulation protocol    Tavon HANDY  Loi RN  Anticoagulation Clinic  4/19/2024    _______________________________________________________________________     Anticoagulation Episode Summary       Current INR goal:  2.0-3.0   TTR:  --   Target end date:  Indefinite   Send INR reminders to:  [facility]    Indications    Lupus anticoagulant syndrome (H24) [D68.62]  History of pulmonary embolism [Z86.711]             Comments:  Reopened on 9/20/23. Due to non-compliance, episode was closed on 6/16/23.             Anticoagulation Care Providers       Provider Role Specialty Phone number    Flaquita Starr MD Referring Family Medicine [phone]    Li Ashraf MD Referring Family Medicine [phone]

## 2024-04-22 ENCOUNTER — ANTICOAGULATION THERAPY VISIT (OUTPATIENT)
Dept: ANTICOAGULATION | Facility: CLINIC | Age: 39
End: 2024-04-22

## 2024-04-22 NOTE — PROGRESS NOTES
ANTICOAGULATION MANAGEMENT     Lisandra Arauz 38 year old female is on warfarin with therapeutic INR result. (Goal INR 2.0-3.0)    Recent labs: (last 7 days)     04/22/24  0737   INR 2.0*       ASSESSMENT     Warfarin Lab Questionnaire    Warfarin Doses Last 7 Days          4/19/2024   Warfarin Lab Questionnaire   Missed doses within past 14 days? No   Changes in diet or alcohol within past 14 days? No   Medication changes since last result? No   Injuries or illness since last result? No   New shortness of breath, severe headaches or sudden changes in vision since last result? No   Abnormal bleeding since last result? No   Upcoming surgery, procedure? No     Previous result: Supratherapeutic  Additional findings:  Has been non compliant       PLAN     Unable to reach pt today.    LM to return call to [facility] & we will try her back again.    Follow up required to discuss dosing instructions and confirm understanding of instructions    Maryellen Kothari RN  Anticoagulation Clinic  4/22/2024

## 2024-04-23 NOTE — PROGRESS NOTES
ANTICOAGULATION MANAGEMENT     Lisandra Arauz 38 year old female is on warfarin with therapeutic INR result. (Goal INR 2.0-3.0)    Recent labs: (last 7 days)     04/22/24  0737   INR 2.0*       ASSESSMENT     Source(s): Chart Review and Patient/Caregiver Call     Warfarin doses taken: Held for supratherapeutic INR  recently which may be affecting INR held dose Monday on her own  Diet: No new diet changes identified  Medication/supplement changes: None noted  New illness, injury, or hospitalization: No  Signs or symptoms of bleeding or clotting: No  Previous result: Supratherapeutic  Additional findings:  Pt has been non compliant with INR check's, needs close follow up       PLAN     Recommended plan for temporary change(s) and ongoing change(s) affecting INR     Dosing Instructions: Continue your current warfarin dose with next INR in 1 week (advised Friday check, she is unable).     Summary  As of 4/22/2024      Full warfarin instructions:  4/22: Hold; Otherwise 5 mg every day   Next INR check:                 Telephone call with Lisandra who verbalizes understanding and agrees to plan and who agrees to plan and repeated back plan correctly    Lab visit scheduled    Education provided:   Goal range and lab monitoring: Importance of therapeutic range and Importance of following up at instructed interval  Symptom monitoring: monitoring for bleeding signs and symptoms and monitoring for clotting signs and symptoms    Plan made per [facility] anticoagulation protocol    Maryellen Kothari RN  Anticoagulation Clinic  4/23/2024    _______________________________________________________________________     Anticoagulation Episode Summary       Current INR goal:  2.0-3.0   TTR:  28.6% (2 d)   Target end date:  Indefinite   Send INR reminders to:  [facility]    Indications    Lupus anticoagulant syndrome (H24) [D68.62]  History of pulmonary embolism [Z86.711]             Comments:  Reopened on 9/20/23. Due to non-compliance,  episode was closed on 6/16/23.             Anticoagulation Care Providers       Provider Role Specialty Phone number    Flaquita Starr MD Referring Family Medicine [phone]    Li Ashraf MD Referring Family Medicine [phone]

## 2024-04-30 ENCOUNTER — TELEPHONE (OUTPATIENT)
Dept: ANTICOAGULATION | Facility: CLINIC | Age: 39
End: 2024-04-30
Payer: MEDICARE

## 2024-04-30 NOTE — TELEPHONE ENCOUNTER
ANTICOAGULATION     Lisandra Aruaz is overdue for an INR check.     Left message for patient to call and schedule lab appointment as soon as possible. If returning call, please schedule.     Bertha Wilhelm RN

## 2024-05-07 ENCOUNTER — TELEPHONE (OUTPATIENT)
Dept: ANTICOAGULATION | Facility: CLINIC | Age: 39
End: 2024-05-07
Payer: MEDICARE

## 2024-05-07 DIAGNOSIS — D68.62 LUPUS ANTICOAGULANT SYNDROME (H): ICD-10-CM

## 2024-05-07 RX ORDER — WARFARIN SODIUM 5 MG/1
TABLET ORAL
Qty: 30 TABLET | Refills: 0 | Status: SHIPPED | OUTPATIENT
Start: 2024-05-07 | End: 2024-06-10

## 2024-05-07 NOTE — LETTER
[facility] ANTICOAGULATION CLINIC  [address] [place] [address]  Phone: [phone]  Fax: [phone]   May 7, 2024        Lisandra Arauz  [address]  [place] [facility] [address]            Dear Lisandra,    You are currently under the care of [facility] for your warfarin (Coumadin , Jantoven ) therapy.  We are contacting you because our records show you were due for an INR on 5/1/2024.    There are potentially serious risks when taking warfarin without careful monitoring and we want to make sure you are safely managed.  Routine lab monitoring is required for warfarin refills.     Please call [phone]  as soon as possible to schedule a lab appointment. If it is difficult for you to get to lab, please call us to discuss options.  If there has been a change in your care or other concerns, please let us know so we can help and/or update our records.         Sincerely,       [facility] Anticoagulation Clinic

## 2024-05-07 NOTE — TELEPHONE ENCOUNTER
ANTICOAGULATION     Lisandra Arauz is overdue for an INR check.     Left message for patient to call and schedule lab appointment as soon as possible. If returning call, please schedule.     Fernanda Sam RN

## 2024-05-07 NOTE — TELEPHONE ENCOUNTER
ANTICOAGULATION MANAGEMENT:  Medication Refill    Anticoagulation Summary  As of 4/22/2024      Warfarin maintenance plan:  5 mg (5 mg x 1) every day   Next INR check:  4/29/2024   Target end date:  Indefinite    Indications    Lupus anticoagulant syndrome (H24) [D68.62]  History of pulmonary embolism [Z86.711]                 Anticoagulation Care Providers       Provider Role Specialty Phone number    Flaquita Starr MD Referring Family Medicine [phone]    Li Ashraf MD Referring Family Medicine [phone]            Refill Criteria    Visit with referring provider/group: Meets criteria: office visit within referring provider group in the last 1 year on 2/6/24    [facility] referral last signed: 09/20/2023; within last year: Yes    Lab monitoring not exceeding 2 weeks overdue: Yes    Lisandra meets all criteria for refill. Rx instructions and quantity match patient's current dosing plan.  30 day supply with 0 refills granted per [facility] protocol     Bertha Wilhelm RN  Anticoagulation Clinic

## 2024-05-15 ENCOUNTER — TELEPHONE (OUTPATIENT)
Dept: ANTICOAGULATION | Facility: CLINIC | Age: 39
End: 2024-05-15
Payer: MEDICARE

## 2024-05-15 NOTE — LETTER
[facility] ANTICOAGULATION CLINIC  [address] [facility] [address]  Phone: [phone]  Fax: [phone]   May 15, 2024        Lisandra Arauz  [address]  [place] [address]            Dear Lisandra,    You are currently under the care of [facility] Anticoagulation [facility] for your warfarin (Coumadin , Jantoven ) therapy.  We are contacting you because our records show you were due for an INR on 4/29/24.    There are potentially serious risks when taking warfarin without careful monitoring and we want to make sure you are safely managed.  Routine lab monitoring is required for warfarin refills.     Please call [phone]  as soon as possible to schedule a lab appointment. If it is difficult for you to get to lab, please call us to discuss options.  If there has been a change in your care or other concerns, please let us know so we can help and/or update our records.         Sincerely,       [facility] Anticoagulation Clinic

## 2024-06-03 ENCOUNTER — TELEPHONE (OUTPATIENT)
Dept: FAMILY MEDICINE | Facility: CLINIC | Age: 39
End: 2024-06-03
Payer: MEDICARE

## 2024-06-03 NOTE — TELEPHONE ENCOUNTER
Patient calling, says she has an appointment scheduled tomorrow for rash to her inner thighs.   Says it started a few weeks ago but has been getting really bad, hard to sleep and walk.  Says the last few days it has become more red, spreading down to knee, feels nauseated, maybe a fever.   Skin is peeling a bit.    Patient has Lupus.    She was hoping to make it to the visit she has scheduled, made the appt last week but says it is unbearable the last 2 days.    No openings today to be seen in person, concern is infection with her symptoms.   Advised ER or urgency center (may need IV antibiotic).    Patient wants to keep tomorrow's appt as scheduled in case follow up needed.   She will call to cancel if not needed.    Debbie CARRILLO RN  [facility] Triage

## 2024-06-04 ENCOUNTER — OFFICE VISIT (OUTPATIENT)
Dept: FAMILY MEDICINE | Facility: CLINIC | Age: 39
End: 2024-06-04
Payer: MEDICARE

## 2024-06-04 ENCOUNTER — ANTICOAGULATION THERAPY VISIT (OUTPATIENT)
Dept: ANTICOAGULATION | Facility: CLINIC | Age: 39
End: 2024-06-04

## 2024-06-04 VITALS
DIASTOLIC BLOOD PRESSURE: 76 MMHG | HEART RATE: 105 BPM | OXYGEN SATURATION: 100 % | RESPIRATION RATE: 16 BRPM | HEIGHT: 61 IN | TEMPERATURE: 99.3 F | SYSTOLIC BLOOD PRESSURE: 130 MMHG | WEIGHT: 172 LBS | BODY MASS INDEX: 32.47 KG/M2

## 2024-06-04 DIAGNOSIS — R21 RASH: Primary | ICD-10-CM

## 2024-06-04 DIAGNOSIS — D68.62 LUPUS ANTICOAGULANT SYNDROME (H): ICD-10-CM

## 2024-06-04 LAB — INR BLD: 5.1 (ref 0.9–1.1)

## 2024-06-04 PROCEDURE — 36416 COLLJ CAPILLARY BLOOD SPEC: CPT | Performed by: PHYSICIAN ASSISTANT

## 2024-06-04 PROCEDURE — 85610 PROTHROMBIN TIME: CPT | Performed by: PHYSICIAN ASSISTANT

## 2024-06-04 PROCEDURE — 99213 OFFICE O/P EST LOW 20 MIN: CPT | Performed by: PHYSICIAN ASSISTANT

## 2024-06-04 RX ORDER — CEPHALEXIN 500 MG/1
500 CAPSULE ORAL
COMMUNITY
Start: 2024-06-03 | End: 2024-06-13

## 2024-06-04 RX ORDER — ONDANSETRON 4 MG/1
4 TABLET, ORALLY DISINTEGRATING ORAL
COMMUNITY
Start: 2024-06-03 | End: 2024-06-08

## 2024-06-04 RX ORDER — PREDNISONE 50 MG/1
50 TABLET ORAL
COMMUNITY
Start: 2024-06-03 | End: 2024-06-10

## 2024-06-04 NOTE — PROGRESS NOTES
ANTICOAGULATION MANAGEMENT     Lisandra Arauz 39 year old female is on warfarin with supratherapeutic INR result. (Goal INR 2.0-3.0)    Recent labs: (last 7 days)     06/04/24  1612   INR 5.1*       ASSESSMENT     Source(s): Chart Review     Warfarin doses taken: Reviewed in chart  Diet:  lm  Medication/supplement changes:  Pt had IV antibiotics in the ED at [facility], is starting keflex for 10 days, and prednisone, roxicodone for pain  New illness, injury, or hospitalization: Yes: cellulitis  Signs or symptoms of bleeding or clotting: No  Previous result: Therapeutic last visit; previously outside of goal range  Additional findings:  Pt was due for INR April 29       PLAN     Recommended plan for temporary change(s) affecting INR     Dosing Instructions:Already took dose today, hold 2 doses then continue your current warfarin dose with next INR in 1 week. Advised recheck in 24-72 hours, but pt refuses, states she is going out of town and cannot check anywhere or make any sooner work.       Summary  As of 6/4/2024      Full warfarin instructions:  6/5: Hold; 6/6: Hold; Otherwise 5 mg every day   Next INR check:  6/11/2024               Telephone call with Lisandra     Lab visit scheduled    Education provided:   Goal range and lab monitoring: goal range and significance of current result and Importance of following up at instructed interval  Interaction IS anticipated between warfarin and keflex, prednisone  Symptom monitoring: monitoring for bleeding signs and symptoms and when to seek medical attention/emergency care  Contact [phone]  with any changes, questions or concerns.     Plan made per [facility] anticoagulation protocol  Result Cc'd to pcp- per [facility] protocol, provider to be informed of any INR >5      Maryellen Kothari RN  Anticoagulation Clinic  6/4/2024    _______________________________________________________________________     Anticoagulation Episode Summary       Current INR goal:  2.0-3.0   TTR:  32.0% (1.5 mo)    Target end date:  Indefinite   Send INR reminders to:  [facility] [place]    Indications    Lupus anticoagulant syndrome (H24) [D68.62]  History of pulmonary embolism [Z86.711]             Comments:  Reopened on 9/20/23. Due to non-compliance, episode was closed on 6/16/23.             Anticoagulation Care Providers       Provider Role Specialty Phone number    Flaquita Starr MD Referring Family Medicine [phone]    Li Ashraf MD Referring Family Medicine [phone]             Maryellen Kothari RN  Anticoagulation Clinic  6/4/2024

## 2024-06-04 NOTE — PROGRESS NOTES
"  Assessment & Plan     Rash  I reviewed patient's ER notes from yesterday where she was seen for this rash.  She was given Keflex and prednisone to use for possible cellulitis.  However it is still unclear what is the etiology of this rash.  As it does not seem like cellulitis on  on her arms and back and torso. Low suspicion for viral erythema, SJS/TEN, or scarlet fever rash.  She has a appointment tomorrow morning with dermatology.  I encourage patient to keep this appointment as they will hopefully get to the bottom of the etiology.  Advised to continue with the prescription sent by the ER.  Follow-up in clinic as needed.    Lupus anticoagulant syndrome (H24)  - INR point of care        MED REC REQUIRED  Post Medication Reconciliation Status: discharge medications reconciled and changed, per note/orders  BMI  Estimated body mass index is 32.27 kg/m  as calculated from the following:    Height as of this encounter: 1.555 m (5' 1.22\").    Weight as of this encounter: 78 kg (172 lb).             Jennifer Fry is a 39 year old, presenting for the following health issues:  Rash and ER F/U        6/4/2024     2:59 PM   Additional Questions   Roomed by colton Cain tomorrow at 8:15am   Arabella    Needs INR done today for her refills    History of Present Illness       Reason for visit:  Rash on inner thighs pain red inflamed  Symptom onset:  3-7 days ago    She eats 2-3 servings of fruits and vegetables daily.She consumes 1 sweetened beverage(s) daily.She exercises with enough effort to increase her heart rate 30 to 60 minutes per day.  She exercises with enough effort to increase her heart rate 4 days per week.   She is taking medications regularly.         ED/UC Followup:    Facility:  [facility]  Date of visit: 6/3/24  Reason for visit: rash   Current Status: following up with derm tomorrow  Small rash on right groin started last fall. Then over the last week the rash has gotten worse. Its spread " "further along the upper thighs and in the groin.  The rash is spread to her stomach, arms, and legs.  It is painful and feels stinging.  She has a history of lupus but has never had anything like this.  The ER did a full evaluation with lab work which was unremarkable.  If that either cellulitis or hives.  She was placed on Keflex and prednisone.  She started both medications this morning.  She has not noticed any relief.  She sees dermatology tomorrow.      Review of Systems  Constitutional, HEENT, cardiovascular, pulmonary, gi and gu systems are negative, except as otherwise noted.      Objective    /76   Pulse 105   Temp 99.3  F (37.4  C) (Oral)   Resp 16   Ht 1.555 m (5' 1.22\")   Wt 78 kg (172 lb)   LMP  (LMP Unknown)   SpO2 100%   BMI 32.27 kg/m    Body mass index is 32.27 kg/m .  Physical Exam   GENERAL: alert and no distress  SKIN: Significant erythema to bilateral inner groin and thigh, with a few satellite, on bilateral arms and lower legs back and stomach also has red erythematous papules with some dryness and crusting,           Signed Electronically by: SUSAN Boothe    "

## 2024-06-04 NOTE — PATIENT INSTRUCTIONS
Keep appointment with Dermatology tomorrow.  Continue with antibiotic and steroid as prescribed by ER

## 2024-06-08 DIAGNOSIS — D68.62 LUPUS ANTICOAGULANT SYNDROME (H): ICD-10-CM

## 2024-06-10 RX ORDER — WARFARIN SODIUM 5 MG/1
TABLET ORAL
Qty: 90 TABLET | Refills: 1 | Status: SHIPPED | OUTPATIENT
Start: 2024-06-10

## 2024-06-10 NOTE — TELEPHONE ENCOUNTER
ANTICOAGULATION MANAGEMENT:  Medication Refill    Anticoagulation Summary  As of 6/4/2024      Warfarin maintenance plan:  5 mg (5 mg x 1) every day   Next INR check:  6/11/2024   Target end date:  Indefinite    Indications    Lupus anticoagulant syndrome (H24) [D68.62]  History of pulmonary embolism [Z86.711]                 Anticoagulation Care Providers       Provider Role Specialty Phone number    Flaquita Starr MD Referring Family Medicine [phone]    Li Ashraf MD Referring Family Medicine [phone]            Refill Criteria    Visit with referring provider/group: Meets criteria: office visit within referring provider group in the last 1 year on 6/4/24    [facility] referral last signed: 09/20/2023; within last year: Yes    Lab monitoring not exceeding 2 weeks overdue: Yes    Lisandra meets all criteria for refill. Rx instructions and quantity match patient's current dosing plan. Warfarin 90 day supply with 1 refill granted per [facility] protocol     Bertha Wilhelm RN  Anticoagulation Clinic

## 2024-06-14 ENCOUNTER — ANTICOAGULATION THERAPY VISIT (OUTPATIENT)
Dept: ANTICOAGULATION | Facility: CLINIC | Age: 39
End: 2024-06-14

## 2024-06-14 ENCOUNTER — TELEPHONE (OUTPATIENT)
Dept: FAMILY MEDICINE | Facility: CLINIC | Age: 39
End: 2024-06-14

## 2024-06-14 ENCOUNTER — LAB (OUTPATIENT)
Dept: LAB | Facility: CLINIC | Age: 39
End: 2024-06-14
Payer: MEDICARE

## 2024-06-14 DIAGNOSIS — D68.62 LUPUS ANTICOAGULANT SYNDROME (H): ICD-10-CM

## 2024-06-14 LAB — INR BLD: 2.6 (ref 0.9–1.1)

## 2024-06-14 PROCEDURE — 36415 COLL VENOUS BLD VENIPUNCTURE: CPT

## 2024-06-14 PROCEDURE — 85610 PROTHROMBIN TIME: CPT

## 2024-06-14 NOTE — PROGRESS NOTES
ANTICOAGULATION MANAGEMENT     Lisandra Arauz 39 year old female is on warfarin with therapeutic INR result. (Goal INR 2.0-3.0)    Recent labs: (last 7 days)     06/14/24  1011   INR 2.6*       ASSESSMENT     Source(s): Chart Review  Previous INR was Supratherapeutic  Medication, diet, health changes since last INR chart reviewed ~ recent ER visit for cellulitis + 10 day course of keflex & prednisone         PLAN     Unable to reach Lisandra today.    Left message to continue 5 mg daily this weekend. Request call back for assessment.    Follow up required to confirm warfarin dose taken and assess for changes    Letty Owusu RN  Anticoagulation Clinic  6/14/2024

## 2024-06-14 NOTE — PROGRESS NOTES
ANTICOAGULATION MANAGEMENT     Lisandra Arauz 39 year old female is on warfarin with therapeutic INR result. (Goal INR 2.0-3.0)    Recent labs: (last 7 days)     06/14/24  1011   INR 2.6*       ASSESSMENT     Source(s): Chart Review and Patient/Caregiver Call     Warfarin doses taken: Held for 2 days  recently which may be affecting INR  Diet: No new diet changes identified  Medication/supplement changes: Prednisone 10 day course (dates: 6/3/24-6/13/24) which may be increasing INR today  Keflex 10 day course (dates: 6/3/24-6/13/24) which may be increasing INR today  Oxycodone prn finished No interaction anticipated  Zofran prn No interaction anticipated  New illness, injury, or hospitalization: Yes: ER visit 6/3/24 for cellulitis   Signs or symptoms of bleeding or clotting: No  Previous result: Supratherapeutic  Additional findings: Dermatology follow-up on 6/19/24 to discuss biopsy results of bilateral legs       PLAN     Recommended plan for temporary change(s) affecting INR     Dosing Instructions: Continue your current warfarin dose with next INR in 2-3 weeks       Summary  As of 6/14/2024      Full warfarin instructions:  5 mg every day   Next INR check:  7/5/2024               Telephone call with Lisandra who verbalizes understanding and agrees to plan    Lab visit scheduled    Education provided:   Please call back if any changes to your diet, medications or how you've been taking warfarin  Interaction IS anticipated between warfarin and Keflex & Prednisone  Symptom monitoring: monitoring for bleeding signs and symptoms and monitoring for clotting signs and symptoms    Plan made per [facility] anticoagulation protocol    Letty Owusu, RN  Anticoagulation Clinic  6/14/2024    _______________________________________________________________________     Anticoagulation Episode Summary       Current INR goal:  2.0-3.0   TTR:  29.2% (1.8 mo)   Target end date:  Indefinite   Send INR reminders to:  [facility]     Indications    Lupus anticoagulant syndrome (H24) [D68.62]  History of pulmonary embolism [Z86.711]             Comments:  Reopened on 9/20/23. Due to non-compliance, episode was closed on 6/16/23.             Anticoagulation Care Providers       Provider Role Specialty Phone number    Flaquita Starr MD Referring Family Medicine [phone]    Li Ashraf MD Referring Family Medicine [phone]

## 2024-06-14 NOTE — TELEPHONE ENCOUNTER
Writer returned call to patient and informed her of recent refill (6/10/24) - advised to contact pharmacy. KRISTINE SimonN, RN

## 2024-07-12 ENCOUNTER — TELEPHONE (OUTPATIENT)
Dept: ANTICOAGULATION | Facility: CLINIC | Age: 39
End: 2024-07-12
Payer: MEDICARE

## 2024-07-12 NOTE — LETTER
[facility] ANTICOAGULATION CLINIC  [address] [facility] [address]  Phone: [phone]  Fax: [phone]   July 12, 2024        Lisandra Arauz  [address]  [place] [address]            Dear Lisandra,    You are currently under the care of [facility] Anticoagulation [facility] for your warfarin (Coumadin , Jantoven ) therapy.  We are contacting you because our records show you were due for an INR on 7/5/2024.    There are potentially serious risks when taking warfarin without careful monitoring and we want to make sure you are safely managed.  Routine lab monitoring is required for warfarin refills.     Please call [phone] as soon as possible to schedule a lab appointment. If it is difficult for you to get to lab, please call us to discuss options.  If there has been a change in your care or other concerns, please let us know so we can help and/or update our records.         Sincerely,       [facility] Anticoagulation Clinic

## 2024-07-12 NOTE — LETTER
[facility] ANTICOAGULATION CLINIC  [address] [facility] [address]  Phone: [phone]  Fax: [phone]   July 12, 2024        Lisandra Arauz  [address]  [place] [address]            Dear Lisandra,    You are currently under the care of [facility] Anticoagulation [facility] for your warfarin (Coumadin , Jantoven ) therapy.  We are contacting you because our records show you were due for an INR on 7/5/2024.    There are potentially serious risks when taking warfarin without careful monitoring and we want to make sure you are safely managed.  Routine lab monitoring is required for warfarin refills.     Please call [phone] as soon as possible to schedule a lab appointment. If it is difficult for you to get to lab, please call us to discuss options.  If there has been a change in your care or other concerns, please let us know so we can help and/or update our records.         Sincerely,       [facility] Anticoagulation Clinic

## 2024-07-19 ENCOUNTER — TELEPHONE (OUTPATIENT)
Dept: ANTICOAGULATION | Facility: CLINIC | Age: 39
End: 2024-07-19
Payer: MEDICARE

## 2024-07-19 NOTE — TELEPHONE ENCOUNTER
ANTICOAGULATION     Lisandra Arauz is overdue for an INR check.     Left message for patient to call and schedule lab appointment as soon as possible. If returning call, please schedule.     Domi Cat RN

## 2024-07-26 ENCOUNTER — DOCUMENTATION ONLY (OUTPATIENT)
Dept: ANTICOAGULATION | Facility: CLINIC | Age: 39
End: 2024-07-26
Payer: MEDICARE

## 2024-07-26 NOTE — LETTER
[facility] ANTICOAGULATION CLINIC  [address] [facility] [address]  Phone: [phone]  Fax: [phone]     July 26, 2024        Lisandra Arauz  [address]  [place] [address]            Dear Lisandra,    You are currently under the care of [facility] Anticoagulation [facility] for your warfarin (Coumadin , Jantoven ) therapy.  We are contacting you because our records show you were due for an INR on 7/5/24.    There are potentially serious risks when taking warfarin without careful monitoring and we want to make sure you are safely managed.  Routine lab monitoring is required for warfarin refills.     Please call [phone] as soon as possible to schedule a lab appointment. If it is difficult for you to get to lab, please call us to discuss options.  If there has been a change in your care or other concerns, please let us know so we can help and/or update our records.         Sincerely,       [facility] Anticoagulation Clinic

## 2024-07-26 NOTE — PROGRESS NOTES
ANTICOAGULATION     Lisandra Arauz is overdue for an INR check.     Reminder letter sent    Maral Duffy RN, BSN, PHN  Anticoagulation Clinic   [phone]

## 2024-08-09 ENCOUNTER — DOCUMENTATION ONLY (OUTPATIENT)
Dept: ANTICOAGULATION | Facility: CLINIC | Age: 39
End: 2024-08-09
Payer: MEDICARE

## 2024-08-09 NOTE — LETTER
[facility] ANTICOAGULATION CLINIC  [address] [facility] [address]  Phone: [phone]  Fax: [phone]   August 9, 2024        Lisandra Arauz  [address]  [facility] [address]            Dear Lisandra,    You are currently under the care of [facility] for your warfarin (Coumadin , Jantoven ) therapy.  We are contacting you because our records show you were due for an INR on 7/5/24.    There are potentially serious risks when taking warfarin without careful monitoring and we want to make sure you are safely managed.  Routine lab monitoring is required for warfarin refills.     Please call [phone] as soon as possible to schedule a lab appointment. If it is difficult for you to get to lab, please call us to discuss options.  If there has been a change in your care or other concerns, please let us know so we can help and/or update our records.         Sincerely,       [facility]

## 2024-08-09 NOTE — PROGRESS NOTES
Anticoagulation Clinic Notification    Lisandra is past due for an INR.   Their last result was 2.6 on 6/14/24 and was due to come back on 7/5/24.    She received phone calls and letters over the last several weeks in attempt to arrange follow up labs. Lisandra Arauz will be contacted again today.     Please contact patient directly to discuss compliance with monitoring or schedule visit to review ongoing anticoagulation therapy.    Thank you,     [facility]ticoagulation [facility]

## 2024-09-06 ENCOUNTER — TELEPHONE (OUTPATIENT)
Dept: ANTICOAGULATION | Facility: CLINIC | Age: 39
End: 2024-09-06
Payer: MEDICARE

## 2024-09-06 NOTE — TELEPHONE ENCOUNTER
ANTICOAGULATION MANAGEMENT PROGRAM    Dr. Ashraf,     Our records indicate that Lisandra Arauz remains past due to check an INR. Lisandra Arauz was contacted multiple times over at least the last 8 weeks to attempt to arrange a follow up appointment.    Lisandra Arauz last had an an INR checked on 6/14/24 and was due for follow up on 7/5/24     Called patient,Reminder letter sent     At this time Lisandra Arauz will be moved to noncompliant status within the program until their referral expires on 9/20/24. While in noncompliant status the patient would continue to be contacted every 6 weeks by the anticoagulation program to attempt to schedule patient. You will be notified of each contact attempt to make you aware of patient's ongoing noncompliance.        Thank you,     [facility] Anticoagulation Management Program

## 2024-09-06 NOTE — LETTER
[facility] ANTICOAGULATION CLINIC  [address] [facility] [address]  Phone: [phone]  Fax: [phone]     September 6, 2024        Lisandra Arauz  [address]  [facility] [address]            Dear Lisandra,    You are currently under the care of [facility] for your warfarin (Coumadin , Jantoven ) therapy.  We are contacting you because our records show you were due for an INR on 7/5/24.    There are potentially serious risks when taking warfarin without careful monitoring and we want to make sure you are safely managed.  Routine lab monitoring is required for warfarin refills.     Please call [phone] as soon as possible to schedule a lab appointment. If it is difficult for you to get to lab, please call us to discuss options.  If there has been a change in your care or other concerns, please let us know so we can help and/or update our records.         Sincerely,       [facility] Anticoagulation Clinic

## 2024-10-14 ENCOUNTER — DOCUMENTATION ONLY (OUTPATIENT)
Dept: ANTICOAGULATION | Facility: CLINIC | Age: 39
End: 2024-10-14

## 2024-10-14 NOTE — PROGRESS NOTES
ANTICOAGULATION  MANAGEMENT    Lisandra Arauz is being discharged from the [facility] Anticoagulation Management Program (ACC).    Reason for discharge: non-compliance with Anticoagulation Management Program despite outreach.  Last  an INR was done on 6/14/24. Letter mailed to patient advising them to schedule visit with referring provider.       Anticoagulation episode resolved, ACC referral closed, and Standing order discontinued    Patient may be accepted back in ACC program after an office visit to discuss non-compliance and check an INR. Patient must be agreeable to follow the monitoring recommendations of the program and will need a new referral to be re-enrolled.    Marla Duffy, RN, BSN, PHN  Anticoagulation Clinic   [phone]

## 2024-10-14 NOTE — LETTER
[facility] ANTICOAGULATION [facility]  [address] [facility] [address]  Phone: [phone]  Fax: [phone]       October 14, 2024        Lisandra Arauz  [address]  [facility] [address]            Dear Lisandra,    You have been under the care of the [facility] Anticoagulation Management Program for monitoring of your warfarin (Coumadin , Jantoven )  for your Lupus anticoagulant syndrome .  Our records indicate that you are either past due to have your blood work checked or have not followed clinic staff recommendations. Your last INR was on 6/14/24.     We regret to inform you that since you have not responded to our phone calls and letters the [facility] Anticoagulation Management Program will no longer be able to manage your warfarin therapy. To ensure safe use of your warfarin and to receive additional warfarin refills you will need to make an office visit with Dr. Ashraf office as soon as possible to discuss your warfarin therapy. Please call [phone] or [phone] to schedule an appointment.       Sincerely,       [facility] Anticoagulation Management Program

## 2024-12-18 DIAGNOSIS — D68.62 LUPUS ANTICOAGULANT SYNDROME (H): ICD-10-CM

## 2024-12-18 NOTE — TELEPHONE ENCOUNTER
ANTICOAGULATION MANAGEMENT:  Medication Refill    Patient has been discharged from the [facility] due to noncompliance, last INR done on 6/14/24.  ACC is no longer following.         Refill Criteria    Visit with referring provider/group: Meets criteria: visit within referring provider group in the last 15 months on 6/4/24    ACC referral last signed: 01/04/2022; within last year:  No    Lab monitoring not exceeding 2 weeks overdue: No    Lisandra does NOT meet all criteria for refill: ACC referral needs updating and > 2 weeks overdue for lab monitoring .       Bertha Wilhelm, RN  Anticoagulation Clinic

## 2024-12-23 RX ORDER — WARFARIN SODIUM 5 MG/1
TABLET ORAL
Qty: 90 TABLET | Refills: 1 | Status: SHIPPED | OUTPATIENT
Start: 2024-12-23

## 2025-01-30 ENCOUNTER — TELEPHONE (OUTPATIENT)
Dept: DERMATOLOGY | Facility: CLINIC | Age: 40
End: 2025-01-30

## 2025-01-30 NOTE — TELEPHONE ENCOUNTER
Called and spoke with pt and scheduled with Dr. Delfino Wilcox at . Pt is getting records transferred today.    Lita MUNSON RN  Dermatology   [phone]

## 2025-01-30 NOTE — TELEPHONE ENCOUNTER
Patient returned call: I asked her to contact [facility] and request records be faxed to us- I gave the [employer] release of information fax (from the website) Fax: [phone] and gave our direct fax at [place] [phone]. Patient expressed understanding.   Patient is also willing to be seen at  Derm. I am routing to them as well.     Jeni HSIEH RN BSN  [facility] Dermatology  [phone]

## 2025-01-30 NOTE — TELEPHONE ENCOUNTER
Patient was scheduled today for an appointment with SOFIA Mccann CNP following an ER visit. After rooming the patient Skye determined this patient is to be seen with an MD, this is a dermatology to dermatology referral from [facility].      Patient has a painful rash on her inner thighs which started June 2024. She has been seen at [facility] D[facility]at[facility] and the ER. She has had multiple biopsies. The rash went away for a while but returned a week or so ago, and she states it is affecting her daily activities. She is not currently using prescription topical treatments because it burns and she also tried phototherapy once which caused burning as well.     She is currently on prednisone, cefadroxil, and pain medications. Also uses Vaseline and takes benadryl. Was most recently in the ER yesterday 1/29/25 and on Saturday (please see ER note). Has had multiple biopsies.     Routing to [facility] and [facility], can you please help getting this patient scheduled with the appropriate derm MD? Please reach out to her. Thank you     I left her a voicemail to discuss records request from [facility] & to call us back to discuss at [phone]. Can she reach out to [facility] and request records be faxed to [facility]?     Thank you.  Jeni HSIEH RN BSN  [facility] Dermatology  [phone]

## 2025-02-03 ENCOUNTER — OFFICE VISIT (OUTPATIENT)
Dept: DERMATOLOGY | Facility: CLINIC | Age: 40
End: 2025-02-03
Payer: MEDICARE

## 2025-02-03 DIAGNOSIS — R21 RASH AND NONSPECIFIC SKIN ERUPTION: Primary | ICD-10-CM

## 2025-02-03 LAB — CK SERPL-CCNC: 63 U/L (ref 26–192)

## 2025-02-03 PROCEDURE — 36415 COLL VENOUS BLD VENIPUNCTURE: CPT | Performed by: STUDENT IN AN ORGANIZED HEALTH CARE EDUCATION/TRAINING PROGRAM

## 2025-02-03 PROCEDURE — 82085 ASSAY OF ALDOLASE: CPT | Mod: 90 | Performed by: STUDENT IN AN ORGANIZED HEALTH CARE EDUCATION/TRAINING PROGRAM

## 2025-02-03 PROCEDURE — 99204 OFFICE O/P NEW MOD 45 MIN: CPT | Performed by: STUDENT IN AN ORGANIZED HEALTH CARE EDUCATION/TRAINING PROGRAM

## 2025-02-03 PROCEDURE — 99000 SPECIMEN HANDLING OFFICE-LAB: CPT | Performed by: STUDENT IN AN ORGANIZED HEALTH CARE EDUCATION/TRAINING PROGRAM

## 2025-02-03 PROCEDURE — 83516 IMMUNOASSAY NONANTIBODY: CPT | Mod: 90 | Performed by: STUDENT IN AN ORGANIZED HEALTH CARE EDUCATION/TRAINING PROGRAM

## 2025-02-03 PROCEDURE — 84182 PROTEIN WESTERN BLOT TEST: CPT | Mod: 90 | Performed by: STUDENT IN AN ORGANIZED HEALTH CARE EDUCATION/TRAINING PROGRAM

## 2025-02-03 PROCEDURE — 82550 ASSAY OF CK (CPK): CPT | Performed by: STUDENT IN AN ORGANIZED HEALTH CARE EDUCATION/TRAINING PROGRAM

## 2025-02-03 PROCEDURE — 86039 ANTINUCLEAR ANTIBODIES (ANA): CPT | Mod: 90 | Performed by: STUDENT IN AN ORGANIZED HEALTH CARE EDUCATION/TRAINING PROGRAM

## 2025-02-03 PROCEDURE — 86235 NUCLEAR ANTIGEN ANTIBODY: CPT | Mod: 90 | Performed by: STUDENT IN AN ORGANIZED HEALTH CARE EDUCATION/TRAINING PROGRAM

## 2025-02-03 RX ORDER — TRIAMCINOLONE ACETONIDE 1 MG/G
OINTMENT TOPICAL 2 TIMES DAILY
Qty: 454 G | Refills: 5 | Status: SHIPPED | OUTPATIENT
Start: 2025-02-03

## 2025-02-03 RX ORDER — PREDNISONE 10 MG/1
TABLET ORAL
Qty: 70 TABLET | Refills: 0 | Status: SHIPPED | OUTPATIENT
Start: 2025-02-03 | End: 2025-03-03

## 2025-02-03 ASSESSMENT — PATIENT HEALTH QUESTIONNAIRE - PHQ9: SUM OF ALL RESPONSES TO PHQ QUESTIONS 1-9: 11

## 2025-02-03 ASSESSMENT — PAIN SCALES - GENERAL: PAINLEVEL_OUTOF10: MODERATE PAIN (6)

## 2025-02-03 NOTE — PROGRESS NOTES
[facility] Dermatology Note  Encounter Date: Feb 3, 2025  Office Visit     Reviewed patients past medical history and pertinent chart review prior to patients visit today.     Dermatology Problem List:  #Rash      Pertinent Medical History:  N/A    ____________________________________________    Assessment & Plan:     #Rash (undiagnosed uncertain prognosis)  - clinically rash looks c/w eczematous process (possibly ICD given elicitation by cold weather and most severe where thighs rub together  - this does not fit w/ lichenoid path though  - some of the lesions on the trunk could be c/w PLEVA/PLC which would also fit w/ interface on path but also not a great fit.   - Viral rash?  - slide request to see if our esteemed pathologists agree w/ CTD spectrum  - rx pred taper 40mg decrease by 10mg qweek given her rapid response to it in the past  - start TMC ointment to bothersome rash   - KATIE IFA negative 12/30  - check myositis panel, expect to be negative                                Follow-up: pending path read    All risks, benefits and alternatives were discussed with patient.  Patient is in agreement and understands the assessment and plan.  All questions were answered.    STAFF  Stewart De Leon PA-C  [facility] Dermatology    _______________________________________    CC: Rash (Rash all over body - has a lot of pain from these rashes - feels like knives are ripping through her skin - U/V light treatments made symptoms worse - has been to ED /is on ANDRZEJ from work due to symptoms/This episode since ~ 01/13/25 )    HPI:  Ms. Lisandra Arauz is a(n) 39 year old female who presents as a as a new patient.    Previously saw [facility] dermatology and Ed.     1) Rash  Onset in June 2024 on the upper thigh. Received 5 days of pred which resolved the discomfort. There was lingering color change until 01/13 when she was exposed to freezing temperatures for 30 mins at a time every few hours at her job at  [employer]   [place] and painful  Worsened by warm water, very painful for her to walk around  Tried phototherapy (~10 sec) last week but that caused a lot of pain, felt lie she was on fire  Using vaseline, it's the only thing that doesn't burn    2) Needs suture removed on arm and L thigh      Patient is otherwise feeling well, without additional skin concerns.      Physical Exam:  SKIN: Full skin, which includes the head/face, both arms, chest, back, abdomen,both legs, genitalia and/or groin buttocks, digits and/or nails, was examined.  - thin eczematous patches scattered on arms and legs  - bright patches of erythema studded w/ spongiotic papules and thin areas of erosion  No other lesions of concern on areas examined.     Medications:  Current Outpatient Medications   Medication Sig Dispense Refill    acetaminophen (TYLENOL) 500 MG tablet Take 500 mg by mouth      diphenhydrAMINE (BENADRYL) 25 MG capsule Take 25 mg by mouth every 6 hours as needed for itching or allergies. Over the counter per patient.      PARoxetine (PAXIL) 30 MG tablet Take 1 tablet (30 mg) by mouth every morning 90 tablet 3    predniSONE (DELTASONE) 10 MG tablet Take 4 tablets (40 mg) by mouth daily for 7 days, THEN 3 tablets (30 mg) daily for 7 days, THEN 2 tablets (20 mg) daily for 7 days, THEN 1 tablet (10 mg) daily for 7 days. 70 tablet 0    triamcinolone (KENALOG) 0.1 % external ointment Apply topically 2 times daily. 454 g 5    warfarin ANTICOAGULANT (COUMADIN) 5 MG tablet TAKE 1 TABLET BY MOUTH ON ALL OTHER DAYS OR AS DIRECTED BY ANTICOAGULATION 90 tablet 1    cefadroxil (DURICEF) 500 MG capsule take 1 capsule by mouth twice daily for 7 days (Patient not taking: Reported on 2/3/2025)      oxyCODONE (ROXICODONE) 5 MG tablet  (Patient not taking: Reported on 2/3/2025)      predniSONE (DELTASONE) 20 MG tablet  (Patient not taking: Reported on 2/3/2025)      triamcinolone (KENALOG) 0.1 % external cream Apply bid for 5- 7 days, then bid as needed  (Patient not taking: Reported on 2/3/2025) 80 g 3     No current facility-administered medications for this visit.      Past Medical History:   Patient Active Problem List   Diagnosis    Lupus anticoagulant syndrome    Intractable chronic migraine without aura    Gastroesophageal reflux disease    Chronic pain    Dysfunctional uterine bleeding    Generalized anxiety disorder    Obesity (BMI 30.0-34.9)    Restless legs syndrome    Cervical intraepithelial neoplasia grade III with severe dysplasia    Tobacco use disorder    Pap smear for cervical cancer screening    PTSD (post-traumatic stress disorder)    History of pulmonary embolism    Sepsis (H)    Acute pyelonephritis    Personal history of venous thrombosis and embolism    Long term (current) use of anticoagulants    Right tubal pregnancy without intrauterine pregnancy    Nephrolithiasis     Past Medical History:   Diagnosis Date    Anemia     Anxiety     Blood clot of vein in shoulder area 01/01/2014    Chronic pain     Chronic tension headaches     JEFF III with severe dysplasia     2009, had leep with normal pap smears since    Clotting disorder     Generalized anxiety disorder     Hemorrhagic ovarian cyst 08/01/2011    Required 5 blood transfusions per patient    Heterozygous for MTHFR gene mutation     Heterozygous for MTHFR gene mutation     Kidney infection     Kidney stone     Kidney stones     Ovarian cyst     Pre-eclampsia     PTSD (post-traumatic stress disorder)     Pulmonary embolism (H)     2010 and 2013 at [facility]. Diagnosed with antiphospholipid syndrome    Pulmonary embolus (H) 07/27/2010    Now on lifelong warfarin    Pyelonephritis     Tobacco use disorder        CC Referred Self, MD  No address on file on close of this encounter.

## 2025-02-03 NOTE — PROGRESS NOTES
Lisandra Arauz comes into clinic today at the request of suture removal Ordering Provider for Suture Removal:  Incision was dry, clean and intact, incision cleansed with wound cleanser and sutures were removed. Pt tolerated the procedure.       This service provided today was under the supervising provider of the day Dr. Wilcox, who was available if needed.    Pablo Anderson LPN

## 2025-02-03 NOTE — PATIENT INSTRUCTIONS
Today:  Get labs drawn downstairs. We will message you via [employer] when all results are available.   We are requesting the slides from your biopsies for our pathologists to re-check. This will help determine the most correct diagnosis. This process may take a few weeks to get the slides from [facility] and into the hands of our pathologists.    For comfort:  Start triamcinolone ointment (steroid) to affected areas twice daily  Start oral prednisone taper: 40 mg daily x 1 week, then 30 mg daily x 1 week, then 20 mg daily x 1 week, then 10 mg daily x 1 week    We will contact you via [employer] once our team's analysis of the slides is complete. Additionally Dr. Wilcox will let you know about follow-up at that time.

## 2025-02-03 NOTE — PROGRESS NOTES
Depression Screening Follow-up        2/3/2025     2:07 PM   PHQ   PHQ-9 Total Score 11   Q9: Thoughts of better off dead/self-harm past 2 weeks Not at all         2/3/2025     2:07 PM   Last PHQ-9   1.  Little interest or pleasure in doing things 2   2.  Feeling down, depressed, or hopeless 2   3.  Trouble falling or staying asleep, or sleeping too much 2   4.  Feeling tired or having little energy 2   5.  Poor appetite or overeating 2   6.  Feeling bad about yourself 0   7.  Trouble concentrating 0   8.  Moving slowly or restless 1   Q9: Thoughts of better off dead/self-harm past 2 weeks 0   PHQ-9 Total Score 11   Difficulty at work, home, or with people Very difficult         Does the patient currently have a mental health provider?  No  Follow Up Actions Taken  Mental Health Referral pended/placed     Lita Villavicencio, RN

## 2025-02-03 NOTE — PROGRESS NOTES
Depression Response    Patient completed the PHQ-9 assessment for depression and scored >9? Yes  Question 9 on the PHQ-9 was positive for suicidality? No  Does patient have current mental health provider? No    Is this a virtual visit? No    I personally notified the following: visit provider and clinic nurse

## 2025-02-03 NOTE — LETTER
2/3/2025      Lisandra Arauz  [address]  [facility] [address]      Dear Colleague,    Thank you for referring your patient, Lisandra Arauz, to the [facility]. Please see a copy of my visit note below.    [facility] Dermatology Note  Encounter Date: Feb 3, 2025  Office Visit     Reviewed patients past medical history and pertinent chart review prior to patients visit today.     Dermatology Problem List:  #Rash      Pertinent Medical History:  N/A    ____________________________________________    Assessment & Plan:     #Rash (undiagnosed uncertain prognosis)  - clinically rash looks c/w eczematous process (possibly ICD given elicitation by cold weather and most severe where thighs rub together  - this does not fit w/ lichenoid path though  - some of the lesions on the trunk could be c/w PLEVA/PLC which would also fit w/ interface on path but also not a great fit.   - Viral rash?  - slide request to see if our esteemed pathologists agree w/ CTD spectrum  - rx pred taper 40mg decrease by 10mg qweek given her rapid response to it in the past  - start TMC ointment to bothersome rash   - KATIE IFA negative 12/30  - check myositis panel, expect to be negative                                Follow-up: pending path read    All risks, benefits and alternatives were discussed with patient.  Patient is in agreement and understands the assessment and plan.  All questions were answered.    STAFF  Stewart De Leon PA-C  [facility]    _______________________________________    CC: Rash (Rash all over body - has a lot of pain from these rashes - feels like knives are ripping through her skin - U/V light treatments made symptoms worse - has been to ED /is on ANDRZEJ from work due to symptoms/This episode since ~ 01/13/25 )    HPI:  Ms. Lisandra Arauz is a(n) 39 year old female who presents as a as a new patient.    Previously saw [facility] dermatology and Ed.     1)  Rash  Onset in June 2024 on the upper thigh. Received 5 days of pred which resolved the discomfort. There was lingering color change until 01/13 when she was exposed to freezing temperatures for 30 mins at a time every few hours at her job at [employer].   Burns and painful  Worsened by warm water, very painful for her to walk around  Tried phototherapy (~10 sec) last week but that caused a lot of pain, felt lie she was on fire  Using vaseline, it's the only thing that doesn't burn    2) Needs suture removed on arm and L thigh      Patient is otherwise feeling well, without additional skin concerns.      Physical Exam:  SKIN: Full skin, which includes the head/face, both arms, chest, back, abdomen,both legs, genitalia and/or groin buttocks, digits and/or nails, was examined.  - thin eczematous patches scattered on arms and legs  - bright patches of erythema studded w/ spongiotic papules and thin areas of erosion  No other lesions of concern on areas examined.     Medications:  Current Outpatient Medications   Medication Sig Dispense Refill     acetaminophen (TYLENOL) 500 MG tablet Take 500 mg by mouth       diphenhydrAMINE (BENADRYL) 25 MG capsule Take 25 mg by mouth every 6 hours as needed for itching or allergies. Over the counter per patient.       PARoxetine (PAXIL) 30 MG tablet Take 1 tablet (30 mg) by mouth every morning 90 tablet 3     predniSONE (DELTASONE) 10 MG tablet Take 4 tablets (40 mg) by mouth daily for 7 days, THEN 3 tablets (30 mg) daily for 7 days, THEN 2 tablets (20 mg) daily for 7 days, THEN 1 tablet (10 mg) daily for 7 days. 70 tablet 0     triamcinolone (KENALOG) 0.1 % external ointment Apply topically 2 times daily. 454 g 5     warfarin ANTICOAGULANT (COUMADIN) 5 MG tablet TAKE 1 TABLET BY MOUTH ON ALL OTHER DAYS OR AS DIRECTED BY ANTICOAGULATION 90 tablet 1     cefadroxil (DURICEF) 500 MG capsule take 1 capsule by mouth twice daily for 7 days (Patient not taking: Reported on 2/3/2025)        oxyCODONE (ROXICODONE) 5 MG tablet  (Patient not taking: Reported on 2/3/2025)       predniSONE (DELTASONE) 20 MG tablet  (Patient not taking: Reported on 2/3/2025)       triamcinolone (KENALOG) 0.1 % external cream Apply bid for 5- 7 days, then bid as needed (Patient not taking: Reported on 2/3/2025) 80 g 3     No current facility-administered medications for this visit.      Past Medical History:   Patient Active Problem List   Diagnosis     Lupus anticoagulant syndrome     Intractable chronic migraine without aura     Gastroesophageal reflux disease     Chronic pain     Dysfunctional uterine bleeding     Generalized anxiety disorder     Obesity (BMI 30.0-34.9)     Restless legs syndrome     Cervical intraepithelial neoplasia grade III with severe dysplasia     Tobacco use disorder     Pap smear for cervical cancer screening     PTSD (post-traumatic stress disorder)     History of pulmonary embolism     Sepsis (H)     Acute pyelonephritis     Personal history of venous thrombosis and embolism     Long term (current) use of anticoagulants     Right tubal pregnancy without intrauterine pregnancy     Nephrolithiasis     Past Medical History:   Diagnosis Date     Anemia      Anxiety      Blood clot of vein in shoulder area 01/01/2014     Chronic pain      Chronic tension headaches      JEFF III with severe dysplasia     2009, had leep with normal pap smears since     Clotting disorder      Generalized anxiety disorder      Hemorrhagic ovarian cyst 08/01/2011    Required 5 blood transfusions per patient     Heterozygous for MTHFR gene mutation      Heterozygous for MTHFR gene mutation      Kidney infection      Kidney stone      Kidney stones      Ovarian cyst      Pre-eclampsia      PTSD (post-traumatic stress disorder)      Pulmonary embolism (H)     2010 and 2013 at [facility]. Diagnosed with antiphospholipid syndrome     Pulmonary embolus (H) 07/27/2010    Now on lifelong warfarin     Pyelonephritis      Tobacco use  disorder        CC Referred Self, MD  No address on file on close of this encounter.     Depression Screening Follow-up        2/3/2025     2:07 PM   PHQ   PHQ-9 Total Score 11   Q9: Thoughts of better off dead/self-harm past 2 weeks Not at all         2/3/2025     2:07 PM   Last PHQ-9   1.  Little interest or pleasure in doing things 2   2.  Feeling down, depressed, or hopeless 2   3.  Trouble falling or staying asleep, or sleeping too much 2   4.  Feeling tired or having little energy 2   5.  Poor appetite or overeating 2   6.  Feeling bad about yourself 0   7.  Trouble concentrating 0   8.  Moving slowly or restless 1   Q9: Thoughts of better off dead/self-harm past 2 weeks 0   PHQ-9 Total Score 11   Difficulty at work, home, or with people Very difficult         Does the patient currently have a mental health provider?  No  Follow Up Actions Taken  Mental Health Referral pended/placed     Lita Villavicencio RN      Depression Response    Patient completed the PHQ-9 assessment for depression and scored >9? Yes  Question 9 on the PHQ-9 was positive for suicidality? No  Does patient have current mental health provider? No    Is this a virtual visit? No    I personally notified the following: visit provider and clinic nurse             Lisandra Arauz comes into clinic today at the request of suture removal Ordering Provider for Suture Removal:  Incision was dry, clean and intact, incision cleansed with wound cleanser and sutures were removed. Pt tolerated the procedure.       This service provided today was under the supervising provider of the day Dr. Wilcox, who was available if needed.    Pablo Anderson LPN       Again, thank you for allowing me to participate in the care of your patient.        Sincerely,        Delfino Wilcox MD    Electronically signed

## 2025-02-04 ENCOUNTER — MYC MEDICAL ADVICE (OUTPATIENT)
Dept: DERMATOLOGY | Facility: CLINIC | Age: 40
End: 2025-02-04
Payer: MEDICARE

## 2025-02-04 DIAGNOSIS — R21 RASH: Primary | ICD-10-CM

## 2025-02-04 RX ORDER — TRIAMCINOLONE ACETONIDE 1 MG/G
OINTMENT TOPICAL 2 TIMES DAILY
Qty: 454 G | Refills: 3 | Status: SHIPPED | OUTPATIENT
Start: 2025-02-04

## 2025-02-04 NOTE — LETTER
February 12, 2025      Lisandra Arauz  [address] [address]        To Whom It May Concern:    Lisandra Arauz was seen in our clinic. She may return to work without restrictions.      Sincerely,        Delfino Wilcox MD    Electronically signed

## 2025-02-04 NOTE — LETTER
February 12, 2025      Lisandra Arauz  [address] [facility] [address]        To Whom It May Concern:    Lisandra Arauz was seen in our clinic. She may return to work without restrictions on 2/14/25.      Sincerely,        Delfino Wilcox MD    Electronically signed

## 2025-02-05 LAB — ALDOLASE SERPL-CCNC: 5 U/L

## 2025-02-09 ENCOUNTER — HEALTH MAINTENANCE LETTER (OUTPATIENT)
Age: 40
End: 2025-02-09

## 2025-02-10 LAB
ANA PAT SER IF-IMP: NORMAL
ANA SER QL: NEGATIVE
ANNOTATION COMMENT IMP: NORMAL
EJ AB SER QL: NEGATIVE
ENA JO1 IGG SER-ACNC: 1 AU/ML
HA AB SER QL LINE BLOT: NEGATIVE
KS AB SER QL LINE BLOT: NEGATIVE
MDA5 AB SER QL LINE BLOT: NEGATIVE
MI2 AB SER QL: NEGATIVE
MJ AB SER QL LINE BLOT: NEGATIVE
NUCLEAR IGG SER QL IF: NORMAL
OJ AB SER QL: NEGATIVE
PL12 AB SER QL: NEGATIVE
PL7 AB SER QL: NEGATIVE
SAE1 AB SER QL LINE BLOT: NEGATIVE
SRP AB SERPL QL: NEGATIVE
TIF1-GAMMA AB SER QL LINE BLOT: NEGATIVE
ZO AB SER QL LINE BLOT: NEGATIVE

## 2025-02-11 ENCOUNTER — LAB REQUISITION (OUTPATIENT)
Dept: LAB | Facility: CLINIC | Age: 40
End: 2025-02-11
Payer: MEDICARE

## 2025-02-11 PROCEDURE — 88321 CONSLTJ&REPRT SLD PREP ELSWR: CPT | Performed by: PATHOLOGY

## 2025-02-12 LAB
PATH REPORT.COMMENTS IMP SPEC: NORMAL
PATH REPORT.FINAL DX SPEC: NORMAL
PATH REPORT.GROSS SPEC: NORMAL
PATH REPORT.MICROSCOPIC SPEC OTHER STN: NORMAL
PATH REPORT.RELEVANT HX SPEC: NORMAL
PATH REPORT.RELEVANT HX SPEC: NORMAL
PATH REPORT.SITE OF ORIGIN SPEC: NORMAL

## 2025-02-26 DIAGNOSIS — F43.22 ADJUSTMENT DISORDER WITH ANXIOUS MOOD: ICD-10-CM

## 2025-02-26 RX ORDER — PAROXETINE 30 MG/1
TABLET, FILM COATED ORAL
Qty: 90 TABLET | Refills: 3 | OUTPATIENT
Start: 2025-02-26

## 2025-02-27 ENCOUNTER — VIRTUAL VISIT (OUTPATIENT)
Dept: FAMILY MEDICINE | Facility: CLINIC | Age: 40
End: 2025-02-27
Payer: MEDICARE

## 2025-02-27 DIAGNOSIS — D68.62 LUPUS ANTICOAGULANT SYNDROME: ICD-10-CM

## 2025-02-27 DIAGNOSIS — Z12.31 VISIT FOR SCREENING MAMMOGRAM: Primary | ICD-10-CM

## 2025-02-27 DIAGNOSIS — F43.22 ADJUSTMENT DISORDER WITH ANXIOUS MOOD: ICD-10-CM

## 2025-02-27 RX ORDER — PAROXETINE 30 MG/1
30 TABLET, FILM COATED ORAL EVERY MORNING
Qty: 30 TABLET | Refills: 2 | Status: SHIPPED | OUTPATIENT
Start: 2025-02-27

## 2025-02-27 RX ORDER — WARFARIN SODIUM 5 MG/1
TABLET ORAL
Qty: 14 TABLET | Refills: 0 | Status: SHIPPED | OUTPATIENT
Start: 2025-02-27

## 2025-02-27 NOTE — PROGRESS NOTES
Lisandra is a 39 year old who is being evaluated via a billable video visit.    How would you like to obtain your AVS? MyChart  If the video visit is dropped, the invitation should be resent by: Text to cell phone: [phone]  Will anyone else be joining your video visit? No      Assessment & Plan   Assessment & Plan  Lupus anticoagulant syndrome  Patient is requesting bridge refill on her previously prescribed warfarin, which she takes for lupus anticoagulant syndrome. We reviewed her chart and the fact that she has not had an INR done in 8 months. She can't identify any specific barrier other than that her dose has been so stable. Discussed the need to verify dose is therapeutic in order to ethically continue prescribing, as well as the fact that she will need a new referral to the anticoagulation clinic from her primary care provider and she expressed understanding of this. If covered by insurance, she may be a good candidate for at home INR monitoring. Because she only has two pills left, I will plan on sending in two week supply and she reports she will schedule INR tomorrow as well as a follow up with her PCP as soon as possible.  Orders:    warfarin ANTICOAGULANT (COUMADIN) 5 MG tablet; TAKE 1 TABLET BY MOUTH ON ALL OTHER DAYS OR AS DIRECTED BY ANTICOAGULATION    Adjustment disorder with anxious mood  Stable for many years on paroxetine 30mg daily. No issues with side effects. Requesting refill today. She needs 30 day supplies given current insurance coverage and this was done. She will follow up with her primary care provider for additional refills.   Orders:    PARoxetine (PAXIL) 30 MG tablet; Take 1 tablet (30 mg) by mouth every morning.    Visit for screening mammogram    Orders:    MA Screening Bilateral w/ Atilio; Future      Subjective   Lisandra is a 39 year old, presenting for the following health issues:  Medication Refill (Paxil Had 90 day supply but only filled 30 days and now that 90 day RX has  .)      2025     5:18 PM   Additional Questions   Roomed by sac   Accompanied by self         2025     5:18 PM   Patient Reported Additional Medications   Patient reports taking the following new medications no     Video Start Time:  5:23    Paxil refills. No concerns or side effects  Requesting refills on warfarin. Two pills left. She has not had INR drawn for a long time. No signs of bleeding.    History of Present Illness       Reason for visit:  Paxil refills   She is taking medications regularly.          Objective         Vitals:  No vitals were obtained today due to virtual visit.    Physical Exam   GENERAL: alert and no distress  EYES: Eyes grossly normal to inspection.  No discharge or erythema, or obvious scleral/conjunctival abnormalities.  RESP: No audible wheeze, cough, or visible cyanosis.    SKIN: Visible skin clear. No significant rash, abnormal pigmentation or lesions.  NEURO: Cranial nerves grossly intact.  Mentation and speech appropriate for age.  PSYCH: Appropriate affect, tone, and pace of words      Video-Visit Details    Type of service:  Video Visit   Video End Time: 5:36  Originating Location (pt. Location): Home    Distant Location (provider location):  On-site  Platform used for Video Visit: Slime  Signed Electronically by: SOFIA Russell CNP

## 2025-02-27 NOTE — ASSESSMENT & PLAN NOTE
Patient is requesting bridge refill on her previously prescribed warfarin, which she takes for lupus anticoagulant syndrome. We reviewed her chart and the fact that she has not had an INR done in 8 months. She can't identify any specific barrier other than that her dose has been so stable. Discussed the need to verify dose is therapeutic in order to ethically continue prescribing, as well as the fact that she will need a new referral to the anticoagulation clinic from her primary care provider and she expressed understanding of this. If covered by insurance, she may be a good candidate for at home INR monitoring. Because she only has two pills left, I will plan on sending in two week supply and she reports she will schedule INR tomorrow as well as a follow up with her PCP as soon as possible.  Orders:    warfarin ANTICOAGULANT (COUMADIN) 5 MG tablet; TAKE 1 TABLET BY MOUTH ON ALL OTHER DAYS OR AS DIRECTED BY ANTICOAGULATION

## 2025-02-27 NOTE — TELEPHONE ENCOUNTER
Called patient and left a voicemail message to call the clinic and schedule an Annual physical/ med check appointment.      Vivian South

## 2025-03-10 ENCOUNTER — MYC MEDICAL ADVICE (OUTPATIENT)
Dept: ANTICOAGULATION | Facility: CLINIC | Age: 40
End: 2025-03-10
Payer: MEDICARE

## 2025-03-10 ENCOUNTER — TELEPHONE (OUTPATIENT)
Dept: FAMILY MEDICINE | Facility: CLINIC | Age: 40
End: 2025-03-10
Payer: MEDICARE

## 2025-03-10 DIAGNOSIS — D68.62 LUPUS ANTICOAGULANT SYNDROME: ICD-10-CM

## 2025-03-10 NOTE — TELEPHONE ENCOUNTER
Her Coumadin been managed by Coumadin clinic  Takes one tab daily.  As per Coumadin clinic note ( 03/7/2025  Hold coumadin dose for 2 days and start with 5 mg a day on Sunday, March, 09/2025    Thanks

## 2025-03-10 NOTE — TELEPHONE ENCOUNTER
Pharmacy confused on warfarin instructions TAKE 1 TABLET BY MOUTH ON ALL OTHER DAYS OR AS DIRECTED BY ANTICOAGULATION. Please rephrase for clarity.    Martha Barger RN on 3/10/2025 at 1:25 PM

## 2025-03-11 RX ORDER — WARFARIN SODIUM 5 MG/1
5 TABLET ORAL DAILY
Qty: 30 TABLET | Refills: 0 | Status: SHIPPED | OUTPATIENT
Start: 2025-03-11

## 2025-03-13 ENCOUNTER — MYC MEDICAL ADVICE (OUTPATIENT)
Dept: ANTICOAGULATION | Facility: CLINIC | Age: 40
End: 2025-03-13
Payer: MEDICARE

## 2025-03-13 DIAGNOSIS — D68.62 LUPUS ANTICOAGULANT SYNDROME: ICD-10-CM

## 2025-03-13 DIAGNOSIS — F43.22 ADJUSTMENT DISORDER WITH ANXIOUS MOOD: ICD-10-CM

## 2025-03-13 RX ORDER — PAROXETINE 30 MG/1
30 TABLET, FILM COATED ORAL EVERY MORNING
Qty: 30 TABLET | Refills: 11 | Status: SHIPPED | OUTPATIENT
Start: 2025-03-13

## 2025-03-13 RX ORDER — PAROXETINE 20 MG/1
20 TABLET, FILM COATED ORAL EVERY MORNING
Qty: 90 TABLET | Refills: 3 | Status: SHIPPED | OUTPATIENT
Start: 2025-03-13 | End: 2025-03-13 | Stop reason: DRUGHIGH

## 2025-03-17 ENCOUNTER — ANCILLARY PROCEDURE (OUTPATIENT)
Dept: MAMMOGRAPHY | Facility: CLINIC | Age: 40
End: 2025-03-17
Payer: MEDICARE

## 2025-03-17 ENCOUNTER — APPOINTMENT (OUTPATIENT)
Dept: LAB | Facility: CLINIC | Age: 40
End: 2025-03-17
Payer: MEDICARE

## 2025-03-17 ENCOUNTER — ANTICOAGULATION THERAPY VISIT (OUTPATIENT)
Dept: ANTICOAGULATION | Facility: CLINIC | Age: 40
End: 2025-03-17

## 2025-03-17 DIAGNOSIS — Z12.31 VISIT FOR SCREENING MAMMOGRAM: ICD-10-CM

## 2025-03-17 DIAGNOSIS — D68.62 LUPUS ANTICOAGULANT SYNDROME: Primary | ICD-10-CM

## 2025-03-17 PROCEDURE — 77063 BREAST TOMOSYNTHESIS BI: CPT | Mod: TC | Performed by: RADIOLOGY

## 2025-03-17 PROCEDURE — 77067 SCR MAMMO BI INCL CAD: CPT | Mod: TC | Performed by: RADIOLOGY

## 2025-03-17 NOTE — PROGRESS NOTES
ANTICOAGULATION MANAGEMENT     Lisandra Arauz 39 year old female is on warfarin with subtherapeutic INR result. (Goal INR 2.0-3.0)    Recent labs: (last 7 days)     03/17/25  1447   INR 1.5*       ASSESSMENT     Source(s): Chart Review and Patient/Caregiver Call     Warfarin doses taken: Less warfarin taken than planned which may be affecting INR - pt took 2.5 mg Fri-Sun - see [employer] message for more information.   Diet: No new diet changes identified  Medication/supplement changes: None noted  New illness, injury, or hospitalization: No  Signs or symptoms of bleeding or clotting: No  Previous result: Supratherapeutic  Additional findings:  Hx of non-compliance       PLAN     Recommended plan for temporary change(s) affecting INR     Dosing Instructions: booster dose then continue your current warfarin dose with next INR in 1 week       Summary  As of 3/17/2025      Full warfarin instructions:  3/17: 7.5 mg; Otherwise 5 mg every day   Next INR check:  3/24/2025               Sent [employer] message with dosing and follow up instructions    Contact [phone] to schedule and with any changes, questions or concerns.     Education provided: Contact [phone] with any changes, questions or concerns.     Plan made per [facility] anticoagulation protocol    Tavon Monsivais RN  3/17/2025  Anticoagulation Clinic  [facility] for routing messages: p ANTICOAG [facility]  [facility] patient phone line: [phone]        _______________________________________________________________________     Anticoagulation Episode Summary       Current INR goal:  2.0-3.0   TTR:  --   Target end date:  7/23/2026   Send INR reminders to:  ANTICOAG [facility]    Indications    Lupus anticoagulant syndrome [D68.62]             Comments:  --             Anticoagulation Care Providers       Provider Role Specialty Phone number    Li Ashraf MD Referring Family Medicine [phone]

## 2025-03-21 ENCOUNTER — APPOINTMENT (OUTPATIENT)
Dept: LAB | Facility: CLINIC | Age: 40
End: 2025-03-21
Payer: MEDICARE

## 2025-03-21 PROBLEM — L28.0 LICHENOID DERMATITIS: Status: ACTIVE | Noted: 2025-03-21

## 2025-03-21 PROBLEM — E66.01 CLASS 2 SEVERE OBESITY WITH BODY MASS INDEX (BMI) OF 35 TO 39.9 WITH SERIOUS COMORBIDITY (H): Status: ACTIVE | Noted: 2025-03-21

## 2025-03-21 PROBLEM — E66.812 CLASS 2 SEVERE OBESITY WITH BODY MASS INDEX (BMI) OF 35 TO 39.9 WITH SERIOUS COMORBIDITY (H): Status: ACTIVE | Noted: 2025-03-21

## 2025-03-31 ENCOUNTER — MYC MEDICAL ADVICE (OUTPATIENT)
Dept: ANTICOAGULATION | Facility: CLINIC | Age: 40
End: 2025-03-31
Payer: MEDICARE

## 2025-04-07 ENCOUNTER — MYC MEDICAL ADVICE (OUTPATIENT)
Dept: ANTICOAGULATION | Facility: CLINIC | Age: 40
End: 2025-04-07
Payer: MEDICARE

## 2025-04-14 ENCOUNTER — DOCUMENTATION ONLY (OUTPATIENT)
Dept: ANTICOAGULATION | Facility: CLINIC | Age: 40
End: 2025-04-14

## 2025-04-14 NOTE — LETTER
"     [facility] ANTICOAGULATION CLINIC  [address] [facility] [address]  Phone: [phone]  Fax: [phone]   April 14, 2025        Lisandra Arauz  [address]  [place] [address]            Dear Lisandra,    You are currently under the care of [facility] for your warfarin (Coumadin , Jantoven ) therapy.  We are contacting you because our records show you were due for an INR on 3/28/25.    There are potentially serious risks when taking warfarin without careful monitoring and we want to make sure you are safely managed.  Routine lab monitoring is required for warfarin refills.     Please schedule a lab appointment as soon as possible either by calling [phone] or scheduling directly in [employer]. To schedule in [employer] open the \"schedule an appointment\" section then select \"lab only\" as the reason you are coming in and \"INR\" as the lab test. If it is difficult for you to get to lab, please call us to discuss options.  If there has been a change in your care or other concerns, please let us know so we can help and/or update our records.         Sincerely,       [facility]    "

## 2025-04-14 NOTE — PROGRESS NOTES
ANTICOAGULATION     Lisandra Arauz is overdue for an INR check.     Reminder letter sent    Bertha Wilhelm RN  4/14/2025  Anticoagulation Clinic  [facility] for routing messages: p ANTICOAG [facility] patient phone line: [phone]

## 2025-04-28 ENCOUNTER — DOCUMENTATION ONLY (OUTPATIENT)
Dept: ANTICOAGULATION | Facility: CLINIC | Age: 40
End: 2025-04-28
Payer: MEDICARE

## 2025-04-28 NOTE — LETTER
"     [facility] ANTICOAGULATION CLINIC  [address] [facility] [address]  Phone: [phone]  Fax: [phone]   April 28, 2025        Lisandra Arauz  [address]  [facility] [address]            Dear Lisandra,    You are currently under the care of [facility] for your warfarin (Coumadin , Jantoven ) therapy.  We are contacting you because our records show you were due for an INR on 3/28/25.    There are potentially serious risks when taking warfarin without careful monitoring and we want to make sure you are safely managed.  Routine lab monitoring is required for warfarin refills.     Please schedule a lab appointment as soon as possible either by calling [phone] or scheduling directly in [email or web address]. To schedule in [email or web address] open the \"schedule an appointment\" section then select \"lab only\" as the reason you are coming in and \"INR\" as the lab test. If it is difficult for you to get to lab, please call us to discuss options.  If there has been a change in your care or other concerns, please let us know so we can help and/or update our records.         Sincerely,       [facility]    "

## 2025-04-28 NOTE — PROGRESS NOTES
Anticoagulation Clinic Notification    Lisandra, is past due for an INR. Their last result was 2.4 on 3/21/25 and was due to come back on 3/28/25.    she received phone calls and letters over the last several weeks in attempt to arrange follow up labs. Lisandra Arauz will be contacted again today.     Please contact patient directly to discuss compliance with monitoring or schedule visit to review ongoing anticoagulation therapy.    Thank you,     [facility] Clinic

## 2025-05-28 ENCOUNTER — TELEPHONE (OUTPATIENT)
Dept: ANTICOAGULATION | Facility: CLINIC | Age: 40
End: 2025-05-28
Payer: MEDICARE

## 2025-05-28 NOTE — TELEPHONE ENCOUNTER
ANTICOAGULATION MANAGEMENT PROGRAM    Dr. Ashraf,     Our records indicate that Lisandra Arauz remains past due to check an INR. Lisandra Arauz was contacted multiple times over at least the last 8 weeks to attempt to arrange a follow up appointment.    Lisandra Arauz last had an an INR checked on 3/21/25 and was due for follow up on 3/28/25     Last [facility] referral date: 03/07/2025    Called patient,Left message for patient to call and schedule lab appointment as soon as possible. If returning call, please schedule.      At this time Lisandra Arauz will be moved to noncompliant status within the program until their referral expires on 3/7/26. While in noncompliant status the patient would continue to be contacted periodically (~every 6 weeks) by the anticoagulation program to attempt to schedule patient. You will be notified of each contact attempt to make you aware of patient's ongoing noncompliance.         Thank you,     [facility] Anticoagulation Management Program

## 2025-07-10 ENCOUNTER — TELEPHONE (OUTPATIENT)
Dept: ANTICOAGULATION | Facility: CLINIC | Age: 40
End: 2025-07-10
Payer: MEDICARE

## 2025-07-10 NOTE — TELEPHONE ENCOUNTER
ANTICOAGULATION MANAGEMENT PROGRAM    Dr Ashraf,     Our records indicate that Lisandra Arauz remains past due to check an INR. Lisandra Arauz was contacted multiple times over at least the last 8 weeks to attempt to arrange a follow up appointment.    Lisandra Arauz last had an an INR checked on 3/21/25 and was due for follow up on 3/28/25     Last [facility] referral date: 03/07/2025    Care Everywhere updated: yes    Called patient,Was unable to reach patient and was unable to leave a voicemail. If patient calls, please schedule INR check as soon as possible.      At this time Lisandra Arauz will be moved to noncompliant status within the program until their referral expires on 3/7/26. While in noncompliant status the patient would continue to be contacted periodically (~every 6 weeks) by the anticoagulation program to attempt to schedule patient. You will be notified of each contact attempt to make you aware of patient's ongoing noncompliance.         Thank you,     [facility] Anticoagulation Management Program

## 2025-07-20 DIAGNOSIS — D68.62 LUPUS ANTICOAGULANT SYNDROME: ICD-10-CM

## 2025-07-21 RX ORDER — WARFARIN SODIUM 5 MG/1
TABLET ORAL
Qty: 30 TABLET | Refills: 11 | Status: SHIPPED | OUTPATIENT
Start: 2025-07-21

## 2025-07-21 NOTE — TELEPHONE ENCOUNTER
ANTICOAGULATION MANAGEMENT:  Medication Refill    Anticoagulation Summary  As of 3/21/2025      Warfarin maintenance plan:  5 mg (5 mg x 1) every day   Next INR check:  3/28/2025   Target end date:  7/23/2026    Indications    Lupus anticoagulant syndrome [D68.62]                 Anticoagulation Care Providers       Provider Role Specialty Phone number    Li Ashraf MD Referring Family Medicine [phone]            Refill Criteria    Visit with referring provider/group: Meets criteria: visit within referring provider group in the last 15 months on 3/21/25    [facility] referral last signed: 03/07/2025; within last year:  Yes    Lab monitoring is up to date (not exceeding 2 weeks overdue): No    Lisandra does NOT meet all criteria for refill: > 2 weeks overdue for lab monitoring . [facility] fill previously given on 3/21/2025; routing to referring provider for further refills  per [facility] protocol    Bertha Wilhelm RN  Anticoagulation Clinic

## 2025-08-21 ENCOUNTER — TELEPHONE (OUTPATIENT)
Dept: ANTICOAGULATION | Facility: CLINIC | Age: 40
End: 2025-08-21
Payer: MEDICARE